# Patient Record
Sex: MALE | Race: BLACK OR AFRICAN AMERICAN | NOT HISPANIC OR LATINO | Employment: OTHER | URBAN - METROPOLITAN AREA
[De-identification: names, ages, dates, MRNs, and addresses within clinical notes are randomized per-mention and may not be internally consistent; named-entity substitution may affect disease eponyms.]

---

## 2017-06-09 ENCOUNTER — DOCUMENTATION ONLY (OUTPATIENT)
Dept: INTERNAL MEDICINE | Facility: CLINIC | Age: 72
End: 2017-06-09

## 2017-08-02 ENCOUNTER — HOSPITAL ENCOUNTER (EMERGENCY)
Facility: HOSPITAL | Age: 72
Discharge: HOME | End: 2017-08-02
Attending: FAMILY MEDICINE
Payer: MEDICARE

## 2017-08-02 VITALS
RESPIRATION RATE: 18 BRPM | OXYGEN SATURATION: 98 % | DIASTOLIC BLOOD PRESSURE: 79 MMHG | SYSTOLIC BLOOD PRESSURE: 137 MMHG | TEMPERATURE: 98 F | HEART RATE: 94 BPM

## 2017-08-02 DIAGNOSIS — M54.32 BILATERAL SCIATICA: Primary | ICD-10-CM

## 2017-08-02 DIAGNOSIS — M54.31 BILATERAL SCIATICA: Primary | ICD-10-CM

## 2017-08-02 PROCEDURE — 96372 THER/PROPH/DIAG INJ SC/IM: CPT

## 2017-08-02 PROCEDURE — 99283 EMERGENCY DEPT VISIT LOW MDM: CPT | Mod: 25

## 2017-08-02 PROCEDURE — 63600175 PHARM REV CODE 636 W HCPCS: Performed by: FAMILY MEDICINE

## 2017-08-02 PROCEDURE — 99283 EMERGENCY DEPT VISIT LOW MDM: CPT | Mod: ,,, | Performed by: FAMILY MEDICINE

## 2017-08-02 RX ORDER — TRAMADOL HYDROCHLORIDE 50 MG/1
50 TABLET ORAL EVERY 6 HOURS PRN
COMMUNITY
End: 2019-04-30

## 2017-08-02 RX ORDER — TRIAMCINOLONE ACETONIDE 40 MG/ML
80 INJECTION, SUSPENSION INTRA-ARTICULAR; INTRAMUSCULAR
Status: COMPLETED | OUTPATIENT
Start: 2017-08-02 | End: 2017-08-02

## 2017-08-02 RX ADMIN — TRIAMCINOLONE ACETONIDE 80 MG: 40 INJECTION, SUSPENSION INTRA-ARTICULAR; INTRAMUSCULAR at 06:08

## 2017-08-02 NOTE — ED TRIAGE NOTES
Pt c/o weakness to bilateral legs x 2 days. Pt also c/o pain to bilateral legs, lower back, and right shoulder - rates pain 9/10 at this time. Denies numbness or tingling. Denies CP or SOB. Denies HA or dizziness.

## 2017-08-02 NOTE — ED NOTES
"Pt instructed on 15 min shot time prior to leaving, will be assessed for reaction. Pt stated, "ok"  "

## 2017-08-02 NOTE — ED PROVIDER NOTES
Encounter Date: 8/2/2017    SCRIBE #1 NOTE: I, Radhagordo Chang, am scribing for, and in the presence of, Dr. Hook.       History     Chief Complaint   Patient presents with    Weakness     c/o weakness to bilateral legs x 2 days with lower back and leg pains. Ambulatory with hussein     Time seen by provider: 6:24 PM    This is a 71 y.o. male with a PMHx of MS, HNT, DJD, CAD, and DM who presents with complaint of 3 days of bilateral leg weakness and pain. The patient believes this is due to his MS but he has not seen the doctors who manage his MS in >6 months. The patient denies fever, nausea, vomiting, or diarrhea. The patient has no change in bowel or bladder habits.       The history is provided by the patient.     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 No pertinent family history.  Social History   Substance Use Topics    Smoking status: Former Smoker     Packs/day: 2.00     Years: 22.00     Types: Cigarettes     Quit date: 9/6/1989    Smokeless tobacco: Never Used      Comment: Quit smoking (2 pk/day) om 1987    Alcohol use No     Review of Systems   Constitutional: Negative for chills and fever.   HENT: Negative for facial swelling and nosebleeds.    Eyes: Negative for visual disturbance.   Respiratory: Negative for cough and shortness of breath.    Cardiovascular: Negative for chest pain and palpitations.   Gastrointestinal: Negative for abdominal distention, abdominal pain, diarrhea, nausea and vomiting.   Genitourinary:  Negative for difficulty urinating, dysuria, frequency and hematuria.   Musculoskeletal: Positive for myalgias. Negative for neck pain and neck stiffness.   Skin: Negative for rash.   Neurological: Positive for weakness (bilateral legs). Negative for seizures, syncope and speech difficulty.       Physical Exam     Initial Vitals [08/02/17 1721]   BP Pulse Resp Temp SpO2   137/79 94 18 98.4 °F (36.9 °C) 98 %      MAP       98.33         Physical Exam    Nursing note and vitals reviewed.  Constitutional: He appears well-developed and well-nourished. He is not diaphoretic. No distress.   HENT:   Head: Normocephalic and atraumatic.   Eyes: Conjunctivae and EOM are normal. Pupils are equal, round, and reactive to light. No scleral icterus.   Neck: Normal range of motion. Neck supple.   Cardiovascular: Normal rate, regular rhythm, normal heart sounds and intact distal pulses.   No murmur heard.  Pulmonary/Chest: Breath sounds normal. No respiratory distress. He has no wheezes. He has no rhonchi. He has no rales.   Abdominal: Soft. Bowel sounds are normal. He exhibits no distension. There is no tenderness.   Musculoskeletal: Normal range of motion. He exhibits no edema or tenderness.   No acute tenderness to lumbar spine. No deformities. Straight leg raise reproduces pain bilaterally.     Patient has multiple varicose veins without evidence of swelling or infection.    Neurological: He is alert and oriented to person, place, and time. He has normal strength. No cranial nerve deficit.   Bilateral lower extremity strength is equal   Skin: Skin is warm and dry. No rash noted. No erythema.   Psychiatric: He has a normal mood and affect.         ED Course   Procedures  Labs Reviewed - No data to display          Medical Decision Making:   History:   Old Medical Records: I decided to obtain old medical records.  ED Management:  Patient with bilateral leg weakness and pain.  Positive straight leg raising bilaterally.  I suspect  this is a sciatic nerve issue rather than an MS flare.  Triamcinolone injection.  Continue current pain medicine regimen.  Follow-up with his usual care providers for additional symptoms            Scribe Attestation:   Scribe #1: I performed the above scribed service and the documentation accurately describes the services I performed. I attest to the accuracy of the note.    Attending Attestation:           Physician Attestation for Scribe:  Physician Attestation Statement for Scribe #1: I, Dr. Hook, reviewed documentation, as scribed by Radha Chang in my presence, and it is both accurate and complete.                 ED Course     Clinical Impression:   The encounter diagnosis was Bilateral sciatica.                           Jose Hook MD  08/02/17 8724

## 2018-01-29 PROCEDURE — 93010 ELECTROCARDIOGRAM REPORT: CPT | Mod: ,,, | Performed by: INTERNAL MEDICINE

## 2018-01-29 PROCEDURE — 99285 EMERGENCY DEPT VISIT HI MDM: CPT | Mod: ,,, | Performed by: EMERGENCY MEDICINE

## 2018-01-29 PROCEDURE — 99285 EMERGENCY DEPT VISIT HI MDM: CPT | Mod: 25

## 2018-01-30 ENCOUNTER — HOSPITAL ENCOUNTER (OUTPATIENT)
Facility: HOSPITAL | Age: 73
Discharge: HOME | End: 2018-01-30
Attending: EMERGENCY MEDICINE | Admitting: EMERGENCY MEDICINE
Payer: MEDICARE

## 2018-01-30 VITALS
BODY MASS INDEX: 32.51 KG/M2 | HEART RATE: 82 BPM | TEMPERATURE: 98 F | OXYGEN SATURATION: 97 % | RESPIRATION RATE: 17 BRPM | HEIGHT: 72 IN | DIASTOLIC BLOOD PRESSURE: 104 MMHG | SYSTOLIC BLOOD PRESSURE: 146 MMHG | WEIGHT: 240 LBS

## 2018-01-30 DIAGNOSIS — G89.29 CHRONIC CHEST PAIN: ICD-10-CM

## 2018-01-30 DIAGNOSIS — R07.9 ACUTE CHEST PAIN: ICD-10-CM

## 2018-01-30 DIAGNOSIS — R07.9 CHRONIC CHEST PAIN: ICD-10-CM

## 2018-01-30 DIAGNOSIS — I25.10 CAD (CORONARY ARTERY DISEASE): ICD-10-CM

## 2018-01-30 DIAGNOSIS — R00.0 TACHYCARDIA: ICD-10-CM

## 2018-01-30 DIAGNOSIS — R00.2 PALPITATIONS: ICD-10-CM

## 2018-01-30 DIAGNOSIS — Z91.89 AT RISK FOR CORONARY ARTERY DISEASE: ICD-10-CM

## 2018-01-30 DIAGNOSIS — E11.9 TYPE 2 DIABETES MELLITUS WITHOUT COMPLICATION: Chronic | ICD-10-CM

## 2018-01-30 DIAGNOSIS — I25.118 CORONARY ARTERY DISEASE INVOLVING NATIVE CORONARY ARTERY OF NATIVE HEART WITH OTHER FORM OF ANGINA PECTORIS: Primary | ICD-10-CM

## 2018-01-30 DIAGNOSIS — I48.91 ATRIAL FIBRILLATION WITH RVR: ICD-10-CM

## 2018-01-30 DIAGNOSIS — I20.89 ANGINA AT REST: ICD-10-CM

## 2018-01-30 DIAGNOSIS — G35 MS (MULTIPLE SCLEROSIS): ICD-10-CM

## 2018-01-30 DIAGNOSIS — I20.9 ANGINA PECTORIS: ICD-10-CM

## 2018-01-30 LAB
ALBUMIN SERPL BCP-MCNC: 3.9 G/DL
ALP SERPL-CCNC: 110 U/L
ALT SERPL W/O P-5'-P-CCNC: 20 U/L
AMPHET+METHAMPHET UR QL: NEGATIVE
ANION GAP SERPL CALC-SCNC: 9 MMOL/L
AST SERPL-CCNC: 16 U/L
BARBITURATES UR QL SCN>200 NG/ML: NEGATIVE
BASOPHILS # BLD AUTO: 0.05 K/UL
BASOPHILS NFR BLD: 0.7 %
BENZODIAZ UR QL SCN>200 NG/ML: NEGATIVE
BILIRUB SERPL-MCNC: 0.4 MG/DL
BNP SERPL-MCNC: <10 PG/ML
BUN SERPL-MCNC: 21 MG/DL
BZE UR QL SCN: NEGATIVE
CALCIUM SERPL-MCNC: 9.8 MG/DL
CANNABINOIDS UR QL SCN: NEGATIVE
CHLORIDE SERPL-SCNC: 112 MMOL/L
CO2 SERPL-SCNC: 23 MMOL/L
CREAT SERPL-MCNC: 1.5 MG/DL
CREAT UR-MCNC: 268 MG/DL
D DIMER PPP IA.FEU-MCNC: 0.32 MG/L FEU
DIASTOLIC DYSFUNCTION: NO
DIFFERENTIAL METHOD: NORMAL
EOSINOPHIL # BLD AUTO: 0.1 K/UL
EOSINOPHIL NFR BLD: 1.7 %
ERYTHROCYTE [DISTWIDTH] IN BLOOD BY AUTOMATED COUNT: 12.7 %
EST. GFR  (AFRICAN AMERICAN): 53.8 ML/MIN/1.73 M^2
EST. GFR  (NON AFRICAN AMERICAN): 46.5 ML/MIN/1.73 M^2
ESTIMATED AVG GLUCOSE: 186 MG/DL
ESTIMATED PA SYSTOLIC PRESSURE: 34.42
ETHANOL UR-MCNC: <10 MG/DL
GLUCOSE SERPL-MCNC: 143 MG/DL
HBA1C MFR BLD HPLC: 8.1 %
HCT VFR BLD AUTO: 46 %
HGB BLD-MCNC: 16.1 G/DL
IMM GRANULOCYTES # BLD AUTO: 0.03 K/UL
IMM GRANULOCYTES NFR BLD AUTO: 0.4 %
INR PPP: 1.1
LYMPHOCYTES # BLD AUTO: 2.2 K/UL
LYMPHOCYTES NFR BLD: 30.4 %
MCH RBC QN AUTO: 30.1 PG
MCHC RBC AUTO-ENTMCNC: 35 G/DL
MCV RBC AUTO: 86 FL
METHADONE UR QL SCN>300 NG/ML: NEGATIVE
MITRAL VALVE MOBILITY: NORMAL
MONOCYTES # BLD AUTO: 0.6 K/UL
MONOCYTES NFR BLD: 7.7 %
NEUTROPHILS # BLD AUTO: 4.3 K/UL
NEUTROPHILS NFR BLD: 59.1 %
NRBC BLD-RTO: 0 /100 WBC
OPIATES UR QL SCN: NEGATIVE
PCP UR QL SCN>25 NG/ML: NEGATIVE
PLATELET # BLD AUTO: 166 K/UL
PMV BLD AUTO: 10 FL
POCT GLUCOSE: 126 MG/DL (ref 70–110)
POCT GLUCOSE: 162 MG/DL (ref 70–110)
POCT GLUCOSE: 164 MG/DL (ref 70–110)
POTASSIUM SERPL-SCNC: 4.2 MMOL/L
PROT SERPL-MCNC: 7.3 G/DL
PROTHROMBIN TIME: 11.4 SEC
RBC # BLD AUTO: 5.35 M/UL
RETIRED EF AND QEF - SEE NOTES: 65 (ref 55–65)
SODIUM SERPL-SCNC: 144 MMOL/L
T4 FREE SERPL-MCNC: 0.98 NG/DL
TOXICOLOGY INFORMATION: NORMAL
TRICUSPID VALVE REGURGITATION: NORMAL
TROPONIN I SERPL DL<=0.01 NG/ML-MCNC: 0.04 NG/ML
TROPONIN I SERPL DL<=0.01 NG/ML-MCNC: 0.05 NG/ML
TSH SERPL DL<=0.005 MIU/L-ACNC: 2.1 UIU/ML
WBC # BLD AUTO: 7.23 K/UL

## 2018-01-30 PROCEDURE — 82962 GLUCOSE BLOOD TEST: CPT | Mod: 91

## 2018-01-30 PROCEDURE — 80053 COMPREHEN METABOLIC PANEL: CPT

## 2018-01-30 PROCEDURE — 63600175 PHARM REV CODE 636 W HCPCS: Performed by: HOSPITALIST

## 2018-01-30 PROCEDURE — 85610 PROTHROMBIN TIME: CPT

## 2018-01-30 PROCEDURE — 84484 ASSAY OF TROPONIN QUANT: CPT | Mod: 91

## 2018-01-30 PROCEDURE — 94761 N-INVAS EAR/PLS OXIMETRY MLT: CPT

## 2018-01-30 PROCEDURE — 84484 ASSAY OF TROPONIN QUANT: CPT

## 2018-01-30 PROCEDURE — 25000003 PHARM REV CODE 250: Performed by: HOSPITALIST

## 2018-01-30 PROCEDURE — 96372 THER/PROPH/DIAG INJ SC/IM: CPT

## 2018-01-30 PROCEDURE — 99220 PR INITIAL OBSERVATION CARE,LEVL III: CPT | Mod: ,,, | Performed by: HOSPITALIST

## 2018-01-30 PROCEDURE — 99217 PR OBSERVATION CARE DISCHARGE: CPT | Mod: ,,, | Performed by: HOSPITALIST

## 2018-01-30 PROCEDURE — 25000003 PHARM REV CODE 250: Performed by: EMERGENCY MEDICINE

## 2018-01-30 PROCEDURE — 85025 COMPLETE CBC W/AUTO DIFF WBC: CPT

## 2018-01-30 PROCEDURE — 85379 FIBRIN DEGRADATION QUANT: CPT

## 2018-01-30 PROCEDURE — 25000242 PHARM REV CODE 250 ALT 637 W/ HCPCS: Performed by: HOSPITALIST

## 2018-01-30 PROCEDURE — 84439 ASSAY OF FREE THYROXINE: CPT

## 2018-01-30 PROCEDURE — 83036 HEMOGLOBIN GLYCOSYLATED A1C: CPT

## 2018-01-30 PROCEDURE — 93005 ELECTROCARDIOGRAM TRACING: CPT

## 2018-01-30 PROCEDURE — 80307 DRUG TEST PRSMV CHEM ANLYZR: CPT

## 2018-01-30 PROCEDURE — 93306 TTE W/DOPPLER COMPLETE: CPT

## 2018-01-30 PROCEDURE — 96374 THER/PROPH/DIAG INJ IV PUSH: CPT | Mod: 59

## 2018-01-30 PROCEDURE — 83880 ASSAY OF NATRIURETIC PEPTIDE: CPT

## 2018-01-30 PROCEDURE — 84443 ASSAY THYROID STIM HORMONE: CPT

## 2018-01-30 PROCEDURE — 93306 TTE W/DOPPLER COMPLETE: CPT | Mod: 26,,, | Performed by: INTERNAL MEDICINE

## 2018-01-30 PROCEDURE — 93010 ELECTROCARDIOGRAM REPORT: CPT | Mod: ,,, | Performed by: INTERNAL MEDICINE

## 2018-01-30 PROCEDURE — 96361 HYDRATE IV INFUSION ADD-ON: CPT

## 2018-01-30 PROCEDURE — G0378 HOSPITAL OBSERVATION PER HR: HCPCS

## 2018-01-30 RX ORDER — GLUCAGON 1 MG
1 KIT INJECTION
Status: DISCONTINUED | OUTPATIENT
Start: 2018-01-30 | End: 2018-01-30 | Stop reason: HOSPADM

## 2018-01-30 RX ORDER — METOPROLOL TARTRATE 25 MG/1
25 TABLET, FILM COATED ORAL 2 TIMES DAILY
Status: DISCONTINUED | OUTPATIENT
Start: 2018-01-30 | End: 2018-01-30

## 2018-01-30 RX ORDER — INSULIN ASPART 100 [IU]/ML
1-10 INJECTION, SOLUTION INTRAVENOUS; SUBCUTANEOUS
Status: DISCONTINUED | OUTPATIENT
Start: 2018-01-30 | End: 2018-01-30 | Stop reason: HOSPADM

## 2018-01-30 RX ORDER — SODIUM CHLORIDE 0.9 % (FLUSH) 0.9 %
5 SYRINGE (ML) INJECTION
Status: DISCONTINUED | OUTPATIENT
Start: 2018-01-30 | End: 2018-01-30 | Stop reason: HOSPADM

## 2018-01-30 RX ORDER — IBUPROFEN 200 MG
16 TABLET ORAL
Status: DISCONTINUED | OUTPATIENT
Start: 2018-01-30 | End: 2018-01-30 | Stop reason: HOSPADM

## 2018-01-30 RX ORDER — SIMVASTATIN 20 MG/1
80 TABLET, FILM COATED ORAL NIGHTLY
Status: DISCONTINUED | OUTPATIENT
Start: 2018-01-30 | End: 2018-01-30 | Stop reason: HOSPADM

## 2018-01-30 RX ORDER — VALSARTAN 160 MG/1
160 TABLET ORAL DAILY
Status: DISCONTINUED | OUTPATIENT
Start: 2018-01-30 | End: 2018-01-30 | Stop reason: HOSPADM

## 2018-01-30 RX ORDER — ACETAMINOPHEN 500 MG
5000 TABLET ORAL DAILY
Status: DISCONTINUED | OUTPATIENT
Start: 2018-01-30 | End: 2018-01-30 | Stop reason: HOSPADM

## 2018-01-30 RX ORDER — AMOXICILLIN 250 MG
2 CAPSULE ORAL 2 TIMES DAILY PRN
Status: DISCONTINUED | OUTPATIENT
Start: 2018-01-30 | End: 2018-01-30 | Stop reason: HOSPADM

## 2018-01-30 RX ORDER — SERTRALINE HYDROCHLORIDE 50 MG/1
100 TABLET, FILM COATED ORAL DAILY
Status: DISCONTINUED | OUTPATIENT
Start: 2018-01-30 | End: 2018-01-30 | Stop reason: HOSPADM

## 2018-01-30 RX ORDER — CLOPIDOGREL BISULFATE 75 MG/1
75 TABLET ORAL DAILY
Status: DISCONTINUED | OUTPATIENT
Start: 2018-01-30 | End: 2018-01-30 | Stop reason: HOSPADM

## 2018-01-30 RX ORDER — BUTALBITAL, ACETAMINOPHEN AND CAFFEINE 50; 325; 40 MG/1; MG/1; MG/1
1 TABLET ORAL EVERY 6 HOURS PRN
Status: DISCONTINUED | OUTPATIENT
Start: 2018-01-30 | End: 2018-01-30 | Stop reason: HOSPADM

## 2018-01-30 RX ORDER — IPRATROPIUM BROMIDE AND ALBUTEROL SULFATE 2.5; .5 MG/3ML; MG/3ML
3 SOLUTION RESPIRATORY (INHALATION) EVERY 4 HOURS PRN
Status: DISCONTINUED | OUTPATIENT
Start: 2018-01-30 | End: 2018-01-30 | Stop reason: HOSPADM

## 2018-01-30 RX ORDER — METOPROLOL SUCCINATE 50 MG/1
50 TABLET, EXTENDED RELEASE ORAL DAILY
Status: DISCONTINUED | OUTPATIENT
Start: 2018-01-30 | End: 2018-01-30 | Stop reason: HOSPADM

## 2018-01-30 RX ORDER — LABETALOL HYDROCHLORIDE 5 MG/ML
10 INJECTION, SOLUTION INTRAVENOUS ONCE
Status: COMPLETED | OUTPATIENT
Start: 2018-01-30 | End: 2018-01-30

## 2018-01-30 RX ORDER — TRAMADOL HYDROCHLORIDE 50 MG/1
50 TABLET ORAL EVERY 6 HOURS PRN
Status: DISCONTINUED | OUTPATIENT
Start: 2018-01-30 | End: 2018-01-30 | Stop reason: HOSPADM

## 2018-01-30 RX ORDER — ASPIRIN 325 MG
325 TABLET, DELAYED RELEASE (ENTERIC COATED) ORAL
Status: DISCONTINUED | OUTPATIENT
Start: 2018-01-30 | End: 2018-01-30

## 2018-01-30 RX ORDER — ASPIRIN 81 MG/1
81 TABLET ORAL DAILY
Status: DISCONTINUED | OUTPATIENT
Start: 2018-01-31 | End: 2018-01-30 | Stop reason: HOSPADM

## 2018-01-30 RX ORDER — HEPARIN SODIUM 5000 [USP'U]/ML
5000 INJECTION, SOLUTION INTRAVENOUS; SUBCUTANEOUS EVERY 8 HOURS
Status: DISCONTINUED | OUTPATIENT
Start: 2018-01-30 | End: 2018-01-30 | Stop reason: HOSPADM

## 2018-01-30 RX ORDER — ASPIRIN 325 MG
325 TABLET ORAL
Status: COMPLETED | OUTPATIENT
Start: 2018-01-30 | End: 2018-01-30

## 2018-01-30 RX ORDER — TRAZODONE HYDROCHLORIDE 50 MG/1
50 TABLET ORAL NIGHTLY PRN
Status: DISCONTINUED | OUTPATIENT
Start: 2018-01-30 | End: 2018-01-30 | Stop reason: HOSPADM

## 2018-01-30 RX ORDER — ACETAMINOPHEN 325 MG/1
650 TABLET ORAL EVERY 4 HOURS PRN
Status: DISCONTINUED | OUTPATIENT
Start: 2018-01-30 | End: 2018-01-30 | Stop reason: HOSPADM

## 2018-01-30 RX ORDER — METOPROLOL SUCCINATE 25 MG/1
50 TABLET, EXTENDED RELEASE ORAL DAILY
Qty: 30 TABLET | Refills: 5 | Status: SHIPPED | OUTPATIENT
Start: 2018-01-30 | End: 2019-10-03 | Stop reason: SDUPTHER

## 2018-01-30 RX ORDER — INSULIN ASPART 100 [IU]/ML
5 INJECTION, SOLUTION INTRAVENOUS; SUBCUTANEOUS
Status: DISCONTINUED | OUTPATIENT
Start: 2018-01-30 | End: 2018-01-30 | Stop reason: HOSPADM

## 2018-01-30 RX ORDER — FLUTICASONE PROPIONATE 50 MCG
1 SPRAY, SUSPENSION (ML) NASAL DAILY
Status: DISCONTINUED | OUTPATIENT
Start: 2018-01-30 | End: 2018-01-30 | Stop reason: HOSPADM

## 2018-01-30 RX ORDER — ONDANSETRON 2 MG/ML
4 INJECTION INTRAMUSCULAR; INTRAVENOUS EVERY 8 HOURS PRN
Status: DISCONTINUED | OUTPATIENT
Start: 2018-01-30 | End: 2018-01-30 | Stop reason: HOSPADM

## 2018-01-30 RX ORDER — ALPRAZOLAM 0.5 MG/1
1 TABLET ORAL 3 TIMES DAILY PRN
Status: DISCONTINUED | OUTPATIENT
Start: 2018-01-30 | End: 2018-01-30 | Stop reason: HOSPADM

## 2018-01-30 RX ORDER — FLUTICASONE FUROATE AND VILANTEROL 100; 25 UG/1; UG/1
1 POWDER RESPIRATORY (INHALATION) DAILY
Status: DISCONTINUED | OUTPATIENT
Start: 2018-01-30 | End: 2018-01-30 | Stop reason: HOSPADM

## 2018-01-30 RX ORDER — IBUPROFEN 200 MG
24 TABLET ORAL
Status: DISCONTINUED | OUTPATIENT
Start: 2018-01-30 | End: 2018-01-30 | Stop reason: HOSPADM

## 2018-01-30 RX ORDER — PANTOPRAZOLE SODIUM 40 MG/1
40 TABLET, DELAYED RELEASE ORAL DAILY
Status: DISCONTINUED | OUTPATIENT
Start: 2018-01-30 | End: 2018-01-30 | Stop reason: HOSPADM

## 2018-01-30 RX ADMIN — VALSARTAN 160 MG: 160 TABLET ORAL at 09:01

## 2018-01-30 RX ADMIN — CLOPIDOGREL 75 MG: 75 TABLET, FILM COATED ORAL at 09:01

## 2018-01-30 RX ADMIN — FLUTICASONE FUROATE AND VILANTEROL TRIFENATATE 1 PUFF: 100; 25 POWDER RESPIRATORY (INHALATION) at 12:01

## 2018-01-30 RX ADMIN — INSULIN ASPART 5 UNITS: 100 INJECTION, SOLUTION INTRAVENOUS; SUBCUTANEOUS at 09:01

## 2018-01-30 RX ADMIN — SODIUM CHLORIDE 1000 ML: 0.9 INJECTION, SOLUTION INTRAVENOUS at 03:01

## 2018-01-30 RX ADMIN — HEPARIN SODIUM 5000 UNITS: 5000 INJECTION INTRAVENOUS; SUBCUTANEOUS at 06:01

## 2018-01-30 RX ADMIN — SERTRALINE HYDROCHLORIDE 100 MG: 50 TABLET ORAL at 09:01

## 2018-01-30 RX ADMIN — LABETALOL HYDROCHLORIDE 10 MG: 5 INJECTION, SOLUTION INTRAVENOUS at 01:01

## 2018-01-30 RX ADMIN — INSULIN ASPART 5 UNITS: 100 INJECTION, SOLUTION INTRAVENOUS; SUBCUTANEOUS at 12:01

## 2018-01-30 RX ADMIN — PANTOPRAZOLE SODIUM 40 MG: 40 TABLET, DELAYED RELEASE ORAL at 09:01

## 2018-01-30 RX ADMIN — FLUTICASONE PROPIONATE 50 MCG: 50 SPRAY, METERED NASAL at 12:01

## 2018-01-30 RX ADMIN — Medication 325 MG: at 04:01

## 2018-01-30 NOTE — ED PROVIDER NOTES
Encounter Date: 1/29/2018    SCRIBE #1 NOTE: I, Stefan Rivas, am scribing for, and in the presence of,  Dr. Hightower. I have scribed the following portions of the note - the EKG reading.       History     Chief Complaint   Patient presents with    Palpitations     palpitations all day, denies CP     HPI   71 y/o male with PMHx significant for CAD, past stents in 2015 who presents to the ED with tachycardia and chest pain. States that this began around noon, was not doing anything when this began. States that he had mild, intermittent, non radiating chest pain with this, lasted a few minutes at a time. Not having active chest pain at this time. States that this happened to him several months ago, was admitted but unsure what was done for him when admitted. Has not taken any meds for this. No unilateral leg swelling, no recent travel, no hemoptysis. No dark stools, no BRBPR. No recent decrease in PO intake.    Review of patient's allergies indicates:   Allergen Reactions    Pcn [penicillins] Itching     Past Medical History:   Diagnosis Date    CAD (coronary artery disease) 2/4/2014    PTCA/stent 2010    CHRONIC BRONCHITIS     Diabetes mellitus, type 2 2001    DJD (degenerative joint disease) of lumbar spine 2/4/2014    HTN (hypertension), benign 1987    MS (multiple sclerosis) 1990     Past Surgical History:   Procedure Laterality Date    CARDIAC SURGERY      CORONARY STENT PLACEMENT      LUNG SURGERY       No family history on file.  Social History   Substance Use Topics    Smoking status: Former Smoker     Packs/day: 2.00     Years: 22.00     Types: Cigarettes     Quit date: 9/6/1989    Smokeless tobacco: Never Used      Comment: Quit smoking (2 pk/day) om 1987    Alcohol use No     Review of Systems   Constitutional: Negative for chills and fever.   HENT: Negative for drooling and tinnitus.    Eyes: Negative for photophobia and visual disturbance.   Respiratory: Negative for shortness of breath and  wheezing.    Cardiovascular: Positive for chest pain and palpitations.   Gastrointestinal: Negative for abdominal pain, diarrhea, nausea and vomiting.   Genitourinary: Negative for flank pain and hematuria.   Musculoskeletal: Negative for neck pain and neck stiffness.   Skin: Negative for color change and pallor.   Neurological: Negative for light-headedness and numbness.   Psychiatric/Behavioral: Negative for agitation and confusion.       Physical Exam     Initial Vitals [01/29/18 2352]   BP Pulse Resp Temp SpO2   (!) 153/114 (!) 145 20 97.5 °F (36.4 °C) 98 %      MAP       127         Vitals:    01/30/18 1215 01/30/18 1219 01/30/18 1314 01/30/18 1345   BP: (!) 188/92  (!) 180/102 123/78   Pulse: 92 94 92 92   Resp: (!) 21 18 20 19   Temp:       TempSrc:       SpO2: 98% 98% 98% 96%   Weight:       Height:        01/30/18 1402   BP: (!) 146/104   Pulse: 82   Resp: 17   Temp:    TempSrc:    SpO2: 97%   Weight:    Height:        Physical Exam    Constitutional: He appears well-developed and well-nourished.   Sitting comfortably in bed with daughter at bedside, no diaphoresis   HENT:   Head: Normocephalic and atraumatic.   Mouth/Throat: No oropharyngeal exudate.   Eyes: EOM are normal. Pupils are equal, round, and reactive to light. No scleral icterus.   Neck: Normal range of motion. Neck supple. No tracheal deviation present. No JVD present.   Cardiovascular: Regular rhythm. Exam reveals no gallop and no friction rub.    No murmur heard.  Tachycardic, no peripheral edema, pulses 2+ bilaterally   Pulmonary/Chest: Breath sounds normal. No respiratory distress. He has no wheezes. He has no rhonchi. He has no rales.   Abdominal: Soft. He exhibits no distension. There is no tenderness. There is no rebound and no guarding.   Musculoskeletal: Normal range of motion.   Neurological: He is alert and oriented to person, place, and time. No cranial nerve deficit.   Skin: Skin is warm and dry.   Psychiatric: He has a normal mood  and affect. Thought content normal.         ED Course   Procedures  Labs Reviewed   COMPREHENSIVE METABOLIC PANEL - Abnormal; Notable for the following:        Result Value    Chloride 112 (*)     Glucose 143 (*)     Creatinine 1.5 (*)     eGFR if  53.8 (*)     eGFR if non  46.5 (*)     All other components within normal limits   TROPONIN I - Abnormal; Notable for the following:     Troponin I 0.046 (*)     All other components within normal limits   HEMOGLOBIN A1C - Abnormal; Notable for the following:     Hemoglobin A1C 8.1 (*)     Estimated Avg Glucose 186 (*)     All other components within normal limits   TROPONIN I - Abnormal; Notable for the following:     Troponin I 0.040 (*)     All other components within normal limits   POCT GLUCOSE - Abnormal; Notable for the following:     POCT Glucose 164 (*)     All other components within normal limits   POCT GLUCOSE - Abnormal; Notable for the following:     POCT Glucose 126 (*)     All other components within normal limits   POCT GLUCOSE - Abnormal; Notable for the following:     POCT Glucose 162 (*)     All other components within normal limits   CBC W/ AUTO DIFFERENTIAL   B-TYPE NATRIURETIC PEPTIDE   D DIMER, QUANTITATIVE   PROTIME-INR   PROTIME-INR   D DIMER, QUANTITATIVE   TOXICOLOGY SCREEN, URINE, RANDOM (COMPLIANCE)   T4, FREE   TSH   TSH    Narrative:     add on test free t4 and tsh per dr trang marcus order #[id number]  [id number]   05:24  01/30/2018    T4, FREE    Narrative:     add on test free t4 and tsh per dr trang marcus order #[id number]  [id number]   05:24  01/30/2018    TROPONIN I     X-Ray Chest PA And Lateral   Final Result         No acute radiographic findings in the chest.         Electronically signed by: PEGGY MCCULLOUGH MD   Date:     01/30/18   Time:    03:31       NM Myocardial Perfusion Spect Multi Exer    (Results Pending)   NM Myocardial Perfusion Spect Multi Pharmacologic    (Results Pending)        Assessment: 73 y/o male with palpitations, tachycardia and chest pain    Ddx includes but is not limited to: arrhythmia, dehydration, anemia, ACS/MI, VTE, hyper/hypokalemia, infection/sepsis, dehydration.    Plan: Patient currently HD stable, afebrile. D-dimer wnl, BNP wnl. Patient noted to have a trop that was elevated, likely 2/2 demand ischemia from patient's tachycardia. Patient's CBC unremarkable, CMP does demonstrate a Cr of 1.5, which is slightly increased from patient's baseline. Getting IVF. Will continue to monitor.    Christopher Luciano, HO-1  1/30/2018 3:35 AM     1 Update:  Patient admitted for observation status to trend trops, likely to obtain echo. Patient stable at this time.    Christopher Luciano, RACHEL-1 1/30/2018 4:19 AM        EKG Readings: (Independently Interpreted)   EKG: sinus tachycardia at 112 bpm, no CRISTO's or STD's, non-specific twave pattern, no STEMI            Medical Decision Making:   History:   Old Medical Records: I decided to obtain old medical records.  Independently Interpreted Test(s):   I have ordered and independently interpreted EKG Reading(s) - see prior notes  Clinical Tests:   Lab Tests: Ordered and Reviewed  Radiological Study: Ordered and Reviewed  Medical Tests: Ordered and Reviewed            Scribe Attestation:   Scribe #1: I performed the above scribed service and the documentation accurately describes the services I performed. I attest to the accuracy of the note.    Attending Attestation:   Physician Attestation Statement for Resident:  As the supervising MD   Physician Attestation Statement: I have personally seen and examined this patient.   I agree with the above history. -:   As the supervising MD I agree with the above PE.    As the supervising MD I agree with the above treatment, course, plan, and disposition.  I have reviewed and agree with the residents interpretation of the following: lab data, x-rays and EKG.  I have reviewed the following: old records at this  facility.            I, Dr. Lai Hightower, personally performed the services described in this documentation. All medical record entries made by the scribe were at my direction and in my presence.  I have reviewed the chart and agree that the record reflects my personal performance and is accurate and complete. Lai Hightower MD.  4:57 AM 01/31/2018            ED Course      Clinical Impression:   The primary encounter diagnosis was Coronary artery disease involving native coronary artery of native heart with other form of angina pectoris. Diagnoses of Palpitations, Tachycardia, Angina at rest, Acute chest pain, Chronic chest pain, Atrial fibrillation with RVR, At risk for coronary artery disease, and Angina pectoris were also pertinent to this visit.                           Christopher Luciano MD  Resident  01/30/18 0422       Lai Hightower MD  01/31/18 0457

## 2018-01-30 NOTE — H&P
[facility] Hospital Medicine  History & Physical    Patient Name: Vicente Luciano  MRN: [id number]  Admission Date: 1/30/2018  Attending Physician: Lai Hightower MD   Primary Care Provider: MINGO Garvin MD    [facility] Medicine Team: [facility] HOSP MED D Kinga Valentine DO     Patient information was obtained from patient and ER records.     Subjective:     Principal Problem:<principal problem not specified>    Chief Complaint:   Chief Complaint   Patient presents with    Palpitations     palpitations all day, denies CP        HPI:     72 year old male with h/o CAD s/p stents in 2015 at [facility], uncontrolled NIDDM2 ( A1C ~ 12), HTN, former smoker ( quit 1995 ), COPD, MS in remission presents to ED with complaint of chest pain. States yesterday around noon he developed palpitation at home followed by left sided chest pain at rest. Describes pain at tightness/squeezing in nature, intermittent and localized to left chest. Pain not associated with exertion. Denies diaphoresis, sob, nausea, vomiting, lightheadedness, dizziness. Notes chest pain continued throughout the day and thus his daughter finally drove him to ED around 7 pm. States pain resolved after receiving an aspirin in ED and remained chest pain free.     Initial EKG in ED showed sinus tachycardia ~140 which improved to HR 80-90 after 1 liter NS bolus. Denies fever, chills, N/V/D, sick contact, recent travel. Denies tobacco, alcohol, cocaine or other illicit drug abuse.     Initial troponin mildly elevated to 0.046    Past Medical History:   Diagnosis Date    CAD (coronary artery disease) 2/4/2014    PTCA/stent 2010    CHRONIC BRONCHITIS     Diabetes mellitus, type 2 2001    DJD (degenerative joint disease) of lumbar spine 2/4/2014    HTN (hypertension), benign 1987    MS (multiple sclerosis) 1990       Past Surgical History:   Procedure Laterality Date    CARDIAC SURGERY      CORONARY STENT PLACEMENT      LUNG SURGERY         Review  of patient's allergies indicates:   Allergen Reactions    Pcn [penicillins] Itching       No current facility-administered medications on file prior to encounter.      Current Outpatient Prescriptions on File Prior to Encounter   Medication Sig    albuterol (ACCUNEB) 0.63 mg/3 mL Nebu Take 0.63 mg by nebulization every 6 (six) hours as needed.    alprazolam (XANAX) 1 MG tablet Take 1 mg by mouth 3 (three) times daily as needed.    budesonide-formoterol 160-4.5 mcg (SYMBICORT) 160-4.5 mcg/actuation HFAA Inhale 2 puffs into the lungs every 12 (twelve) hours.    butalbital-acetaminophen-caffeine -40 mg (FIORICET, ESGIC) -40 mg per tablet Take 1 tablet by mouth every 6 (six) hours as needed for Pain or Headaches.    cholecalciferol, vitamin D3, 5,000 unit Tab Take 5,000 Units by mouth once daily.    clopidogrel (PLAVIX) 75 mg tablet Take 75 mg by mouth once daily.    flunisolide 25 mcg, 0.025%, (NASALIDE) 25 mcg (0.025 %) Spry 2 sprays by Nasal route 2 (two) times daily.    glipiZIDE (GLUCOTROL) 5 MG tablet Take 5 mg by mouth 2 (two) times daily before meals.    interferon beta-1a (AVONEX) 30 mcg/0.5 mL injection Inject 0.5 mLs (30 mcg total) into the muscle every 7 days.    linagliptin (TRADJENTA) 5 mg Tab tablet Take 1 tablet (5 mg total) by mouth once daily.    loratadine (CLARITIN) 10 mg tablet Take 10 mg by mouth daily as needed.    metformin (GLUCOPHAGE) 1000 MG tablet Take 1,000 mg by mouth 2 (two) times daily with meals.    omeprazole (PRILOSEC) 20 MG capsule Take 20 mg by mouth daily as needed.    sertraline (ZOLOFT) 100 MG tablet Take 100 mg by mouth once daily.    simvastatin (ZOCOR) 80 MG tablet Take 80 mg by mouth every evening.    tramadol (ULTRAM) 50 mg tablet Take 50 mg by mouth every 6 (six) hours as needed for Pain.    trazodone (DESYREL) 50 MG tablet Take 1 tablet (50 mg total) by mouth nightly as needed for Insomnia.    valsartan (DIOVAN) 160 MG tablet Take 160 mg by  mouth once daily.     Family History     None        Social History Main Topics    Smoking status: Former Smoker     Packs/day: 2.00     Years: 22.00     Types: Cigarettes     Quit date: 9/6/1989    Smokeless tobacco: Never Used      Comment: Quit smoking (2 pk/day) om 1987    Alcohol use No    Drug use: No    Sexual activity: Not on file     Review of Systems   Constitutional: Negative for activity change, appetite change, chills, diaphoresis, fatigue, fever and unexpected weight change.   HENT: Negative for congestion, dental problem, drooling, ear discharge, ear pain, facial swelling, hearing loss, mouth sores, nosebleeds, postnasal drip, rhinorrhea, sinus pressure, sneezing, sore throat, tinnitus, trouble swallowing and voice change.    Eyes: Negative for photophobia, pain, discharge, redness, itching and visual disturbance.   Respiratory: Positive for chest tightness. Negative for apnea, cough, choking, shortness of breath, wheezing and stridor.    Cardiovascular: Positive for chest pain and palpitations. Negative for leg swelling.   Gastrointestinal: Negative for abdominal distention, abdominal pain, anal bleeding, blood in stool, constipation, diarrhea, nausea, rectal pain and vomiting.   Endocrine: Negative for cold intolerance, heat intolerance, polydipsia, polyphagia and polyuria.   Genitourinary: Negative for decreased urine volume, difficulty urinating, discharge, dysuria, enuresis, flank pain, frequency, genital sores, hematuria, penile pain, penile swelling, scrotal swelling, testicular pain and urgency.   Musculoskeletal: Negative for arthralgias, back pain, gait problem, joint swelling, myalgias, neck pain and neck stiffness.   Skin: Negative for color change, pallor, rash and wound.   Allergic/Immunologic: Negative for environmental allergies, food allergies and immunocompromised state.   Neurological: Negative for dizziness, tremors, seizures, syncope, facial asymmetry, speech difficulty,  weakness, light-headedness, numbness and headaches.   Hematological: Negative for adenopathy. Does not bruise/bleed easily.   Psychiatric/Behavioral: Negative for agitation, behavioral problems, confusion, decreased concentration, dysphoric mood, hallucinations, self-injury, sleep disturbance and suicidal ideas. The patient is not nervous/anxious and is not hyperactive.      Objective:     Vital Signs (Most Recent):  Temp: 97.5 °F (36.4 °C) (01/29/18 2352)  Pulse: (!) 112 (01/30/18 0220)  Resp: 20 (01/30/18 0220)  BP: 131/80 (01/30/18 0231)  SpO2: 99 % (01/30/18 0220) Vital Signs (24h Range):  Temp:  [97.5 °F (36.4 °C)] 97.5 °F (36.4 °C)  Pulse:  [112-145] 112  Resp:  [20] 20  SpO2:  [98 %-99 %] 99 %  BP: (131-153)/() 131/80     Weight: 108.9 kg (240 lb)  Body mass index is 32.55 kg/m².    Physical Exam   Constitutional: He is oriented to person, place, and time. He appears well-developed and well-nourished. No distress.   HENT:   Head: Normocephalic and atraumatic.   Nose: Nose normal.   Mouth/Throat: Oropharynx is clear and moist. No oropharyngeal exudate.   Eyes: Conjunctivae and EOM are normal. Pupils are equal, round, and reactive to light. No scleral icterus.   Neck: Normal range of motion. Neck supple. No JVD present. No tracheal deviation present. No thyromegaly present.   Cardiovascular: Normal rate, regular rhythm, normal heart sounds and intact distal pulses.    No murmur heard.  Pulmonary/Chest: Effort normal and breath sounds normal. No respiratory distress. He has no wheezes. He has no rales. He exhibits no tenderness.   Abdominal: Soft. Bowel sounds are normal. He exhibits no distension and no mass. There is no tenderness. There is no rebound and no guarding.   Musculoskeletal: Normal range of motion. He exhibits no edema or tenderness.   Lymphadenopathy:     He has no cervical adenopathy.   Neurological: He is alert and oriented to person, place, and time. He has normal reflexes. He displays  normal reflexes. No cranial nerve deficit. He exhibits normal muscle tone. Coordination normal.   Skin: Skin is warm and dry. No rash noted. He is not diaphoretic. No erythema.   Psychiatric: He has a normal mood and affect. Judgment and thought content normal.         CRANIAL NERVES     CN III, IV, VI   Pupils are equal, round, and reactive to light.  Extraocular motions are normal.       Significant Labs:   Recent Results (from the past 24 hour(s))   CBC auto differential    Collection Time: 01/30/18  2:13 AM   Result Value Ref Range    WBC 7.23 3.90 - 12.70 K/uL    RBC 5.35 4.60 - 6.20 M/uL    Hemoglobin 16.1 14.0 - 18.0 g/dL    Hematocrit 46.0 40.0 - 54.0 %    MCV 86 82 - 98 fL    MCH 30.1 27.0 - 31.0 pg    MCHC 35.0 32.0 - 36.0 g/dL    RDW 12.7 11.5 - 14.5 %    Platelets 166 150 - 350 K/uL    MPV 10.0 9.2 - 12.9 fL    Immature Granulocytes 0.4 0.0 - 0.5 %    Gran # (ANC) 4.3 1.8 - 7.7 K/uL    Immature Grans (Abs) 0.03 0.00 - 0.04 K/uL    Lymph # 2.2 1.0 - 4.8 K/uL    Mono # 0.6 0.3 - 1.0 K/uL    Eos # 0.1 0.0 - 0.5 K/uL    Baso # 0.05 0.00 - 0.20 K/uL    nRBC 0 0 /100 WBC    Gran% 59.1 38.0 - 73.0 %    Lymph% 30.4 18.0 - 48.0 %    Mono% 7.7 4.0 - 15.0 %    Eosinophil% 1.7 0.0 - 8.0 %    Basophil% 0.7 0.0 - 1.9 %    Differential Method Automated    Protime-INR    Collection Time: 01/30/18  2:13 AM   Result Value Ref Range    Prothrombin Time 11.4 9.0 - 12.5 sec    INR 1.1 0.8 - 1.2   D dimer, quantitative    Collection Time: 01/30/18  2:13 AM   Result Value Ref Range    D-Dimer 0.32 <0.50 mg/L FEU   Comprehensive metabolic panel    Collection Time: 01/30/18  2:14 AM   Result Value Ref Range    Sodium 144 136 - 145 mmol/L    Potassium 4.2 3.5 - 5.1 mmol/L    Chloride 112 (H) 95 - 110 mmol/L    CO2 23 23 - 29 mmol/L    Glucose 143 (H) 70 - 110 mg/dL    BUN, Bld 21 8 - 23 mg/dL    Creatinine 1.5 (H) 0.5 - 1.4 mg/dL    Calcium 9.8 8.7 - 10.5 mg/dL    Total Protein 7.3 6.0 - 8.4 g/dL    Albumin 3.9 3.5 - 5.2 g/dL     Total Bilirubin 0.4 0.1 - 1.0 mg/dL    Alkaline Phosphatase 110 55 - 135 U/L    AST 16 10 - 40 U/L    ALT 20 10 - 44 U/L    Anion Gap 9 8 - 16 mmol/L    eGFR if African American 53.8 (A) >60 mL/min/1.73 m^2    eGFR if non  46.5 (A) >60 mL/min/1.73 m^2   Troponin I #1    Collection Time: 01/30/18  2:14 AM   Result Value Ref Range    Troponin I 0.046 (H) 0.000 - 0.026 ng/mL   B-Type natriuretic peptide (BNP)    Collection Time: 01/30/18  2:14 AM   Result Value Ref Range    BNP <10 0 - 99 pg/mL         Significant Imaging: I have reviewed and interpreted all pertinent imaging results/findings within the past 24 hours.    EKG : sinus tachycardia @ 140 bpm w/o acute ischemic change     CXR : no acute process     Assessment/Plan:     Active Diagnoses:    Diagnosis Date Noted POA    Palpitations [R00.2] 01/30/2018 Yes    Angina pectoris [I20.9] 01/30/2018 Yes    CAD (coronary artery disease) [I25.10] 02/04/2014 Yes    HTN (hypertension), benign [I10] 09/06/2012 Yes    COPD (chronic obstructive pulmonary disease) with chronic bronchitis [J44.9]  Yes      Problems Resolved During this Admission:    Diagnosis Date Noted Date Resolved POA     # Angina / palpitation   - reports palpitation and on/off left sided chest pain since noon   - sinus tachycardia upon presentation which resolved with IVF   - less likely to be PE given negative D-dimer and no hypoxia   - remained chest pain free in ED and currently asymptomatic   - EKG w/o acute ischemic pattern   - mild elevated troponin 0.046 likely 2/2 tachycardia induced demand ischemia   - palpitation, tachycardia could be from dehydration since it resolved with IVF   - check TSH, urine toxicology screen for tachycardia   - telemetry, trend troponin   - given h/o CAD and multiple risk factors will order TTE w doppler and cardiac PET stress   - unsure why patient not on beta blocker given h/o CAD s/p PCI   - will not start BB until UDS negative for cocaine  -  continue home meds aspirin, plavix, statin    - consider cardiology consult if troponin trends upward or stress test abnormal     # Uncontrolled NIDDM2  - last A1C 12 few years ago   - hold oral agent while in hospital   - will start on levemir 20, novolog 5 TIDWM and mod SSI   - check A1C    # HTN, HLD  - BP controlled on valsartan   - continue statin     # COPD   -former smoker quit 20 years ago   - no signs of exacerbation   - duonebs prn     # h/o Multiple sclerosis ( MS)  - in remission and asymptomatic     # depression/anxiety   - no acute issue   - denies SI/HI   - continue home dose zoloft and xanax prn       VTE Risk Mitigation         Ordered     heparin (porcine) injection 5,000 Units  Every 8 hours     Route:  Subcutaneous        01/30/18 0451     Medium Risk of VTE  Once      01/30/18 0451            Kinga Valentine DO  Department of Hospital Medicine   [facility]

## 2018-01-30 NOTE — ED NOTES
Adult Physical Assessment  LOC: Vicente Luciano, 72 y.o. male verified via two identifiers.  The patient is awake, alert, oriented and speaking appropriately at this time.  APPEARANCE: Patient resting comfortably and appears to be in no acute distress at this time. Patient is clean and well groomed, patient's clothing is properly fastened.  SKIN:The skin is warm and dry, color consistent with ethnicity, patient has normal skin turgor and moist mucus membranes, skin intact, no breakdown or brusing noted.  MUSCULOSKELETAL: Patient moving all extremities well, no obvious swelling or deformities noted.  RESPIRATORY: Airway is open and patent, respirations are spontaneous, patient has a normal effort and rate, no accessory muscle use noted.  CARDIAC: Patient has a normal rate and rhythm, no periphreal edema noted in any extremity, capillary refill < 3 seconds in all extremities. Pt denies cp, c/o chest tightness  ABDOMEN: Soft and non tender to palpation, no abdominal distention noted. Bowel sounds present in all four quadrants.  NEUROLOGIC: Eyes open spontaneously, behavior appropriate to situation, follows commands, facial expression symmetrical, bilateral hand grasp equal and even, purposeful motor response noted, normal sensation in all extremities when touched with a finger.

## 2018-02-01 NOTE — DISCHARGE SUMMARY
Discharge Summary  Hospital Medicine    Attending Provider on Discharge: Rd Gamez MD  Hospital Medicine Team: [facility] HOSP MED Z  Date of Admission:  1/30/2018     Date of Discharge:  1/30/2018    Active Hospital Problems    Diagnosis  POA    *Atrial fibrillation with RVR [I48.91]  Yes     Priority: 1 - High    CAD (coronary artery disease) [I25.10]  Yes     PTCA/stent 2010      HTN (hypertension), benign [I10]  Yes     1987      COPD (chronic obstructive pulmonary disease) with chronic bronchitis [J44.9]  Yes      Resolved Hospital Problems    Diagnosis Date Resolved POA    Palpitations [R00.2] 01/30/2018 Yes    Angina pectoris [I20.9] 01/30/2018 Yes       History of the Present Illness:      72 year old male with h/o CAD s/p stents in 2015 at [facility], uncontrolled NIDDM2 ( A1C ~ 12), HTN, former smoker ( quit 1995 ), COPD, MS in remission presents to ED with complaint of chest pain. States yesterday around noon he developed palpitation at home followed by left sided chest pain at rest. Describes pain at tightness/squeezing in nature, intermittent and localized to left chest. Pain not associated with exertion. Denies diaphoresis, sob, nausea, vomiting, lightheadedness, dizziness. Notes chest pain continued throughout the day and thus his daughter finally drove him to ED around 7 pm. States pain resolved after receiving an aspirin in ED and remained chest pain free.      Initial EKG in ED showed sinus tachycardia ~140 which improved to HR 80-90 after 1 liter NS bolus. Denies fever, chills, N/V/D, sick contact, recent travel. Denies tobacco, alcohol, cocaine or other illicit drug abuse.      Initial troponin mildly elevated to 0.046    Hospital Course of Principle Problem Addressed:       # Angina / palpitation   - resolved prior to admission    - EKG w/o acute ischemic pattern   - mild elevated troponin 0.046 likely 2/2 tachycardia induced demand ischemia   - less likely to be PE given negative D-dimer and no  hypoxia   - Troponin 0.046 > 0.040  - No further testing indicated while inpatient  - Follow up with cardiologist in 1-2 weeks    Other Medical Problems Addressed in the Hospital:     # Uncontrolled NIDDM2  - last A1C 12 few years ago   - hold oral agent while in hospital   - will start on levemir 20, novolog 5 TIDWM and mod SSI   - check A1C > 8.1     # HTN, HLD  - BP controlled on valsartan   - continue statin      # COPD   -former smoker quit 20 years ago   - no signs of exacerbation   - duonebs prn      # h/o Multiple sclerosis ( MS)  - in remission and asymptomatic      # depression/anxiety   - no acute issue   - denies SI/HI   - continue home dose zoloft and xanax prn        Significant diagnostic tests       Recent Labs  Lab 01/30/18  0213   WBC 7.23   HGB 16.1   HCT 46.0          Recent Labs  Lab 01/30/18 0214      K 4.2   *   CO2 23   BUN 21   CREATININE 1.5*   CALCIUM 9.8   PROT 7.3   BILITOT 0.4   ALKPHOS 110   ALT 20   AST 16        Discharge Medication List as of 1/30/2018  2:04 PM      START taking these medications    Details   metoprolol succinate (TOPROL-XL) 25 MG 24 hr tablet Take 2 tablets (50 mg total) by mouth once daily., Starting Tue 1/30/2018, Until Thu 3/1/2018, Normal         CONTINUE these medications which have NOT CHANGED    Details   albuterol (ACCUNEB) 0.63 mg/3 mL Nebu Take 0.63 mg by nebulization every 6 (six) hours as needed., Until Discontinued, Historical Med      alprazolam (XANAX) 1 MG tablet Take 1 mg by mouth 3 (three) times daily as needed., Until Discontinued, Historical Med      budesonide-formoterol 160-4.5 mcg (SYMBICORT) 160-4.5 mcg/actuation HFAA Inhale 2 puffs into the lungs every 12 (twelve) hours., Until Discontinued, Historical Med      butalbital-acetaminophen-caffeine -40 mg (FIORICET, ESGIC) -40 mg per tablet Take 1 tablet by mouth every 6 (six) hours as needed for Pain or Headaches., Starting 3/26/2016, Until Discontinued, Print       cholecalciferol, vitamin D3, 5,000 unit Tab Take 5,000 Units by mouth once daily., Until Discontinued, Historical Med      clopidogrel (PLAVIX) 75 mg tablet Take 75 mg by mouth once daily., Until Discontinued, Historical Med      flunisolide 25 mcg, 0.025%, (NASALIDE) 25 mcg (0.025 %) Spry 2 sprays by Nasal route 2 (two) times daily., Until Discontinued, Historical Med      glipiZIDE (GLUCOTROL) 5 MG tablet Take 5 mg by mouth 2 (two) times daily before meals., Until Discontinued, Historical Med      interferon beta-1a (AVONEX) 30 mcg/0.5 mL injection Inject 0.5 mLs (30 mcg total) into the muscle every 7 days., Starting 5/31/2016, Until Wed 5/31/17, No Print      linagliptin (TRADJENTA) 5 mg Tab tablet Take 1 tablet (5 mg total) by mouth once daily., Starting 2/21/2014, Until Discontinued, Normal      loratadine (CLARITIN) 10 mg tablet Take 10 mg by mouth daily as needed., Until Discontinued, Historical Med      metformin (GLUCOPHAGE) 1000 MG tablet Take 1,000 mg by mouth 2 (two) times daily with meals., Until Discontinued, Historical Med      omeprazole (PRILOSEC) 20 MG capsule Take 20 mg by mouth daily as needed., Until Discontinued, Historical Med      sertraline (ZOLOFT) 100 MG tablet Take 100 mg by mouth once daily., Until Discontinued, Historical Med      simvastatin (ZOCOR) 80 MG tablet Take 80 mg by mouth every evening., Until Discontinued, Historical Med      tramadol (ULTRAM) 50 mg tablet Take 50 mg by mouth every 6 (six) hours as needed for Pain., Historical Med      trazodone (DESYREL) 50 MG tablet Take 1 tablet (50 mg total) by mouth nightly as needed for Insomnia., Starting Tue 6/14/2016, Until Wed 8/2/2017, Print      valsartan (DIOVAN) 160 MG tablet Take 160 mg by mouth once daily., Until Discontinued, Historical Med           Discharge Diet:diabetic diet: 1800 calorie with Normal Fluid intake of 1500 - 2000 mL per day    Activity: activity as tolerated    Discharge Condition:  Good    Disposition: Home or Self Care    Tests pending at the time of discharge: none      Time spent  on the discharge of the patient including review of hospital course with the patient. reviewing discharge medications and arranging follow-up care 40 min    Discharge examination Patient was seen and examined on the date of discharge and determined to be suitable for discharge.    Future Appointments  Date Time Provider Department Center   2/5/2018 8:30 AM [facility] NM2 INFINIA 400LB LIMIT [facility]   2/5/2018 9:30 AM TREADMILL, NUCLEAR [facility] ECHOLAB [facility]   2/5/2018 11:30 AM [facility] NM2 INFINIA 400LB LIMIT [facility]   2/14/2018 9:00 AM Oscar Conrad MD [facility] CARDIO [facility]       Rd Gamez MD

## 2018-02-15 ENCOUNTER — DOCUMENTATION ONLY (OUTPATIENT)
Dept: INTERNAL MEDICINE | Facility: CLINIC | Age: 73
End: 2018-02-15

## 2018-02-15 NOTE — PROGRESS NOTES
Have tried numerous times to contact patient and set up a hospital follow up appointment. No answer and no voicemail in order to leave a message.

## 2018-04-18 ENCOUNTER — APPOINTMENT (RX ONLY)
Dept: URBAN - NONMETROPOLITAN AREA CLINIC 4 | Facility: CLINIC | Age: 73
Setting detail: DERMATOLOGY
End: 2018-04-18

## 2018-04-18 VITALS — WEIGHT: 160 LBS | HEIGHT: 66 IN

## 2018-04-18 DIAGNOSIS — L57.0 ACTINIC KERATOSIS: ICD-10-CM

## 2018-04-18 DIAGNOSIS — L57.8 OTHER SKIN CHANGES DUE TO CHRONIC EXPOSURE TO NONIONIZING RADIATION: ICD-10-CM

## 2018-04-18 DIAGNOSIS — D22 MELANOCYTIC NEVI: ICD-10-CM

## 2018-04-18 DIAGNOSIS — L82.1 OTHER SEBORRHEIC KERATOSIS: ICD-10-CM

## 2018-04-18 DIAGNOSIS — D18.0 HEMANGIOMA: ICD-10-CM

## 2018-04-18 PROBLEM — J30.1 ALLERGIC RHINITIS DUE TO POLLEN: Status: ACTIVE | Noted: 2018-04-18

## 2018-04-18 PROBLEM — L29.8 OTHER PRURITUS: Status: ACTIVE | Noted: 2018-04-18

## 2018-04-18 PROBLEM — D22.61 MELANOCYTIC NEVI OF RIGHT UPPER LIMB, INCLUDING SHOULDER: Status: ACTIVE | Noted: 2018-04-18

## 2018-04-18 PROBLEM — D18.01 HEMANGIOMA OF SKIN AND SUBCUTANEOUS TISSUE: Status: ACTIVE | Noted: 2018-04-18

## 2018-04-18 PROBLEM — D22.62 MELANOCYTIC NEVI OF LEFT UPPER LIMB, INCLUDING SHOULDER: Status: ACTIVE | Noted: 2018-04-18

## 2018-04-18 PROBLEM — L85.3 XEROSIS CUTIS: Status: ACTIVE | Noted: 2018-04-18

## 2018-04-18 PROBLEM — D22.5 MELANOCYTIC NEVI OF TRUNK: Status: ACTIVE | Noted: 2018-04-18

## 2018-04-18 PROCEDURE — ? COUNSELING

## 2018-04-18 PROCEDURE — 99213 OFFICE O/P EST LOW 20 MIN: CPT | Mod: 25

## 2018-04-18 PROCEDURE — ? LIQUID NITROGEN

## 2018-04-18 PROCEDURE — 17004 DESTROY PREMAL LESIONS 15/>: CPT

## 2018-04-18 ASSESSMENT — LOCATION SIMPLE DESCRIPTION DERM
LOCATION SIMPLE: LEFT HAND
LOCATION SIMPLE: UPPER BACK
LOCATION SIMPLE: RIGHT UPPER BACK
LOCATION SIMPLE: LEFT CHEEK
LOCATION SIMPLE: RIGHT ZYGOMA
LOCATION SIMPLE: RIGHT EAR
LOCATION SIMPLE: LEFT UPPER ARM
LOCATION SIMPLE: LEFT FOREARM
LOCATION SIMPLE: ABDOMEN
LOCATION SIMPLE: CHIN
LOCATION SIMPLE: LEFT FOREHEAD
LOCATION SIMPLE: RIGHT FOREARM
LOCATION SIMPLE: RIGHT ELBOW
LOCATION SIMPLE: RIGHT HAND
LOCATION SIMPLE: RIGHT UPPER ARM
LOCATION SIMPLE: LEFT TEMPLE

## 2018-04-18 ASSESSMENT — LOCATION ZONE DERM
LOCATION ZONE: ARM
LOCATION ZONE: EAR
LOCATION ZONE: FACE
LOCATION ZONE: TRUNK
LOCATION ZONE: HAND

## 2018-04-18 ASSESSMENT — LOCATION DETAILED DESCRIPTION DERM
LOCATION DETAILED: LEFT INFERIOR MEDIAL FOREHEAD
LOCATION DETAILED: RIGHT SUPERIOR UPPER BACK
LOCATION DETAILED: LEFT RADIAL DORSAL HAND
LOCATION DETAILED: RIGHT DISTAL POSTERIOR UPPER ARM
LOCATION DETAILED: RIGHT PROXIMAL RADIAL DORSAL FOREARM
LOCATION DETAILED: LEFT ANTERIOR PROXIMAL UPPER ARM
LOCATION DETAILED: RIGHT LATERAL ELBOW
LOCATION DETAILED: RIGHT SUPERIOR HELIX
LOCATION DETAILED: INFERIOR THORACIC SPINE
LOCATION DETAILED: RIGHT PROXIMAL POSTERIOR UPPER ARM
LOCATION DETAILED: LEFT DISTAL POSTERIOR UPPER ARM
LOCATION DETAILED: 3RD WEB SPACE RIGHT HAND
LOCATION DETAILED: LEFT MEDIAL TEMPLE
LOCATION DETAILED: RIGHT CHIN
LOCATION DETAILED: RIGHT VENTRAL PROXIMAL FOREARM
LOCATION DETAILED: RIGHT ANTERIOR DISTAL UPPER ARM
LOCATION DETAILED: LEFT LATERAL MALAR CHEEK
LOCATION DETAILED: EPIGASTRIC SKIN
LOCATION DETAILED: LEFT VENTRAL PROXIMAL FOREARM
LOCATION DETAILED: LEFT PROXIMAL POSTERIOR UPPER ARM
LOCATION DETAILED: SUPERIOR THORACIC SPINE
LOCATION DETAILED: RIGHT DISTAL DORSAL FOREARM
LOCATION DETAILED: RIGHT RADIAL DORSAL HAND
LOCATION DETAILED: RIGHT CENTRAL ZYGOMA
LOCATION DETAILED: LEFT ULNAR DORSAL HAND
LOCATION DETAILED: LEFT PROXIMAL DORSAL FOREARM

## 2018-04-18 NOTE — PROCEDURE: LIQUID NITROGEN
Post-Care Instructions: I reviewed with the patient in detail post-care instructions. Patient is to wear sunprotection, and avoid picking at any of the treated lesions. Pt may apply Vaseline to crusted or scabbing areas.  They were told if any of the lesions fail to resolve to f/u and have them evaluated.
Duration Of Freeze Thaw-Cycle (Seconds): 1
Number Of Freeze-Thaw Cycles: 1 freeze-thaw cycle
Render Post-Care Instructions In Note?: no
Aperture Size (Optional): C
Detail Level: Detailed
Consent: The patient's consent was obtained including but not limited to risks of crusting, scabbing, blistering, scarring, darker or lighter pigmentary change, recurrence, incomplete removal and infection.

## 2018-04-23 ENCOUNTER — DOCUMENTATION ONLY (OUTPATIENT)
Dept: INTERNAL MEDICINE | Facility: CLINIC | Age: 73
End: 2018-04-23

## 2018-04-23 NOTE — PROGRESS NOTES
Attempted to contact patient several times to set up follow-up appointment. No answer and no voicemail set up. The [facility] person we used to have in the office also tried with no success.

## 2018-10-17 ENCOUNTER — APPOINTMENT (RX ONLY)
Dept: URBAN - NONMETROPOLITAN AREA CLINIC 4 | Facility: CLINIC | Age: 73
Setting detail: DERMATOLOGY
End: 2018-10-17

## 2018-10-17 DIAGNOSIS — L73.8 OTHER SPECIFIED FOLLICULAR DISORDERS: ICD-10-CM

## 2018-10-17 DIAGNOSIS — L82.1 OTHER SEBORRHEIC KERATOSIS: ICD-10-CM

## 2018-10-17 DIAGNOSIS — L57.0 ACTINIC KERATOSIS: ICD-10-CM

## 2018-10-17 PROCEDURE — 17004 DESTROY PREMAL LESIONS 15/>: CPT

## 2018-10-17 PROCEDURE — 99213 OFFICE O/P EST LOW 20 MIN: CPT | Mod: 25

## 2018-10-17 PROCEDURE — ? COUNSELING

## 2018-10-17 PROCEDURE — ? LIQUID NITROGEN

## 2018-10-17 ASSESSMENT — LOCATION SIMPLE DESCRIPTION DERM
LOCATION SIMPLE: LEFT FOREHEAD
LOCATION SIMPLE: RIGHT FOREARM
LOCATION SIMPLE: RIGHT TEMPLE
LOCATION SIMPLE: RIGHT EAR
LOCATION SIMPLE: RIGHT ZYGOMA
LOCATION SIMPLE: RIGHT CHEEK
LOCATION SIMPLE: POSTERIOR NECK
LOCATION SIMPLE: RIGHT HAND
LOCATION SIMPLE: LEFT ZYGOMA
LOCATION SIMPLE: LEFT LIP
LOCATION SIMPLE: LEFT CHEEK
LOCATION SIMPLE: LEFT FOREARM
LOCATION SIMPLE: RIGHT NOSE
LOCATION SIMPLE: LEFT HAND

## 2018-10-17 ASSESSMENT — LOCATION DETAILED DESCRIPTION DERM
LOCATION DETAILED: LEFT MEDIAL TRAPEZIAL NECK
LOCATION DETAILED: 2ND WEB SPACE LEFT HAND
LOCATION DETAILED: RIGHT SUPERIOR CENTRAL BUCCAL CHEEK
LOCATION DETAILED: RIGHT SUPERIOR CENTRAL MALAR CHEEK
LOCATION DETAILED: RIGHT CENTRAL TEMPLE
LOCATION DETAILED: RIGHT NASAL SIDEWALL
LOCATION DETAILED: RIGHT TRAGUS
LOCATION DETAILED: 4TH WEB SPACE LEFT HAND
LOCATION DETAILED: RIGHT PROXIMAL RADIAL DORSAL FOREARM
LOCATION DETAILED: RIGHT INFERIOR PREAURICULAR CHEEK
LOCATION DETAILED: LEFT INFERIOR FOREHEAD
LOCATION DETAILED: LEFT DISTAL RADIAL DORSAL FOREARM
LOCATION DETAILED: LEFT INFERIOR MEDIAL FOREHEAD
LOCATION DETAILED: LEFT RADIAL DORSAL HAND
LOCATION DETAILED: LEFT DORSAL THUMB METACARPOPHALANGEAL JOINT
LOCATION DETAILED: LEFT LOWER CUTANEOUS LIP
LOCATION DETAILED: 3RD WEB SPACE LEFT HAND
LOCATION DETAILED: LEFT INFERIOR MEDIAL MALAR CHEEK
LOCATION DETAILED: LEFT DISTAL DORSAL FOREARM
LOCATION DETAILED: RIGHT PROXIMAL ULNAR DORSAL FOREARM
LOCATION DETAILED: LEFT INFERIOR CENTRAL MALAR CHEEK
LOCATION DETAILED: RIGHT RADIAL DORSAL HAND
LOCATION DETAILED: RIGHT DISTAL DORSAL FOREARM
LOCATION DETAILED: LEFT LATERAL MALAR CHEEK
LOCATION DETAILED: LEFT MEDIAL ZYGOMA
LOCATION DETAILED: RIGHT MEDIAL MALAR CHEEK
LOCATION DETAILED: RIGHT LATERAL ZYGOMA
LOCATION DETAILED: RIGHT ULNAR DORSAL HAND
LOCATION DETAILED: LEFT ULNAR DORSAL HAND

## 2018-10-17 ASSESSMENT — LOCATION ZONE DERM
LOCATION ZONE: ARM
LOCATION ZONE: LIP
LOCATION ZONE: NECK
LOCATION ZONE: EAR
LOCATION ZONE: NOSE
LOCATION ZONE: FACE
LOCATION ZONE: HAND

## 2018-10-17 NOTE — PROCEDURE: LIQUID NITROGEN
Duration Of Freeze Thaw-Cycle (Seconds): 1
Detail Level: Detailed
Render Post-Care Instructions In Note?: no
Consent: The patient's consent was obtained including but not limited to risks of crusting, scabbing, blistering, scarring, darker or lighter pigmentary change, recurrence, incomplete removal and infection.
Post-Care Instructions: I reviewed with the patient in detail post-care instructions. Patient is to wear sunprotection, and avoid picking at any of the treated lesions. Pt may apply Vaseline to crusted or scabbing areas.  They were told if any of the lesions fail to resolve to f/u and have them evaluated.
Number Of Freeze-Thaw Cycles: 1 freeze-thaw cycle
Aperture Size (Optional): C

## 2019-01-08 ENCOUNTER — HOSPITAL ENCOUNTER (OUTPATIENT)
Facility: HOSPITAL | Age: 74
Discharge: HOME HEALTH | End: 2019-01-14
Attending: EMERGENCY MEDICINE | Admitting: EMERGENCY MEDICINE
Payer: MEDICARE

## 2019-01-08 DIAGNOSIS — G35 MS (MULTIPLE SCLEROSIS): ICD-10-CM

## 2019-01-08 DIAGNOSIS — N18.30 CHRONIC KIDNEY DISEASE, STAGE III (MODERATE): ICD-10-CM

## 2019-01-08 DIAGNOSIS — Z79.4 TYPE 2 DIABETES MELLITUS WITH HYPERGLYCEMIA, WITH LONG-TERM CURRENT USE OF INSULIN: ICD-10-CM

## 2019-01-08 DIAGNOSIS — R41.82 ALTERED MENTAL STATE: ICD-10-CM

## 2019-01-08 DIAGNOSIS — G93.41 ACUTE METABOLIC ENCEPHALOPATHY: Primary | ICD-10-CM

## 2019-01-08 DIAGNOSIS — I10 ESSENTIAL HYPERTENSION: ICD-10-CM

## 2019-01-08 DIAGNOSIS — I25.118 CORONARY ARTERY DISEASE INVOLVING NATIVE CORONARY ARTERY OF NATIVE HEART WITH OTHER FORM OF ANGINA PECTORIS: ICD-10-CM

## 2019-01-08 DIAGNOSIS — E11.65 TYPE 2 DIABETES MELLITUS WITH HYPERGLYCEMIA, WITH LONG-TERM CURRENT USE OF INSULIN: ICD-10-CM

## 2019-01-08 LAB
ALBUMIN SERPL BCP-MCNC: 4.1 G/DL
ALP SERPL-CCNC: 122 U/L
ALT SERPL W/O P-5'-P-CCNC: 30 U/L
AMPHET+METHAMPHET UR QL: NEGATIVE
ANION GAP SERPL CALC-SCNC: 9 MMOL/L
ANISOCYTOSIS BLD QL SMEAR: SLIGHT
AST SERPL-CCNC: 17 U/L
BACTERIA #/AREA URNS AUTO: NORMAL /HPF
BARBITURATES UR QL SCN>200 NG/ML: NEGATIVE
BASOPHILS # BLD AUTO: 0.04 K/UL
BASOPHILS NFR BLD: 0.6 %
BENZODIAZ UR QL SCN>200 NG/ML: NEGATIVE
BILIRUB SERPL-MCNC: 0.5 MG/DL
BILIRUB UR QL STRIP: NEGATIVE
BNP SERPL-MCNC: <10 PG/ML
BUN SERPL-MCNC: 16 MG/DL
BZE UR QL SCN: NEGATIVE
CALCIUM SERPL-MCNC: 9.2 MG/DL
CANNABINOIDS UR QL SCN: NEGATIVE
CHLORIDE SERPL-SCNC: 97 MMOL/L
CLARITY UR REFRACT.AUTO: CLEAR
CO2 SERPL-SCNC: 24 MMOL/L
COLOR UR AUTO: ABNORMAL
CREAT SERPL-MCNC: 1.5 MG/DL
CREAT UR-MCNC: 49 MG/DL
DIFFERENTIAL METHOD: ABNORMAL
EOSINOPHIL # BLD AUTO: 0.1 K/UL
EOSINOPHIL NFR BLD: 1.7 %
ERYTHROCYTE [DISTWIDTH] IN BLOOD BY AUTOMATED COUNT: 12.6 %
EST. GFR  (AFRICAN AMERICAN): 53.4 ML/MIN/1.73 M^2
EST. GFR  (NON AFRICAN AMERICAN): 46.2 ML/MIN/1.73 M^2
ETHANOL UR-MCNC: <10 MG/DL
GLUCOSE SERPL-MCNC: 465 MG/DL
GLUCOSE UR QL STRIP: ABNORMAL
HCT VFR BLD AUTO: 46.9 %
HGB BLD-MCNC: 16.3 G/DL
HGB UR QL STRIP: NEGATIVE
IMM GRANULOCYTES # BLD AUTO: 0.03 K/UL
IMM GRANULOCYTES NFR BLD AUTO: 0.4 %
INR PPP: 1
KETONES UR QL STRIP: NEGATIVE
LACTATE SERPL-SCNC: 1.8 MMOL/L
LEUKOCYTE ESTERASE UR QL STRIP: NEGATIVE
LIPASE SERPL-CCNC: 25 U/L
LYMPHOCYTES # BLD AUTO: 1.5 K/UL
LYMPHOCYTES NFR BLD: 21.6 %
MAGNESIUM SERPL-MCNC: 2.2 MG/DL
MCH RBC QN AUTO: 30.5 PG
MCHC RBC AUTO-ENTMCNC: 34.8 G/DL
MCV RBC AUTO: 88 FL
METHADONE UR QL SCN>300 NG/ML: NEGATIVE
MICROSCOPIC COMMENT: NORMAL
MONOCYTES # BLD AUTO: 0.4 K/UL
MONOCYTES NFR BLD: 5.2 %
NEUTROPHILS # BLD AUTO: 4.9 K/UL
NEUTROPHILS NFR BLD: 70.5 %
NITRITE UR QL STRIP: NEGATIVE
NRBC BLD-RTO: 0 /100 WBC
OPIATES UR QL SCN: NEGATIVE
PCP UR QL SCN>25 NG/ML: NEGATIVE
PH UR STRIP: 6 [PH] (ref 5–8)
PLATELET # BLD AUTO: 158 K/UL
PLATELET BLD QL SMEAR: ABNORMAL
PMV BLD AUTO: 10.9 FL
POCT GLUCOSE: 409 MG/DL (ref 70–110)
POIKILOCYTOSIS BLD QL SMEAR: SLIGHT
POLYCHROMASIA BLD QL SMEAR: ABNORMAL
POTASSIUM SERPL-SCNC: 4.7 MMOL/L
PROT SERPL-MCNC: 7.3 G/DL
PROT UR QL STRIP: NEGATIVE
PROTHROMBIN TIME: 10.3 SEC
RBC # BLD AUTO: 5.35 M/UL
RBC #/AREA URNS AUTO: 1 /HPF (ref 0–4)
SCHISTOCYTES BLD QL SMEAR: ABNORMAL
SODIUM SERPL-SCNC: 130 MMOL/L
SP GR UR STRIP: 1.02 (ref 1–1.03)
SPHEROCYTES BLD QL SMEAR: ABNORMAL
TOXICOLOGY INFORMATION: NORMAL
TROPONIN I SERPL DL<=0.01 NG/ML-MCNC: 0.01 NG/ML
URN SPEC COLLECT METH UR: ABNORMAL
WBC # BLD AUTO: 6.98 K/UL
WBC #/AREA URNS AUTO: 1 /HPF (ref 0–5)
YEAST UR QL AUTO: NORMAL

## 2019-01-08 PROCEDURE — 83036 HEMOGLOBIN GLYCOSYLATED A1C: CPT

## 2019-01-08 PROCEDURE — 25000003 PHARM REV CODE 250: Performed by: STUDENT IN AN ORGANIZED HEALTH CARE EDUCATION/TRAINING PROGRAM

## 2019-01-08 PROCEDURE — 83605 ASSAY OF LACTIC ACID: CPT

## 2019-01-08 PROCEDURE — 99285 PR EMERGENCY DEPT VISIT,LEVEL V: ICD-10-PCS | Mod: ,,, | Performed by: EMERGENCY MEDICINE

## 2019-01-08 PROCEDURE — 99285 EMERGENCY DEPT VISIT HI MDM: CPT | Mod: 25

## 2019-01-08 PROCEDURE — 93005 ELECTROCARDIOGRAM TRACING: CPT

## 2019-01-08 PROCEDURE — 93010 EKG 12-LEAD: ICD-10-PCS | Mod: ,,, | Performed by: INTERNAL MEDICINE

## 2019-01-08 PROCEDURE — 84484 ASSAY OF TROPONIN QUANT: CPT

## 2019-01-08 PROCEDURE — 96361 HYDRATE IV INFUSION ADD-ON: CPT

## 2019-01-08 PROCEDURE — 96360 HYDRATION IV INFUSION INIT: CPT

## 2019-01-08 PROCEDURE — 85025 COMPLETE CBC W/AUTO DIFF WBC: CPT

## 2019-01-08 PROCEDURE — 82962 GLUCOSE BLOOD TEST: CPT

## 2019-01-08 PROCEDURE — 81001 URINALYSIS AUTO W/SCOPE: CPT | Mod: 59

## 2019-01-08 PROCEDURE — 83735 ASSAY OF MAGNESIUM: CPT

## 2019-01-08 PROCEDURE — 99285 EMERGENCY DEPT VISIT HI MDM: CPT | Mod: ,,, | Performed by: EMERGENCY MEDICINE

## 2019-01-08 PROCEDURE — 93010 ELECTROCARDIOGRAM REPORT: CPT | Mod: ,,, | Performed by: INTERNAL MEDICINE

## 2019-01-08 PROCEDURE — 80307 DRUG TEST PRSMV CHEM ANLYZR: CPT

## 2019-01-08 PROCEDURE — 83690 ASSAY OF LIPASE: CPT

## 2019-01-08 PROCEDURE — 80053 COMPREHEN METABOLIC PANEL: CPT

## 2019-01-08 PROCEDURE — 83880 ASSAY OF NATRIURETIC PEPTIDE: CPT

## 2019-01-08 PROCEDURE — 85610 PROTHROMBIN TIME: CPT

## 2019-01-08 RX ADMIN — SODIUM CHLORIDE, SODIUM LACTATE, POTASSIUM CHLORIDE, AND CALCIUM CHLORIDE 2000 ML: .6; .31; .03; .02 INJECTION, SOLUTION INTRAVENOUS at 09:01

## 2019-01-09 PROBLEM — N18.30 CHRONIC KIDNEY DISEASE, STAGE III (MODERATE): Status: ACTIVE | Noted: 2019-01-09

## 2019-01-09 PROBLEM — G93.41 ACUTE METABOLIC ENCEPHALOPATHY: Status: ACTIVE | Noted: 2019-01-09

## 2019-01-09 PROBLEM — R41.82 ALTERED MENTAL STATE: Status: ACTIVE | Noted: 2019-01-09

## 2019-01-09 PROBLEM — E87.1 HYPONATREMIA: Status: ACTIVE | Noted: 2019-01-09

## 2019-01-09 LAB
ANION GAP SERPL CALC-SCNC: 10 MMOL/L
BASOPHILS # BLD AUTO: 0.04 K/UL
BASOPHILS NFR BLD: 0.5 %
BUN SERPL-MCNC: 15 MG/DL
CALCIUM SERPL-MCNC: 9.1 MG/DL
CHLORIDE SERPL-SCNC: 99 MMOL/L
CO2 SERPL-SCNC: 25 MMOL/L
CREAT SERPL-MCNC: 1.4 MG/DL
DIFFERENTIAL METHOD: NORMAL
EOSINOPHIL # BLD AUTO: 0.1 K/UL
EOSINOPHIL NFR BLD: 0.9 %
ERYTHROCYTE [DISTWIDTH] IN BLOOD BY AUTOMATED COUNT: 12.6 %
EST. GFR  (AFRICAN AMERICAN): 58 ML/MIN/1.73 M^2
EST. GFR  (NON AFRICAN AMERICAN): 50.2 ML/MIN/1.73 M^2
ESTIMATED AVG GLUCOSE: 249 MG/DL
ESTIMATED AVG GLUCOSE: 255 MG/DL
GLUCOSE SERPL-MCNC: 547 MG/DL
HBA1C MFR BLD HPLC: 10.3 %
HBA1C MFR BLD HPLC: 10.5 %
HCT VFR BLD AUTO: 42.7 %
HGB BLD-MCNC: 14.5 G/DL
IMM GRANULOCYTES # BLD AUTO: 0.03 K/UL
IMM GRANULOCYTES NFR BLD AUTO: 0.4 %
LYMPHOCYTES # BLD AUTO: 2.1 K/UL
LYMPHOCYTES NFR BLD: 27 %
MAGNESIUM SERPL-MCNC: 2 MG/DL
MCH RBC QN AUTO: 29.4 PG
MCHC RBC AUTO-ENTMCNC: 34 G/DL
MCV RBC AUTO: 86 FL
MONOCYTES # BLD AUTO: 0.6 K/UL
MONOCYTES NFR BLD: 7 %
NEUTROPHILS # BLD AUTO: 5 K/UL
NEUTROPHILS NFR BLD: 64.2 %
NRBC BLD-RTO: 0 /100 WBC
PHOSPHATE SERPL-MCNC: 3 MG/DL
PLATELET # BLD AUTO: 156 K/UL
PMV BLD AUTO: 10.8 FL
POCT GLUCOSE: 116 MG/DL (ref 70–110)
POCT GLUCOSE: 128 MG/DL (ref 70–110)
POCT GLUCOSE: 203 MG/DL (ref 70–110)
POCT GLUCOSE: 229 MG/DL (ref 70–110)
POCT GLUCOSE: 229 MG/DL (ref 70–110)
POCT GLUCOSE: 233 MG/DL (ref 70–110)
POCT GLUCOSE: 263 MG/DL (ref 70–110)
POCT GLUCOSE: 334 MG/DL (ref 70–110)
POCT GLUCOSE: 363 MG/DL (ref 70–110)
POCT GLUCOSE: 373 MG/DL (ref 70–110)
POCT GLUCOSE: 93 MG/DL (ref 70–110)
POTASSIUM SERPL-SCNC: 4.3 MMOL/L
RBC # BLD AUTO: 4.94 M/UL
SODIUM SERPL-SCNC: 134 MMOL/L
WBC # BLD AUTO: 7.82 K/UL

## 2019-01-09 PROCEDURE — 25000003 PHARM REV CODE 250: Performed by: PHYSICIAN ASSISTANT

## 2019-01-09 PROCEDURE — 99205 OFFICE O/P NEW HI 60 MIN: CPT | Mod: GC,,, | Performed by: PSYCHIATRY & NEUROLOGY

## 2019-01-09 PROCEDURE — 99225 PR SUBSEQUENT OBSERVATION CARE,LEVEL II: ICD-10-PCS | Mod: ,,, | Performed by: INTERNAL MEDICINE

## 2019-01-09 PROCEDURE — 85025 COMPLETE CBC W/AUTO DIFF WBC: CPT

## 2019-01-09 PROCEDURE — 63600175 PHARM REV CODE 636 W HCPCS: Performed by: PHYSICIAN ASSISTANT

## 2019-01-09 PROCEDURE — 36415 COLL VENOUS BLD VENIPUNCTURE: CPT

## 2019-01-09 PROCEDURE — S0166 INJ OLANZAPINE 2.5MG: HCPCS | Performed by: PHYSICIAN ASSISTANT

## 2019-01-09 PROCEDURE — 84100 ASSAY OF PHOSPHORUS: CPT

## 2019-01-09 PROCEDURE — 82962 GLUCOSE BLOOD TEST: CPT

## 2019-01-09 PROCEDURE — 83735 ASSAY OF MAGNESIUM: CPT

## 2019-01-09 PROCEDURE — 99225 PR SUBSEQUENT OBSERVATION CARE,LEVEL II: CPT | Mod: ,,, | Performed by: INTERNAL MEDICINE

## 2019-01-09 PROCEDURE — 99220 PR INITIAL OBSERVATION CARE,LEVL III: ICD-10-PCS | Mod: ,,, | Performed by: PHYSICIAN ASSISTANT

## 2019-01-09 PROCEDURE — S5571 INSULIN DISPOS PEN 3 ML: HCPCS | Performed by: PHYSICIAN ASSISTANT

## 2019-01-09 PROCEDURE — 99205 PR OFFICE/OUTPT VISIT, NEW, LEVL V, 60-74 MIN: ICD-10-PCS | Mod: GC,,, | Performed by: PSYCHIATRY & NEUROLOGY

## 2019-01-09 PROCEDURE — A9585 GADOBUTROL INJECTION: HCPCS | Performed by: INTERNAL MEDICINE

## 2019-01-09 PROCEDURE — G0378 HOSPITAL OBSERVATION PER HR: HCPCS

## 2019-01-09 PROCEDURE — 99220 PR INITIAL OBSERVATION CARE,LEVL III: CPT | Mod: ,,, | Performed by: PHYSICIAN ASSISTANT

## 2019-01-09 PROCEDURE — 80048 BASIC METABOLIC PNL TOTAL CA: CPT

## 2019-01-09 PROCEDURE — 96372 THER/PROPH/DIAG INJ SC/IM: CPT | Mod: 59

## 2019-01-09 PROCEDURE — 83036 HEMOGLOBIN GLYCOSYLATED A1C: CPT

## 2019-01-09 PROCEDURE — 25500020 PHARM REV CODE 255: Performed by: INTERNAL MEDICINE

## 2019-01-09 RX ORDER — ACETAMINOPHEN 325 MG/1
650 TABLET ORAL EVERY 4 HOURS PRN
Status: DISCONTINUED | OUTPATIENT
Start: 2019-01-09 | End: 2019-01-14 | Stop reason: HOSPADM

## 2019-01-09 RX ORDER — OLANZAPINE 2.5 MG/1
2.5 TABLET ORAL ONCE AS NEEDED
Status: DISCONTINUED | OUTPATIENT
Start: 2019-01-09 | End: 2019-01-09

## 2019-01-09 RX ORDER — GADOBUTROL 604.72 MG/ML
10 INJECTION INTRAVENOUS
Status: COMPLETED | OUTPATIENT
Start: 2019-01-09 | End: 2019-01-09

## 2019-01-09 RX ORDER — POLYETHYLENE GLYCOL 3350 17 G/17G
17 POWDER, FOR SOLUTION ORAL DAILY
Status: DISCONTINUED | OUTPATIENT
Start: 2019-01-09 | End: 2019-01-14 | Stop reason: HOSPADM

## 2019-01-09 RX ORDER — ONDANSETRON 8 MG/1
8 TABLET, ORALLY DISINTEGRATING ORAL EVERY 8 HOURS PRN
Status: DISCONTINUED | OUTPATIENT
Start: 2019-01-09 | End: 2019-01-14 | Stop reason: HOSPADM

## 2019-01-09 RX ORDER — BUTALBITAL, ACETAMINOPHEN AND CAFFEINE 50; 325; 40 MG/1; MG/1; MG/1
1 TABLET ORAL EVERY 6 HOURS PRN
Status: DISCONTINUED | OUTPATIENT
Start: 2019-01-09 | End: 2019-01-14 | Stop reason: HOSPADM

## 2019-01-09 RX ORDER — BISACODYL 10 MG
10 SUPPOSITORY, RECTAL RECTAL DAILY PRN
Status: DISCONTINUED | OUTPATIENT
Start: 2019-01-09 | End: 2019-01-14 | Stop reason: HOSPADM

## 2019-01-09 RX ORDER — IPRATROPIUM BROMIDE AND ALBUTEROL SULFATE 2.5; .5 MG/3ML; MG/3ML
3 SOLUTION RESPIRATORY (INHALATION) EVERY 4 HOURS PRN
Status: DISCONTINUED | OUTPATIENT
Start: 2019-01-09 | End: 2019-01-14 | Stop reason: HOSPADM

## 2019-01-09 RX ORDER — BUDESONIDE AND FORMOTEROL FUMARATE DIHYDRATE 160; 4.5 UG/1; UG/1
2 AEROSOL RESPIRATORY (INHALATION) EVERY 12 HOURS
Status: DISCONTINUED | OUTPATIENT
Start: 2019-01-09 | End: 2019-01-14 | Stop reason: HOSPADM

## 2019-01-09 RX ORDER — SIMVASTATIN 20 MG/1
80 TABLET, FILM COATED ORAL NIGHTLY
Status: DISCONTINUED | OUTPATIENT
Start: 2019-01-09 | End: 2019-01-14 | Stop reason: HOSPADM

## 2019-01-09 RX ORDER — GLUCAGON 1 MG
1 KIT INJECTION
Status: DISCONTINUED | OUTPATIENT
Start: 2019-01-09 | End: 2019-01-14 | Stop reason: HOSPADM

## 2019-01-09 RX ORDER — HEPARIN SODIUM 5000 [USP'U]/ML
5000 INJECTION, SOLUTION INTRAVENOUS; SUBCUTANEOUS EVERY 8 HOURS
Status: DISCONTINUED | OUTPATIENT
Start: 2019-01-09 | End: 2019-01-14 | Stop reason: HOSPADM

## 2019-01-09 RX ORDER — TRAMADOL HYDROCHLORIDE 50 MG/1
50 TABLET ORAL EVERY 6 HOURS PRN
Status: DISCONTINUED | OUTPATIENT
Start: 2019-01-09 | End: 2019-01-09

## 2019-01-09 RX ORDER — INSULIN ASPART 100 [IU]/ML
8 INJECTION, SOLUTION INTRAVENOUS; SUBCUTANEOUS
Status: DISCONTINUED | OUTPATIENT
Start: 2019-01-09 | End: 2019-01-09

## 2019-01-09 RX ORDER — INSULIN ASPART 100 [IU]/ML
0-5 INJECTION, SOLUTION INTRAVENOUS; SUBCUTANEOUS
Status: DISCONTINUED | OUTPATIENT
Start: 2019-01-09 | End: 2019-01-14 | Stop reason: HOSPADM

## 2019-01-09 RX ORDER — ACETAMINOPHEN 500 MG
1000 TABLET ORAL EVERY 8 HOURS PRN
Status: DISCONTINUED | OUTPATIENT
Start: 2019-01-09 | End: 2019-01-14 | Stop reason: HOSPADM

## 2019-01-09 RX ORDER — SERTRALINE HYDROCHLORIDE 100 MG/1
100 TABLET, FILM COATED ORAL DAILY
Status: DISCONTINUED | OUTPATIENT
Start: 2019-01-09 | End: 2019-01-14 | Stop reason: HOSPADM

## 2019-01-09 RX ORDER — CLOPIDOGREL BISULFATE 75 MG/1
75 TABLET ORAL DAILY
Status: DISCONTINUED | OUTPATIENT
Start: 2019-01-09 | End: 2019-01-14 | Stop reason: HOSPADM

## 2019-01-09 RX ORDER — IBUPROFEN 200 MG
16 TABLET ORAL
Status: DISCONTINUED | OUTPATIENT
Start: 2019-01-09 | End: 2019-01-14 | Stop reason: HOSPADM

## 2019-01-09 RX ORDER — HYDRALAZINE HYDROCHLORIDE 25 MG/1
25 TABLET, FILM COATED ORAL EVERY 8 HOURS PRN
Status: DISCONTINUED | OUTPATIENT
Start: 2019-01-09 | End: 2019-01-14 | Stop reason: HOSPADM

## 2019-01-09 RX ORDER — SODIUM CHLORIDE 0.9 % (FLUSH) 0.9 %
5 SYRINGE (ML) INJECTION
Status: DISCONTINUED | OUTPATIENT
Start: 2019-01-09 | End: 2019-01-14 | Stop reason: HOSPADM

## 2019-01-09 RX ORDER — LOSARTAN POTASSIUM 50 MG/1
50 TABLET ORAL DAILY
Status: DISCONTINUED | OUTPATIENT
Start: 2019-01-09 | End: 2019-01-14 | Stop reason: HOSPADM

## 2019-01-09 RX ORDER — OLANZAPINE 2.5 MG/1
5 TABLET ORAL 3 TIMES DAILY PRN
Status: DISCONTINUED | OUTPATIENT
Start: 2019-01-09 | End: 2019-01-14 | Stop reason: HOSPADM

## 2019-01-09 RX ORDER — IBUPROFEN 200 MG
24 TABLET ORAL
Status: DISCONTINUED | OUTPATIENT
Start: 2019-01-09 | End: 2019-01-14 | Stop reason: HOSPADM

## 2019-01-09 RX ORDER — RAMELTEON 8 MG/1
8 TABLET ORAL NIGHTLY PRN
Status: DISCONTINUED | OUTPATIENT
Start: 2019-01-09 | End: 2019-01-14 | Stop reason: HOSPADM

## 2019-01-09 RX ORDER — INSULIN ASPART 100 [IU]/ML
10 INJECTION, SOLUTION INTRAVENOUS; SUBCUTANEOUS
Status: DISCONTINUED | OUTPATIENT
Start: 2019-01-09 | End: 2019-01-14 | Stop reason: HOSPADM

## 2019-01-09 RX ORDER — METOPROLOL SUCCINATE 50 MG/1
50 TABLET, EXTENDED RELEASE ORAL DAILY
Status: DISCONTINUED | OUTPATIENT
Start: 2019-01-09 | End: 2019-01-14 | Stop reason: HOSPADM

## 2019-01-09 RX ORDER — OLANZAPINE 10 MG/2ML
5 INJECTION, POWDER, FOR SOLUTION INTRAMUSCULAR ONCE AS NEEDED
Status: COMPLETED | OUTPATIENT
Start: 2019-01-09 | End: 2019-01-09

## 2019-01-09 RX ADMIN — INSULIN DETEMIR 10 UNITS: 100 INJECTION, SOLUTION SUBCUTANEOUS at 07:01

## 2019-01-09 RX ADMIN — METOPROLOL SUCCINATE 50 MG: 50 TABLET, EXTENDED RELEASE ORAL at 02:01

## 2019-01-09 RX ADMIN — INSULIN ASPART 2 UNITS: 100 INJECTION, SOLUTION INTRAVENOUS; SUBCUTANEOUS at 10:01

## 2019-01-09 RX ADMIN — SIMVASTATIN 80 MG: 20 TABLET, FILM COATED ORAL at 02:01

## 2019-01-09 RX ADMIN — INSULIN ASPART 8 UNITS: 100 INJECTION, SOLUTION INTRAVENOUS; SUBCUTANEOUS at 06:01

## 2019-01-09 RX ADMIN — INSULIN ASPART 2 UNITS: 100 INJECTION, SOLUTION INTRAVENOUS; SUBCUTANEOUS at 02:01

## 2019-01-09 RX ADMIN — HEPARIN SODIUM 5000 UNITS: 5000 INJECTION, SOLUTION INTRAVENOUS; SUBCUTANEOUS at 02:01

## 2019-01-09 RX ADMIN — INSULIN ASPART 8 UNITS: 100 INJECTION, SOLUTION INTRAVENOUS; SUBCUTANEOUS at 02:01

## 2019-01-09 RX ADMIN — RAMELTEON 8 MG: 8 TABLET, FILM COATED ORAL at 02:01

## 2019-01-09 RX ADMIN — CLOPIDOGREL 75 MG: 75 TABLET, FILM COATED ORAL at 02:01

## 2019-01-09 RX ADMIN — HEPARIN SODIUM 5000 UNITS: 5000 INJECTION, SOLUTION INTRAVENOUS; SUBCUTANEOUS at 06:01

## 2019-01-09 RX ADMIN — LOSARTAN POTASSIUM 50 MG: 50 TABLET, FILM COATED ORAL at 02:01

## 2019-01-09 RX ADMIN — SERTRALINE 100 MG: 100 TABLET, FILM COATED ORAL at 02:01

## 2019-01-09 RX ADMIN — OLANZAPINE 5 MG: 10 INJECTION, POWDER, FOR SOLUTION INTRAMUSCULAR at 06:01

## 2019-01-09 RX ADMIN — SODIUM CHLORIDE, SODIUM LACTATE, POTASSIUM CHLORIDE, AND CALCIUM CHLORIDE 1000 ML: .6; .31; .03; .02 INJECTION, SOLUTION INTRAVENOUS at 06:01

## 2019-01-09 RX ADMIN — INSULIN DETEMIR 26 UNITS: 100 INJECTION, SOLUTION SUBCUTANEOUS at 02:01

## 2019-01-09 RX ADMIN — GADOBUTROL 10 ML: 604.72 INJECTION INTRAVENOUS at 01:01

## 2019-01-09 RX ADMIN — HEPARIN SODIUM 5000 UNITS: 5000 INJECTION, SOLUTION INTRAVENOUS; SUBCUTANEOUS at 10:01

## 2019-01-09 RX ADMIN — INSULIN DETEMIR 30 UNITS: 100 INJECTION, SOLUTION SUBCUTANEOUS at 10:01

## 2019-01-09 RX ADMIN — INSULIN ASPART 10 UNITS: 100 INJECTION, SOLUTION INTRAVENOUS; SUBCUTANEOUS at 06:01

## 2019-01-09 RX ADMIN — HYDRALAZINE HYDROCHLORIDE 25 MG: 25 TABLET ORAL at 02:01

## 2019-01-09 NOTE — H&P
[facility] Hospital Medicine  History & Physical    Patient Name: Vicente Luciano  MRN: [id number]  Admission Date: 1/8/2019  Attending Physician: Sheryl Manuel MD   Primary Care Provider: MINGO Garvin MD    [facility] Medicine Team: [facility] D Jose Anguiano PA-C     Patient information was obtained from patient, relative(s), past medical records and ER records.     Subjective:     Principal Problem:Acute metabolic encephalopathy    Chief Complaint:   Chief Complaint   Patient presents with    Altered Mental Status     Pt brought to ED via NO EMS. Wife states pt started to have AMS @ 1815. Per EMS pt with garbled speech.         HPI: Vicente Luciano is a 73M with HTN, MS, CAD, T2DM, COPD who presents for evaluation of AMS. Per wife, around 1815 on 01/08 the patient had acute onset nonsensical speech. She reports his speech was clear, but he was acting aggressively and erratically - at one point talking to and hugging a fan. The patient is in charge of his own medications, and usually does just fine with them, however, today the patient didn't take his medications including his SQ insulin. Otherwise he was in his usual state of health, no recent falls. He was altered after having some ice cream and fig newtons. They deny any new medications, no EtOH or drug use, he has a Rx for xanax but doesn't take it, no changes on PO intake, changes in sleep, dysuria, facial droop, unilateral weakness, fever, abdominal pain, neck stiffness, vision changes, falls, CP, SOB. He endorses a HA. Since his presentation his though process has cleared up but the patient remains slightly off his baseline, but is about 50-75% there.    ED: labs unremarkable w/ exception of glucose of 465, SBP uncontrolled 180s, UA neg, UDS neg, CTH w/o acute changes, CXR clear, MRI pending.    Past Medical History:   Diagnosis Date    CAD (coronary artery disease) 2/4/2014    PTCA/stent 2010    CHRONIC BRONCHITIS      Diabetes mellitus, type 2 2001    DJD (degenerative joint disease) of lumbar spine 2/4/2014    HTN (hypertension), benign 1987    MS (multiple sclerosis) 1990       Past Surgical History:   Procedure Laterality Date    CARDIAC SURGERY      CORONARY STENT PLACEMENT      LUNG SURGERY         Review of patient's allergies indicates:   Allergen Reactions    Pcn [penicillins] Itching       No current facility-administered medications on file prior to encounter.      Current Outpatient Medications on File Prior to Encounter   Medication Sig    albuterol (ACCUNEB) 0.63 mg/3 mL Nebu Take 0.63 mg by nebulization every 6 (six) hours as needed.    budesonide-formoterol 160-4.5 mcg (SYMBICORT) 160-4.5 mcg/actuation HFAA Inhale 2 puffs into the lungs every 12 (twelve) hours.    butalbital-acetaminophen-caffeine -40 mg (FIORICET, ESGIC) -40 mg per tablet Take 1 tablet by mouth every 6 (six) hours as needed for Pain or Headaches.    cholecalciferol, vitamin D3, 5,000 unit Tab Take 5,000 Units by mouth once daily.    clopidogrel (PLAVIX) 75 mg tablet Take 75 mg by mouth once daily.    flunisolide 25 mcg, 0.025%, (NASALIDE) 25 mcg (0.025 %) Spry 2 sprays by Nasal route 2 (two) times daily.    glipiZIDE (GLUCOTROL) 5 MG tablet Take 5 mg by mouth 2 (two) times daily before meals.    metformin (GLUCOPHAGE) 1000 MG tablet Take 1,000 mg by mouth 2 (two) times daily with meals.    sertraline (ZOLOFT) 100 MG tablet Take 100 mg by mouth once daily.    simvastatin (ZOCOR) 80 MG tablet Take 80 mg by mouth every evening.    valsartan (DIOVAN) 160 MG tablet Take 160 mg by mouth once daily.    alprazolam (XANAX) 1 MG tablet Take 1 mg by mouth 3 (three) times daily as needed.    interferon beta-1a (AVONEX) 30 mcg/0.5 mL injection Inject 0.5 mLs (30 mcg total) into the muscle every 7 days.    linagliptin (TRADJENTA) 5 mg Tab tablet Take 1 tablet (5 mg total) by mouth once daily.    loratadine (CLARITIN) 10 mg  tablet Take 10 mg by mouth daily as needed.    metoprolol succinate (TOPROL-XL) 25 MG 24 hr tablet Take 2 tablets (50 mg total) by mouth once daily.    omeprazole (PRILOSEC) 20 MG capsule Take 20 mg by mouth daily as needed.    tramadol (ULTRAM) 50 mg tablet Take 50 mg by mouth every 6 (six) hours as needed for Pain.    trazodone (DESYREL) 50 MG tablet Take 1 tablet (50 mg total) by mouth nightly as needed for Insomnia.     Family History     None        Tobacco Use    Smoking status: Former Smoker     Packs/day: 2.00     Years: 22.00     Pack years: 44.00     Types: Cigarettes     Last attempt to quit: 1989     Years since quittin.3    Smokeless tobacco: Never Used    Tobacco comment: Quit smoking (2 pk/day) 1987   Substance and Sexual Activity    Alcohol use: Yes     Comment: rare    Drug use: No    Sexual activity: Not on file     Review of Systems   Constitutional: Negative for chills, fatigue and fever.   HENT: Negative for postnasal drip, sinus pain and sore throat.    Eyes: Negative for photophobia and visual disturbance.   Respiratory: Negative for cough, shortness of breath and wheezing.    Cardiovascular: Negative for chest pain, palpitations and leg swelling.   Gastrointestinal: Negative for abdominal distention, abdominal pain, diarrhea, nausea and vomiting.   Genitourinary: Negative for difficulty urinating, dysuria and enuresis.   Musculoskeletal: Negative for arthralgias, back pain and gait problem.   Skin: Negative for rash and wound.   Neurological: Positive for headaches. Negative for dizziness, tremors, seizures, weakness, light-headedness and numbness.   Psychiatric/Behavioral: Positive for agitation, behavioral problems and confusion.     Objective:     Vital Signs (Most Recent):  Temp: 97.1 °F (36.2 °C) (19)  Pulse: 92 (19)  Resp: 20 (19)  BP: (!) 188/86 (19)  SpO2: 99 % (19) Vital Signs (24h Range):  Temp:  [97.1 °F  (36.2 °C)] 97.1 °F (36.2 °C)  Pulse:  [] 92  Resp:  [19-20] 20  SpO2:  [97 %-99 %] 99 %  BP: (162-188)/() 188/86     Weight: 104.3 kg (230 lb)  Body mass index is 30.34 kg/m².    Physical Exam   Constitutional: He is oriented to person, place, and time. He appears well-developed and well-nourished. No distress.   HENT:   Head: Normocephalic and atraumatic.   Eyes: EOM are normal.   Neck: Normal range of motion. Neck supple.   Cardiovascular: Normal rate and regular rhythm.   No murmur heard.  Pulmonary/Chest: Effort normal and breath sounds normal. No stridor. No respiratory distress.   Abdominal: Soft. Bowel sounds are normal. He exhibits no distension. There is no tenderness.   Musculoskeletal: Normal range of motion. He exhibits no edema or deformity.   No focal neuro findings, 5/5 strength throughout, able to position himself in bed independently, speech clear, AOx4, joking with family   Neurological: He is alert and oriented to person, place, and time. No cranial nerve deficit.   No focal neuro findings   Skin: Skin is warm and dry. No rash noted. He is not diaphoretic. No erythema.   Psychiatric: He has a normal mood and affect. His behavior is normal. Judgment and thought content normal.   Nursing note and vitals reviewed.        CRANIAL NERVES     CN III, IV, VI   Extraocular motions are normal.        Significant Labs:   CBC:   Recent Labs   Lab 01/08/19 2007   WBC 6.98   HGB 16.3   HCT 46.9        CMP:   Recent Labs   Lab 01/08/19 2022   *   K 4.7   CL 97   CO2 24   *   BUN 16   CREATININE 1.5*   CALCIUM 9.2   PROT 7.3   ALBUMIN 4.1   BILITOT 0.5   ALKPHOS 122   AST 17   ALT 30   ANIONGAP 9   EGFRNONAA 46.2*     Cardiac Markers:   Recent Labs   Lab 01/08/19 2022   BNP <10     Lactic Acid:   Recent Labs   Lab 01/08/19 2007   LACTATE 1.8     Troponin:   Recent Labs   Lab 01/08/19 2022   TROPONINI 0.007     TSH: No results for input(s): TSH in the last 4320 hours.  Urine  Culture: No results for input(s): LABURIN in the last 48 hours.  Urine Studies:   Recent Labs   Lab 01/08/19 2054   COLORU Straw   APPEARANCEUA Clear   PHUR 6.0   SPECGRAV 1.025   PROTEINUA Negative   GLUCUA 3+*   KETONESU Negative   BILIRUBINUA Negative   OCCULTUA Negative   NITRITE Negative   LEUKOCYTESUR Negative   RBCUA 1   WBCUA 1   BACTERIA None       Significant Imaging: I have reviewed all pertinent imaging results/findings within the past 24 hours.    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 and extensive microvascular changes  - MRI pending  - already improving with correction of hyperglycemia  - neuro checks, delirium precautions     Hyponatremia    - likely psuedo in setting of hyperglycemia  - corrects to 139     Chronic kidney disease, stage III (moderate)    - chronic and stable     Type 2 diabetes mellitus with hyperglycemia, with long-term current use of insulin    - hold oral hypoglycemics  - start weight based hospital insulin regimen: detemir 26U daily, aspart 8 U TIDWM, low dose SSI  - check A1c     CAD (coronary artery disease)    - continue statin, DAPT  - trop and EKG negative, no CP or SOB     MS (multiple sclerosis)    - hold home avonex injections as not on formulary     Essential hypertension    - continue home medications: metoprolol succinate 50 mg daily, change valsartan 160 mg to losartan 50 mg daily  - uncontrolled on admit  - hydralazine PRN     COPD (chronic obstructive pulmonary disease) with chronic bronchitis    - continue controller and nebs       VTE Risk Mitigation (From admission, onward)        Ordered     heparin (porcine) injection 5,000 Units  Every 8 hours      01/09/19 0051     IP VTE HIGH RISK PATIENT  Once      01/09/19 0051             Jose Anguiano PA-C  Department of Hospital Medicine   [facility]  [place]

## 2019-01-09 NOTE — ASSESSMENT & PLAN NOTE
Dx 1990s, said to follow at [facility] and per primary team, on Avonex  Pt unable to name Neurologist and does not know when he was last seen    MRI with moderate disease burden and one new focus of active demyelination in left caudate head    --would hold off on IV Solumedrol at this time in setting of uncontrolled hyperglycemia and potential for steroid-induced psychosis

## 2019-01-09 NOTE — ED NOTES
Patient Identifiers for Vicente Luciano checked and correct  LOC: The patient is awake, alert and aware of environment with an appropriate affect, the patient is oriented to person and place states date as 2000, and speaking appropriate.  APPEARANCE: Patient resting comfortably and in no acute distress, patient is clean and well groomed, patient's clothing is properly fastened.  SKIN: The skin is warm and dry, patient has normal skin turgor and moist mucus membranes,no rashes or lesions.Skin Intact , No Breakdown Noted  Musculoskeletal :  Normal range of motion noted. Moves all extremeties well, No swelling or tenderness noted  RESPIRATORY: Airway is open and patent, respirations are spontaneous, patient has a normal effort and rate.  CARDIAC: Patient has a normal rate and rhythm, no periphreal edema noted, capillary refill < 3 seconds.   ABDOMEN: Soft and non tender to palpation, no distention noted.   PULSES: 2+  And symmetrical in all extremeties  NEUROLOGIC: PERRL. facial expression is symmetrical, patient moving all extremities, normal sensation in all extremities when touched with a finger.The patient is awake, alert and cooperative with a calm affect, patient is aware of environment.    Will continue to monitor

## 2019-01-09 NOTE — MEDICAL/APP STUDENT
Hospital Medicine  Progress Note    Patient Name: Vicente Luciano  MRN: [id number]  [place]: [facility] Sheryl Manuel MD   Admit Date: 1/8/2019  NEDRA 1/11/2019  Code status: Full Code    Principal Problem:  Acute metabolic encephalopathy    Interval history:  Remains delirious. BGs improving.    Review of Systems   Constitutional: Negative for fever.   Respiratory: Negative for shortness of breath.        Physical Exam:  Pulse:  [84-92]   Resp:  [18-20]   BP: (153-188)/()   SpO2:  [96 %-99 %]      Temp: 97.1 °F (36.2 °C) (01/08/19 1912)  Pulse: 89 (01/09/19 0243)  Resp: 18 (01/09/19 0242)  BP: (!) 153/83 (01/09/19 0243)  SpO2: 96 % (01/09/19 0243)    Intake/Output Summary (Last 24 hours) at 1/9/2019 2023  Last data filed at 1/9/2019 0143  Gross per 24 hour   Intake 2000 ml   Output --   Net 2000 ml     Weight: 104.3 kg (230 lb)  Body mass index is 30.34 kg/m².    Physical Exam   Constitutional: No distress.   Eyes: Conjunctivae and lids are normal.   Cardiovascular: S1 normal and S2 normal.   Pulmonary/Chest: Effort normal and breath sounds normal.   Abdominal: Soft. Bowel sounds are normal. There is no tenderness.   Musculoskeletal: He exhibits no edema.   Neurological: He is alert. He is disoriented.   Psychiatric: His affect is inappropriate.       Significant Labs:  Recent Labs   Lab 01/08/19 2007 01/09/19  0530   WBC 6.98 7.82   HGB 16.3 14.5   HCT 46.9 42.7    156     Recent Labs   Lab 01/08/19 2022 01/09/19  0529   * 134*   K 4.7 4.3   CL 97 99   CO2 24 25   BUN 16 15   CREATININE 1.5* 1.4   * 547*   CALCIUM 9.2 9.1   MG 2.2 2.0   PHOS  --  3.0   ALKPHOS 122  --    ALT 30  --    AST 17  --    ALBUMIN 4.1  --    PROT 7.3  --    BILITOT 0.5  --    INR 1.0  --    LIPASE 25  --      Recent Labs   Lab 01/09/19  0159 01/09/19  0720 01/09/19  0748 01/09/19  0841 01/09/19  1057 01/09/19  1343   POCTGLUCOSE 263* 334* 363* 229* 203* 229*     A1C:   Recent Labs   Lab 01/08/19  2007  01/09/19  0529   HGBA1C 10.3* 10.5*     Recent Labs   Lab 01/08/19 2022   TROPONINI 0.007     Lactic Acid:   Recent Labs   Lab 01/08/19 2007   LACTATE 1.8     Urine Studies:   Recent Labs   Lab 01/08/19 2054   COLORU Straw   APPEARANCEUA Clear   PHUR 6.0   SPECGRAV 1.025   PROTEINUA Negative   GLUCUA 3+*   KETONESU Negative   BILIRUBINUA Negative   OCCULTUA Negative   NITRITE Negative   LEUKOCYTESUR Negative   RBCUA 1   WBCUA 1   BACTERIA None       Inpatient Medications prescribed for management of current Problems:   Scheduled Meds:    budesonide-formoterol 160-4.5 mcg  2 puff Inhalation Q12H    clopidogrel  75 mg Oral Daily    heparin (porcine)  5,000 Units Subcutaneous Q8H    insulin aspart U-100  10 Units Subcutaneous TIDWM    insulin detemir U-100  30 Units Subcutaneous QHS    losartan  50 mg Oral Daily    metoprolol succinate  50 mg Oral Daily    polyethylene glycol  17 g Oral Daily    sertraline  100 mg Oral Daily    simvastatin  80 mg Oral QHS     Continuous Infusions:   As Needed: acetaminophen, acetaminophen, albuterol-ipratropium, bisacodyl, butalbital-acetaminophen-caffeine -40 mg, dextrose 50%, dextrose 50%, glucagon (human recombinant), glucose, glucose, hydrALAZINE, insulin aspart U-100, OLANZapine, ondansetron, ramelteon, sodium chloride 0.9%    Active Hospital Problems    Diagnosis  POA    *Acute metabolic encephalopathy [G93.41]  Yes    Chronic kidney disease, stage III (moderate) [N18.3]  Yes    Hyponatremia [E87.1]  Yes    Type 2 diabetes mellitus with hyperglycemia, with long-term current use of insulin [E11.65, Z79.4]  Not Applicable    CAD (coronary artery disease) [I25.10]  Yes     PTCA/stent 2010      Essential hypertension [I10]  Yes     1987      MS (multiple sclerosis) [G35]  Yes     1990      COPD (chronic obstructive pulmonary disease) with chronic bronchitis [J44.9]  Yes      Resolved Hospital Problems   No resolved problems to display.       Overview:    73  yo male on a background significant for MS, CAD, T2DM, HTN & COPD who is admitted for acute metabolic encephalopathy.     Assessment and Plan for Problems addressed today:    Acute metabolic encephalopathy  MS (multiple sclerosis)  · AMS likely multifactorial in setting of hyperglycemia, uncontrolled HTN and active MS.  · Received olanzapine x1 in ED. Continued home sertraline.   · Drug screen negative. UA & CXR unremarkable. 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 MRI: suggestive of active demyelination involving left caudate head.   · Consulting Neurology. (1/9)    Type 2 diabetes mellitus with hyperglycemia, with long-term current use of insulin  DMII with hypertension  · BG >400 on presentation. Received 1L LR & 26U detemir in ED.  · A1c (1/9): 10.5%. Home regimen: glipizide 5mg, linagliptin 5mg, metformin 1000mg; holding while admitted.    · Continue home medications: metoprolol succinate 50 mg daily, changed valsartan 160 mg to losartan 50 mg daily; hydralazine PRN.  · Initiated basal, prandial and low-dose correction insulin.  · Adjusting insulin doses. (1/9)     Chronic kidney disease, stage III (moderate)  · Stable. Baseline sCr: ~1.3.    CAD (coronary artery disease)  Hyperlipidemia   · EKG & troponin negative for ischemia   · Continued home clopidogrel and simvastatin.     COPD (chronic obstructive pulmonary disease) with chronic bronchitis  · Continued budesonide-formoterol inhaler and PRN Duonebs.     Hyponatremia, resolving  · In setting of hyperglycemia; improved with BG correction and IVF.      Diet: Diet diabetic [facility]; 2000 Calorie; Cardiac (Low Na/Chol)    DVT Prophylaxis:   Anticoagulants   Medication Route Frequency    heparin (porcine) injection 5,000 Units Subcutaneous Q8H       L/D/A:  PIV    Discharge plan and follow up  Home or Self Care      Provider  Sheryl Manuel MD  [facility] MED D   Department of Hospital Medicine    Scribe Attestation: I personally scribed for  Sheryl Manuel MD on 01/09/2019 at 9:29 AM. Electronically signed by sheng Valdez on 01/09/2019 at 9:29 AM.

## 2019-01-09 NOTE — PLAN OF CARE
01/09/19 1157   Discharge Assessment   Assessment Type Discharge Planning Assessment   Confirmed/corrected address and phone number on facesheet? No   Assessment information obtained from? Medical Record   Expected Length of Stay (days) 3   Communicated expected length of stay with patient/caregiver yes   Prior to hospitilization cognitive status: Alert/Oriented   Prior to hospitalization functional status: Independent   Current cognitive status: Not Oriented to Person;Not Oriented to Place;Not Oriented to Time   Current Functional Status: Needs Assistance   Lives With spouse   Is patient able to care for self after discharge? Unable to determine at this time (comments)   Patient's perception of discharge disposition home or selfcare   Readmission Within the Last 30 Days no previous admission in last 30 days   Equipment Currently Used at Home none   Discharge Plan A Home with family   Discharge Plan B Home Health   Sitter at bedside and patient in restraints. Patient is awake but does not participate in answering assessment questions. Per chart review patient lives with spouse and is independent with ADL's at baseline. CM/SW will follow for discharge needs.

## 2019-01-09 NOTE — CONSULTS
[facility]  Neurology  Consult Note    Patient Name: Vicente Luciano  MRN: [id number]  Admission Date: 1/8/2019  Hospital Length of Stay: 0 days  Code Status: Full Code   Attending Provider: Sheryl Manuel MD   Consulting Provider: Sho Sánchez PA-C  Primary Care Physician: MINGO Garvin MD  Principal Problem:Acute metabolic encephalopathy    Inpatient consult to Neurology  Consult performed by: Sho Sánchez PA-C  Consult ordered by: Sheryl Manuel MD         Subjective:     Chief Complaint:  Delirium, MS flare?     HPI:   73 y.o. Male with HTN, DMII, CAD with stents and MS presented to ED 1/8/19 for bizarre behavior and nonsensical speech that began around 6 pm. Per daughter, patient was talking to inanimate objects and acting aggressive, unlike his typical character. He was hugging a portal fan and had an episode of urinary incontinence. Per daughter, he was dx with MS in 1990s and has not driven for a long time. He uses a cane to ambulate, but otherwise is high functioning with ADLs, cooking/feeding, bathing/dressing himself. Normally manages his own medications with some assistance from wife. Yesterday he did not take some of his SQ insulin. Daughter says he has had uncontrolled glucose in the past, but did not act this way. Unsure of what his glucose typically runs. Patient unable to [facility]     [place] [facility] labs have been remarkable for uncontrolled HTN with SBP in 180s, hyperglycemia 465 and pseudohyponatremia (130>>134). He was given fluids and improved slightly, but still not at cognitive baseline. Glucose 547 on recheck and A1c 10.5. Code white was called since patient refused to stay in the bed and he was placed in 4 pt restraints and given prn zyprexa. Infectious workup unremarkable thus far with no leukocytosis, afebrile, CXR clear, UA negative, lactate normal. UDS negative and CTH without acute intracranial pathology. MRI  "brain W WO showed stable, diffuse plaques c/w moderate disease burden from MS and one new focus in the left caudate head consistent with active demyelination. Neurology consulted 1/9/19 for "delirium and active MS lesion."      Past Medical History:   Diagnosis Date    CAD (coronary artery disease) 2/4/2014    PTCA/stent 2010    CHRONIC BRONCHITIS     Diabetes mellitus, type 2 2001    DJD (degenerative joint disease) of lumbar spine 2/4/2014    HTN (hypertension), benign 1987    MS (multiple sclerosis) 1990     Past Surgical History:   Procedure Laterality Date    CARDIAC SURGERY      CORONARY STENT PLACEMENT      LUNG SURGERY       Review of patient's allergies indicates:   Allergen Reactions    Pcn [penicillins] Itching     No current facility-administered medications on file prior to encounter.      Current Outpatient Medications on File Prior to Encounter   Medication Sig    albuterol (ACCUNEB) 0.63 mg/3 mL Nebu Take 0.63 mg by nebulization every 6 (six) hours as needed.    budesonide-formoterol 160-4.5 mcg (SYMBICORT) 160-4.5 mcg/actuation HFAA Inhale 2 puffs into the lungs every 12 (twelve) hours.    butalbital-acetaminophen-caffeine -40 mg (FIORICET, ESGIC) -40 mg per tablet Take 1 tablet by mouth every 6 (six) hours as needed for Pain or Headaches.    cholecalciferol, vitamin D3, 5,000 unit Tab Take 5,000 Units by mouth once daily.    clopidogrel (PLAVIX) 75 mg tablet Take 75 mg by mouth once daily.    flunisolide 25 mcg, 0.025%, (NASALIDE) 25 mcg (0.025 %) Spry 2 sprays by Nasal route 2 (two) times daily.    glipiZIDE (GLUCOTROL) 5 MG tablet Take 5 mg by mouth 2 (two) times daily before meals.    metformin (GLUCOPHAGE) 1000 MG tablet Take 1,000 mg by mouth 2 (two) times daily with meals.    sertraline (ZOLOFT) 100 MG tablet Take 100 mg by mouth once daily.    simvastatin (ZOCOR) 80 MG tablet Take 80 mg by mouth every evening.    valsartan (DIOVAN) 160 MG tablet Take 160 mg " by mouth once daily.    alprazolam (XANAX) 1 MG tablet Take 1 mg by mouth 3 (three) times daily as needed.    interferon beta-1a (AVONEX) 30 mcg/0.5 mL injection Inject 0.5 mLs (30 mcg total) into the muscle every 7 days.    linagliptin (TRADJENTA) 5 mg Tab tablet Take 1 tablet (5 mg total) by mouth once daily.    loratadine (CLARITIN) 10 mg tablet Take 10 mg by mouth daily as needed.    metoprolol succinate (TOPROL-XL) 25 MG 24 hr tablet Take 2 tablets (50 mg total) by mouth once daily.    omeprazole (PRILOSEC) 20 MG capsule Take 20 mg by mouth daily as needed.    tramadol (ULTRAM) 50 mg tablet Take 50 mg by mouth every 6 (six) hours as needed for Pain.    trazodone (DESYREL) 50 MG tablet Take 1 tablet (50 mg total) by mouth nightly as needed for Insomnia.     Family History     None        Tobacco Use    Smoking status: Former Smoker     Packs/day: 2.00     Years: 22.00     Pack years: 44.00     Types: Cigarettes     Last attempt to quit: 1989     Years since quittin.3    Smokeless tobacco: Never Used    Tobacco comment: Quit smoking (2 pk/day)     Substance and Sexual Activity    Alcohol use: Yes     Comment: rare    Drug use: No    Sexual activity: Not on file     Review of Systems   Constitutional: Positive for activity change. Negative for fever.   HENT: Negative for trouble swallowing and voice change.    Eyes: Negative for visual disturbance.   Respiratory: Negative for shortness of breath.    Cardiovascular: Negative for chest pain.   Allergic/Immunologic: Positive for immunocompromised state.   Neurological: Positive for weakness. Negative for tremors, seizures and speech difficulty.   Psychiatric/Behavioral: Positive for agitation, behavioral problems and confusion.     Objective:     Vital Signs (Most Recent):  Temp: 97.1 °F (36.2 °C) (19)  Pulse: 89 (19)  Resp: 18 (19)  BP: (!) 153/83 (19)  SpO2: 96 % (19) Vital Signs  (24h Range):  Temp:  [97.1 °F (36.2 °C)] 97.1 °F (36.2 °C)  Pulse:  [] 89  Resp:  [18-20] 18  SpO2:  [96 %-99 %] 96 %  BP: (153-188)/() 153/83     Weight: 104.3 kg (230 lb)  Body mass index is 30.34 kg/m².    Physical Exam   Eyes: EOM are normal.   Psychiatric: His speech is normal.     NEUROLOGICAL EXAMINATION:     MENTAL STATUS   Oriented to person.   Disoriented to year. Oriented to month.   Follows 1 step (difficulty with multi-step commands) commands.   Attention: decreased. Concentration: decreased.   Speech: speech is normal   Level of consciousness: alert       Thinks he is at his house and able to state correct address. When told he is at a hospital, he is able to name [facility]      CRANIAL NERVES     CN III, IV, VI   Extraocular motions are normal.   Nystagmus: none     CN VII   Facial expression full, symmetric.     CN VIII   Hearing: intact    CN IX, X   Palate: symmetric    CN XII   CN XII normal.     MOTOR EXAM   Muscle bulk: normal  Overall muscle tone: normal    Strength   Right interossei: 5/5  Left interossei: 5/5  Right anterior tibial: 5/5  Left anterior tibial: 5/5  Right posterior tibial: 5/5  Left posterior tibial: 5/5       In 4 pt restraints      SENSORY EXAM   Light touch normal.     GAIT AND COORDINATION     Tremor   Resting tremor: absent    Significant Labs:   Hemoglobin A1c:   Recent Labs   Lab 01/08/19 2007 01/09/19  0529   HGBA1C 10.3* 10.5*     Blood Culture: No results for input(s): LABBLOO in the last 48 hours.  CBC:   Recent Labs   Lab 01/08/19 2007 01/09/19  0530   WBC 6.98 7.82   HGB 16.3 14.5   HCT 46.9 42.7    156     CMP:   Recent Labs   Lab 01/08/19 2022 01/09/19  0529   * 547*   * 134*   K 4.7 4.3   CL 97 99   CO2 24 25   BUN 16 15   CREATININE 1.5* 1.4   CALCIUM 9.2 9.1   MG 2.2 2.0   PROT 7.3  --    ALBUMIN 4.1  --    BILITOT 0.5  --    ALKPHOS 122  --    AST 17  --    ALT 30  --    ANIONGAP 9 10   EGFRNONAA 46.2* 50.2*     Inflammatory  Markers: No results for input(s): SEDRATE, CRP, PROCAL in the last 48 hours.  Urine Culture: No results for input(s): LABURIN in the last 48 hours.  Urine Studies:   Recent Labs   Lab 01/08/19 2054   COLORU Straw   APPEARANCEUA Clear   PHUR 6.0   SPECGRAV 1.025   PROTEINUA Negative   GLUCUA 3+*   KETONESU Negative   BILIRUBINUA Negative   OCCULTUA Negative   NITRITE Negative   LEUKOCYTESUR Negative   RBCUA 1   WBCUA 1   BACTERIA None     All pertinent lab results from the past 24 hours have been reviewed.    Significant Imaging: I have reviewed and interpreted all pertinent imaging results/findings within the past 24 hours.     MRI Brain W WO contrast (demyelinating protocol):  In this patient with a given history of multiple sclerosis there is a grossly stable appearance of diffuse demyelinating plaques, with a new focus of enhancement involving the left caudate head, suggesting a focus of active demyelination. No evidence of acute infarct or hemorrhage. Significantly limited examination on postcontrast images secondary to extensive motion.    Assessment and Plan:     * Acute metabolic encephalopathy    Bizarre behavior and nonsensical speech began evening of 1/8. Family reports only change was missed SQ insulin yesterday, but has not acted this way with previous episodes of hyperglycemia.   Since arrival to [facility], mental status has improved slightly, but still not at cognitive baseline and code shelli was called when he did not want to stay in the bed. He was placed in restraints and given zyprexa prn.     MRI with one new focus of active demyelination in left caudate head, which does not explain AMS. On Neuro exam, he is not fully oriented and has difficulty with multi-step commands.     Recommendations:  --B1, B2, B12, folate, TSH, RPR  --routine EEG   --delirium precautions with regulation of sleep/wake, limit sedating medications as able  --continue correction of metabolic derangements per primary team     MS  (multiple sclerosis)    Dx 1990s, [facility] and per primary team, on Avonex  Pt unable to name Neurologist and does not know when he was last seen    MRI with moderate disease burden and one new focus of active demyelination in left caudate head    --would hold off on IV Solumedrol at this time in setting of uncontrolled hyperglycemia and potential for steroid-induced psychosis      Hyponatremia    Pseudohyponatremia in setting of hyperglycemia  --management per primary team     Type 2 diabetes mellitus with hyperglycemia, with long-term current use of insulin    Management per primary team     Essential hypertension    Management per primary team       VTE Risk Mitigation (From admission, onward)        Ordered     heparin (porcine) injection 5,000 Units  Every 8 hours      01/09/19 0051     IP VTE HIGH RISK PATIENT  Once      01/09/19 0051        Thank you for your consult. I will follow-up with patient. Please contact us if you have any additional questions.    Sho Sánchez PA-C  General Neurology Consult  Neuro Consult [place] # [id number]

## 2019-01-09 NOTE — HPI
"73 y.o. Male with HTN, DMII, CAD with stents and MS presented to ED 1/8/19 for bizarre behavior and nonsensical speech that began around 6 pm. Per daughter, patient was talking to inanimate objects and acting aggressive, unlike his typical character. He was hugging a portal fan and had an episode of urinary incontinence. Per daughter, he was dx with MS in 1990s and has not driven for a long time. He uses a cane to ambulate, but otherwise is high functioning with ADLs, cooking/feeding, bathing/dressing himself. Normally manages his own medications with some assistance from wife. Yesterday he did not take some of his SQ insulin. Daughter says he has had uncontrolled glucose in the past, but did not act this way. Unsure of what his glucose typically runs. Patient unable to name Neurologist [facility].     Since arrival to [facility], labs have been remarkable for uncontrolled HTN with SBP in 180s, hyperglycemia 465 and pseudohyponatremia (130>>134). He was given fluids and improved slightly, but still not at cognitive baseline. Glucose 547 on recheck and A1c 10.5. Code white was called since patient refused to stay in the bed and he was placed in 4 pt restraints and given prn zyprexa. Infectious workup unremarkable thus far with no leukocytosis, afebrile, CXR clear, UA negative, lactate normal. UDS negative and CTH without acute intracranial pathology. MRI brain W WO showed stable, diffuse plaques c/w moderate disease burden from MS and one new focus in the left caudate head consistent with active demyelination. Neurology consulted 1/9/19 for "delirium and active MS lesion."         "

## 2019-01-09 NOTE — ASSESSMENT & PLAN NOTE
- hold oral hypoglycemics  - start weight based hospital insulin regimen: detemir 26U daily, aspart 8 U TIDWM, low dose SSI  - check A1c

## 2019-01-09 NOTE — SUBJECTIVE & OBJECTIVE
Past Medical History:   Diagnosis Date    CAD (coronary artery disease) 2/4/2014    PTCA/stent 2010    CHRONIC BRONCHITIS     Diabetes mellitus, type 2 2001    DJD (degenerative joint disease) of lumbar spine 2/4/2014    HTN (hypertension), benign 1987    MS (multiple sclerosis) 1990       Past Surgical History:   Procedure Laterality Date    CARDIAC SURGERY      CORONARY STENT PLACEMENT      LUNG SURGERY         Review of patient's allergies indicates:   Allergen Reactions    Pcn [penicillins] Itching       No current facility-administered medications on file prior to encounter.      Current Outpatient Medications on File Prior to Encounter   Medication Sig    albuterol (ACCUNEB) 0.63 mg/3 mL Nebu Take 0.63 mg by nebulization every 6 (six) hours as needed.    budesonide-formoterol 160-4.5 mcg (SYMBICORT) 160-4.5 mcg/actuation HFAA Inhale 2 puffs into the lungs every 12 (twelve) hours.    butalbital-acetaminophen-caffeine -40 mg (FIORICET, ESGIC) -40 mg per tablet Take 1 tablet by mouth every 6 (six) hours as needed for Pain or Headaches.    cholecalciferol, vitamin D3, 5,000 unit Tab Take 5,000 Units by mouth once daily.    clopidogrel (PLAVIX) 75 mg tablet Take 75 mg by mouth once daily.    flunisolide 25 mcg, 0.025%, (NASALIDE) 25 mcg (0.025 %) Spry 2 sprays by Nasal route 2 (two) times daily.    glipiZIDE (GLUCOTROL) 5 MG tablet Take 5 mg by mouth 2 (two) times daily before meals.    metformin (GLUCOPHAGE) 1000 MG tablet Take 1,000 mg by mouth 2 (two) times daily with meals.    sertraline (ZOLOFT) 100 MG tablet Take 100 mg by mouth once daily.    simvastatin (ZOCOR) 80 MG tablet Take 80 mg by mouth every evening.    valsartan (DIOVAN) 160 MG tablet Take 160 mg by mouth once daily.    alprazolam (XANAX) 1 MG tablet Take 1 mg by mouth 3 (three) times daily as needed.    interferon beta-1a (AVONEX) 30 mcg/0.5 mL injection Inject 0.5 mLs (30 mcg total) into the muscle every 7 days.     linagliptin (TRADJENTA) 5 mg Tab tablet Take 1 tablet (5 mg total) by mouth once daily.    loratadine (CLARITIN) 10 mg tablet Take 10 mg by mouth daily as needed.    metoprolol succinate (TOPROL-XL) 25 MG 24 hr tablet Take 2 tablets (50 mg total) by mouth once daily.    omeprazole (PRILOSEC) 20 MG capsule Take 20 mg by mouth daily as needed.    tramadol (ULTRAM) 50 mg tablet Take 50 mg by mouth every 6 (six) hours as needed for Pain.    trazodone (DESYREL) 50 MG tablet Take 1 tablet (50 mg total) by mouth nightly as needed for Insomnia.     Family History     None        Tobacco Use    Smoking status: Former Smoker     Packs/day: 2.00     Years: 22.00     Pack years: 44.00     Types: Cigarettes     Last attempt to quit: 1989     Years since quittin.3    Smokeless tobacco: Never Used    Tobacco comment: Quit smoking (2 pk/day) 1987   Substance and Sexual Activity    Alcohol use: Yes     Comment: rare    Drug use: No    Sexual activity: Not on file     Review of Systems   Constitutional: Negative for chills, fatigue and fever.   HENT: Negative for postnasal drip, sinus pain and sore throat.    Eyes: Negative for photophobia and visual disturbance.   Respiratory: Negative for cough, shortness of breath and wheezing.    Cardiovascular: Negative for chest pain, palpitations and leg swelling.   Gastrointestinal: Negative for abdominal distention, abdominal pain, diarrhea, nausea and vomiting.   Genitourinary: Negative for difficulty urinating, dysuria and enuresis.   Musculoskeletal: Negative for arthralgias, back pain and gait problem.   Skin: Negative for rash and wound.   Neurological: Positive for headaches. Negative for dizziness, tremors, seizures, weakness, light-headedness and numbness.   Psychiatric/Behavioral: Positive for agitation, behavioral problems and confusion.     Objective:     Vital Signs (Most Recent):  Temp: 97.1 °F (36.2 °C) (19)  Pulse: 92 (19  2257)  Resp: 20 (01/08/19 2257)  BP: (!) 188/86 (01/08/19 2257)  SpO2: 99 % (01/08/19 2257) Vital Signs (24h Range):  Temp:  [97.1 °F (36.2 °C)] 97.1 °F (36.2 °C)  Pulse:  [] 92  Resp:  [19-20] 20  SpO2:  [97 %-99 %] 99 %  BP: (162-188)/() 188/86     Weight: 104.3 kg (230 lb)  Body mass index is 30.34 kg/m².    Physical Exam   Constitutional: He is oriented to person, place, and time. He appears well-developed and well-nourished. No distress.   HENT:   Head: Normocephalic and atraumatic.   Eyes: EOM are normal.   Neck: Normal range of motion. Neck supple.   Cardiovascular: Normal rate and regular rhythm.   No murmur heard.  Pulmonary/Chest: Effort normal and breath sounds normal. No stridor. No respiratory distress.   Abdominal: Soft. Bowel sounds are normal. He exhibits no distension. There is no tenderness.   Musculoskeletal: Normal range of motion. He exhibits no edema or deformity.   No focal neuro findings, 5/5 strength throughout, able to position himself in bed independently, speech clear, AOx4, joking with family   Neurological: He is alert and oriented to person, place, and time. No cranial nerve deficit.   No focal neuro findings   Skin: Skin is warm and dry. No rash noted. He is not diaphoretic. No erythema.   Psychiatric: He has a normal mood and affect. His behavior is normal. Judgment and thought content normal.   Nursing note and vitals reviewed.        CRANIAL NERVES     CN III, IV, VI   Extraocular motions are normal.        Significant Labs:   CBC:   Recent Labs   Lab 01/08/19 2007   WBC 6.98   HGB 16.3   HCT 46.9        CMP:   Recent Labs   Lab 01/08/19 2022   *   K 4.7   CL 97   CO2 24   *   BUN 16   CREATININE 1.5*   CALCIUM 9.2   PROT 7.3   ALBUMIN 4.1   BILITOT 0.5   ALKPHOS 122   AST 17   ALT 30   ANIONGAP 9   EGFRNONAA 46.2*     Cardiac Markers:   Recent Labs   Lab 01/08/19 2022   BNP <10     Lactic Acid:   Recent Labs   Lab 01/08/19 2007   LACTATE 1.8      Troponin:   Recent Labs   Lab 01/08/19 2022   TROPONINI 0.007     TSH: No results for input(s): TSH in the last 4320 hours.  Urine Culture: No results for input(s): LABURIN in the last 48 hours.  Urine Studies:   Recent Labs   Lab 01/08/19 2054   COLORU Straw   APPEARANCEUA Clear   PHUR 6.0   SPECGRAV 1.025   PROTEINUA Negative   GLUCUA 3+*   KETONESU Negative   BILIRUBINUA Negative   OCCULTUA Negative   NITRITE Negative   LEUKOCYTESUR Negative   RBCUA 1   WBCUA 1   BACTERIA None       Significant Imaging: I have reviewed all pertinent imaging results/findings within the past 24 hours.

## 2019-01-09 NOTE — HPI
Vicente Luciano is a 73M with HTN, MS, CAD, T2DM, COPD who presents for evaluation of AMS. Per wife, around 1815 on 01/08 the patient had acute onset nonsensical speech. She reports his speech was clear, but he was acting aggressively and erratically - at one point talking to and hugging a fan. The patient is in charge of his own medications, and usually does just fine with them, however, today the patient didn't take his medications including his SQ insulin. Otherwise he was in his usual state of health, no recent falls. He was altered after having some ice cream and fig newtons. They deny any new medications, no EtOH or drug use, he has a Rx for xanax but doesn't take it, no changes on PO intake, changes in sleep, dysuria, facial droop, unilateral weakness, fever, abdominal pain, neck stiffness, vision changes, falls, CP, SOB. He endorses a HA. Since his presentation his though process has cleared up but the patient remains slightly off his baseline, but is about 50-75% there.    ED: labs unremarkable w/ exception of glucose of 465, SBP uncontrolled 180s, UA neg, UDS neg, CTH w/o acute changes, CXR clear, MRI pending.

## 2019-01-09 NOTE — SUBJECTIVE & OBJECTIVE
Past Medical History:   Diagnosis Date    CAD (coronary artery disease) 2/4/2014    PTCA/stent 2010    CHRONIC BRONCHITIS     Diabetes mellitus, type 2 2001    DJD (degenerative joint disease) of lumbar spine 2/4/2014    HTN (hypertension), benign 1987    MS (multiple sclerosis) 1990     Past Surgical History:   Procedure Laterality Date    CARDIAC SURGERY      CORONARY STENT PLACEMENT      LUNG SURGERY       Review of patient's allergies indicates:   Allergen Reactions    Pcn [penicillins] Itching     No current facility-administered medications on file prior to encounter.      Current Outpatient Medications on File Prior to Encounter   Medication Sig    albuterol (ACCUNEB) 0.63 mg/3 mL Nebu Take 0.63 mg by nebulization every 6 (six) hours as needed.    budesonide-formoterol 160-4.5 mcg (SYMBICORT) 160-4.5 mcg/actuation HFAA Inhale 2 puffs into the lungs every 12 (twelve) hours.    butalbital-acetaminophen-caffeine -40 mg (FIORICET, ESGIC) -40 mg per tablet Take 1 tablet by mouth every 6 (six) hours as needed for Pain or Headaches.    cholecalciferol, vitamin D3, 5,000 unit Tab Take 5,000 Units by mouth once daily.    clopidogrel (PLAVIX) 75 mg tablet Take 75 mg by mouth once daily.    flunisolide 25 mcg, 0.025%, (NASALIDE) 25 mcg (0.025 %) Spry 2 sprays by Nasal route 2 (two) times daily.    glipiZIDE (GLUCOTROL) 5 MG tablet Take 5 mg by mouth 2 (two) times daily before meals.    metformin (GLUCOPHAGE) 1000 MG tablet Take 1,000 mg by mouth 2 (two) times daily with meals.    sertraline (ZOLOFT) 100 MG tablet Take 100 mg by mouth once daily.    simvastatin (ZOCOR) 80 MG tablet Take 80 mg by mouth every evening.    valsartan (DIOVAN) 160 MG tablet Take 160 mg by mouth once daily.    alprazolam (XANAX) 1 MG tablet Take 1 mg by mouth 3 (three) times daily as needed.    interferon beta-1a (AVONEX) 30 mcg/0.5 mL injection Inject 0.5 mLs (30 mcg total) into the muscle every 7 days.     linagliptin (TRADJENTA) 5 mg Tab tablet Take 1 tablet (5 mg total) by mouth once daily.    loratadine (CLARITIN) 10 mg tablet Take 10 mg by mouth daily as needed.    metoprolol succinate (TOPROL-XL) 25 MG 24 hr tablet Take 2 tablets (50 mg total) by mouth once daily.    omeprazole (PRILOSEC) 20 MG capsule Take 20 mg by mouth daily as needed.    tramadol (ULTRAM) 50 mg tablet Take 50 mg by mouth every 6 (six) hours as needed for Pain.    trazodone (DESYREL) 50 MG tablet Take 1 tablet (50 mg total) by mouth nightly as needed for Insomnia.     Family History     None        Tobacco Use    Smoking status: Former Smoker     Packs/day: 2.00     Years: 22.00     Pack years: 44.00     Types: Cigarettes     Last attempt to quit: 1989     Years since quittin.3    Smokeless tobacco: Never Used    Tobacco comment: Quit smoking (2 pk/day) 1987   Substance and Sexual Activity    Alcohol use: Yes     Comment: rare    Drug use: No    Sexual activity: Not on file     Review of Systems   Constitutional: Positive for activity change. Negative for fever.   HENT: Negative for trouble swallowing and voice change.    Eyes: Negative for visual disturbance.   Respiratory: Negative for shortness of breath.    Cardiovascular: Negative for chest pain.   Allergic/Immunologic: Positive for immunocompromised state.   Neurological: Positive for weakness. Negative for tremors, seizures and speech difficulty.   Psychiatric/Behavioral: Positive for agitation, behavioral problems and confusion.     Objective:     Vital Signs (Most Recent):  Temp: 97.1 °F (36.2 °C) (19)  Pulse: 89 (19)  Resp: 18 (19)  BP: (!) 153/83 (19)  SpO2: 96 % (19) Vital Signs (24h Range):  Temp:  [97.1 °F (36.2 °C)] 97.1 °F (36.2 °C)  Pulse:  [] 89  Resp:  [18-20] 18  SpO2:  [96 %-99 %] 96 %  BP: (153-188)/() 153/83     Weight: 104.3 kg (230 lb)  Body mass index is 30.34 kg/m².    Physical  Exam   Eyes: EOM are normal.   Psychiatric: His speech is normal.     NEUROLOGICAL EXAMINATION:     MENTAL STATUS   Oriented to person.   Disoriented to year. Oriented to month.   Follows 1 step (difficulty with multi-step commands) commands.   Attention: decreased. Concentration: decreased.   Speech: speech is normal   Level of consciousness: alert       Thinks he is at his house and able to state correct address. When told he is at a hospital, he is able to name [facility]      CRANIAL NERVES     CN III, IV, VI   Extraocular motions are normal.   Nystagmus: none     CN VII   Facial expression full, symmetric.     CN VIII   Hearing: intact    CN IX, X   Palate: symmetric    CN XII   CN XII normal.     MOTOR EXAM   Muscle bulk: normal  Overall muscle tone: normal    Strength   Right interossei: 5/5  Left interossei: 5/5  Right anterior tibial: 5/5  Left anterior tibial: 5/5  Right posterior tibial: 5/5  Left posterior tibial: 5/5       In 4 pt restraints      SENSORY EXAM   Light touch normal.     GAIT AND COORDINATION     Tremor   Resting tremor: absent    Significant Labs:   Hemoglobin A1c:   Recent Labs   Lab 01/08/19 2007 01/09/19  0529   HGBA1C 10.3* 10.5*     Blood Culture: No results for input(s): LABBLOO in the last 48 hours.  CBC:   Recent Labs   Lab 01/08/19 2007 01/09/19  0530   WBC 6.98 7.82   HGB 16.3 14.5   HCT 46.9 42.7    156     CMP:   Recent Labs   Lab 01/08/19 2022 01/09/19  0529   * 547*   * 134*   K 4.7 4.3   CL 97 99   CO2 24 25   BUN 16 15   CREATININE 1.5* 1.4   CALCIUM 9.2 9.1   MG 2.2 2.0   PROT 7.3  --    ALBUMIN 4.1  --    BILITOT 0.5  --    ALKPHOS 122  --    AST 17  --    ALT 30  --    ANIONGAP 9 10   EGFRNONAA 46.2* 50.2*     Inflammatory Markers: No results for input(s): SEDRATE, CRP, PROCAL in the last 48 hours.  Urine Culture: No results for input(s): LABURIN in the last 48 hours.  Urine Studies:   Recent Labs   Lab 01/08/19 2054   COLORU Straw   APPEARANCEUA  Clear   PHUR 6.0   SPECGRAV 1.025   PROTEINUA Negative   GLUCUA 3+*   KETONESU Negative   BILIRUBINUA Negative   OCCULTUA Negative   NITRITE Negative   LEUKOCYTESUR Negative   RBCUA 1   WBCUA 1   BACTERIA None     All pertinent lab results from the past 24 hours have been reviewed.    Significant Imaging: I have reviewed and interpreted all pertinent imaging results/findings within the past 24 hours.     MRI Brain W WO contrast (demyelinating protocol):  In this patient with a given history of multiple sclerosis there is a grossly stable appearance of diffuse demyelinating plaques, with a new focus of enhancement involving the left caudate head, suggesting a focus of active demyelination. No evidence of acute infarct or hemorrhage. Significantly limited examination on postcontrast images secondary to extensive motion.

## 2019-01-09 NOTE — ED PROVIDER NOTES
"Encounter Date: 2019    SCRIBE #1 NOTE: I, Mariano Charles, am scribing for, and in the presence of,  Dr. Kimbrough. I have scribed the following portions of the note - the Resident attestation.       History     Chief Complaint   Patient presents with    Altered Mental Status     Pt brought to ED via [facility]. Wife states pt started to have AMS @ 1815. Per EMS pt with garbled speech.      74 y/o M PMH CAD s/p stents in , DM2, HTN and MS who presents with AMS. Per the patients wife he became confused around 6pm with "weird speech." She denies any inciting incident such as fall or intoxication. He only takes his medication as prescribed however, she has difficulty getting him to take his insulin. He has been following commands but not speaking clearly which concerned his wife. He has not been ill recently except for a nonproductive cough. Wife denies fever, chills, change in bowel or bladder movement. He is moving all extremities and is following commands on presentation.           Review of patient's allergies indicates:   Allergen Reactions    Pcn [penicillins] Itching     Past Medical History:   Diagnosis Date    CAD (coronary artery disease) 2014    PTCA/stent     CHRONIC BRONCHITIS     Diabetes mellitus, type 2     DJD (degenerative joint disease) of lumbar spine 2014    HTN (hypertension), benign     MS (multiple sclerosis)      Past Surgical History:   Procedure Laterality Date    CARDIAC SURGERY      CORONARY STENT PLACEMENT      LUNG SURGERY       History reviewed. No pertinent family history.  Social History     Tobacco Use    Smoking status: Former Smoker     Packs/day: 2.00     Years: 22.00     Pack years: 44.00     Types: Cigarettes     Last attempt to quit: 1989     Years since quittin.3    Smokeless tobacco: Never Used    Tobacco comment: Quit smoking (2 pk/day)     Substance Use Topics    Alcohol use: Yes     Comment: rare    Drug use: No "     Review of Systems   Constitutional: Positive for activity change.   HENT: Negative.    Eyes: Negative.    Respiratory: Positive for cough.    Cardiovascular: Negative for chest pain, palpitations and leg swelling.   Gastrointestinal: Negative.    Endocrine: Negative.    Skin: Negative.    Allergic/Immunologic: Negative.    Neurological: Positive for speech difficulty. Negative for facial asymmetry and headaches.   Psychiatric/Behavioral: Positive for confusion.       Physical Exam     Initial Vitals [01/08/19 1912]   BP Pulse Resp Temp SpO2   (!) 162/104 108 20 97.1 °F (36.2 °C) 97 %      MAP       --         Physical Exam    HENT:   Head: Normocephalic and atraumatic.   Eyes: EOM are normal. Pupils are equal, round, and reactive to light.   Neck: Normal range of motion. Neck supple.   Cardiovascular: Normal rate and regular rhythm.   Pulmonary/Chest: Breath sounds normal. He has no wheezes.   Abdominal: Soft. Bowel sounds are normal. He exhibits no distension. There is no tenderness.   Musculoskeletal: Normal range of motion.   Neurological: He is alert. He has normal strength and normal reflexes. He displays normal reflexes. No cranial nerve deficit or sensory deficit.   Oriented to self, city   Skin: Skin is dry.         ED Course   Procedures  Labs Reviewed   CBC W/ AUTO DIFFERENTIAL - Abnormal; Notable for the following components:       Result Value    Platelet Estimate Clumped (*)     All other components within normal limits   URINALYSIS - Abnormal; Notable for the following components:    Glucose, UA 3+ (*)     All other components within normal limits    Narrative:     yellow & gray tubes   COMPREHENSIVE METABOLIC PANEL - Abnormal; Notable for the following components:    Sodium 130 (*)     Glucose 465 (*)     Creatinine 1.5 (*)     eGFR if  53.4 (*)     eGFR if non  46.2 (*)     All other components within normal limits   HEMOGLOBIN A1C - Abnormal; Notable for the  following components:    Hemoglobin A1C 10.3 (*)     Estimated Avg Glucose 249 (*)     All other components within normal limits    Narrative:     [id number]  Add on GHGB per Sheryl Manuel MD @ 01:50  01/09/2019    POCT GLUCOSE - Abnormal; Notable for the following components:    POCT Glucose 409 (*)     All other components within normal limits   POCT GLUCOSE - Abnormal; Notable for the following components:    POCT Glucose 373 (*)     All other components within normal limits   POCT GLUCOSE - Abnormal; Notable for the following components:    POCT Glucose 263 (*)     All other components within normal limits   LACTIC ACID, PLASMA   TOXICOLOGY SCREEN, URINE, RANDOM (COMPLIANCE)    Narrative:     yellow & gray tubes   B-TYPE NATRIURETIC PEPTIDE   DRUG SCREEN PANEL, URINE EMERGENCY   LIPASE   MAGNESIUM   PROTIME-INR   TROPONIN I   URINALYSIS MICROSCOPIC    Narrative:     yellow & gray tubes   PROTIME-INR    Narrative:     ADD-ON PT-INR #[id number] PER BENITO CRAMER, DO 21:21  01/08/2019   ADD-ON BNP #[id number] PER BENITO CRAMER, DO 21:26 01/08/2019   ADD-ON MG #[id number]; LIPAS #[id number]; TROP #[id number] PER BENITO CRAMER, DO 21:31  01/08/2019    B-TYPE NATRIURETIC PEPTIDE    Narrative:     ADD-ON PT-INR #[id number] PER BENITO CRAMER, DO 21:21  01/08/2019   ADD-ON BNP #[id number] PER BENITO CRAMER, DO 21:26  01/08/2019   ADD-ON MG #[id number]; LIPAS #[id number]; TROP #[id number] PER BENITO CRAMER, DO 21:31  01/08/2019    MAGNESIUM    Narrative:     ADD-ON PT-INR #[id number] PER BENITO REESAN, DO 21:21  01/08/2019   ADD-ON BNP #[id number] PER BENITO REESAN, DO 21:26 01/08/2019   ADD-ON MG #[id number]; LIPAS #[id number]; TROP #[id number] PER BENITO CRAMER, DO 21:31  01/08/2019    LIPASE    Narrative:     ADD-ON PT-INR #[id number] PER BENITO CRAMER, DO 21:21  01/08/2019   ADD-ON BNP #[id number] PER BENITO CRAMER DO 21:26  01/08/2019   ADD-ON MG #[id number]; LIPAS #[id number]; TROP #[id number] PER BENITO CRAMER DO  21:31  01/08/2019    TROPONIN I    Narrative:     ADD-ON PT-INR #[id number] PER BENITO CRAMER, DO 21:21  01/08/2019   ADD-ON BNP #[id number] PER BENITO CRAMER, DO 21:26  01/08/2019   ADD-ON MG #[id number]; LIPAS #[id number]; TROP #[id number] PER BENITO CRAMER, DO 21:31  01/08/2019    HEMOGLOBIN A1C     EKG Readings: (Independently Interpreted)   Initial Reading: No STEMI. Rhythm: Normal Sinus Rhythm.     ECG Results          EKG 12-lead (Final result)  Result time 01/09/19 17:35:46    Final result by Interface, Lab [facility] (01/09/19 17:35:46)                 Narrative:    Test Reason : (Not Selected)  Blood Pressure : 128/058 mmHG  Vent. Rate : 098 BPM     Atrial Rate : 098 BPM     P-R Int : 178 ms          QRS Dur : 088 ms      QT Int : 344 ms       P-R-T Axes : 060 -44 031 degrees     QTc Int : 439 ms    Normal sinus rhythm  Left axis deviation  LVH  Abnormal ECG  When compared with ECG of 30-JAN-2018 02:10,  No significant change was found  Confirmed by MD Neftali, Efrain ([id number]) on 1/9/2019 5:35:36 PM    Referred By: [email or web address]   SELF           Confirmed By:Efrain Lindsay MD                             EKG 12-LEAD (Final result)  Result time 01/18/19 07:56:14              Imaging Results          MRI Brain Demyelinating W W/O Contrast (Final result)  Result time 01/09/19 04:03:32   Procedure changed from MRI Brain Without Contrast     Final result by Ronal Soler MD (01/09/19 04:03:32)                 Impression:      In this patient with a given history of multiple sclerosis there is a grossly stable appearance of diffuse demyelinating plaques, with a new focus of enhancement involving the left caudate head, suggesting a focus of active demyelination.    No evidence of acute infarct or hemorrhage.    Significantly limited examination on postcontrast images secondary to extensive motion.    Electronically signed by resident: Zka Wang  Date:    01/09/2019  Time:    01:37    Electronically signed by: Ronal  MD Karlene  Date:    01/09/2019  Time:    04:03             Narrative:    EXAMINATION:  MRI BRAIN DEMYELINATING W/ WO CONTRAST    CLINICAL HISTORY:  Multiple sclerosis, new neurological event    TECHNIQUE:  Multiplanar multisequence MR imaging of the brain was performed before and after the administration of 10 mL Gadavist intravenous contrast.    COMPARISON:  MRI brain 06/13/2016    FINDINGS:  Examination is limited secondary to motion artifact during contrast enhanced images.    Intracranial Compartment:    Within the cerebral white matter, there are innumerable focal and conglomerate areas of T2/FLAIR signal hyperintensity, largely situated in the periventricular zones along the margins of the corpus callosum and lateral ventricles radiating outward. Lesions involve the deep, peripheral, and subcortical supratentorial white matter in addition to the juxtacortical white matter.  There is a single focus of increased T2/FLAIR signal within the left cerebellum.  Overall plaque burden is unchanged from the prior study.    There is a single new ring-enhancing enhancing lesion involving the left caudate head measuring approximately 0.5 cm.  This finding is concerning for active disease.    There is overall stable white matter volume loss.    There is no restricted diffusion to suggest acute large territory infarction.  No evidence of acute hemorrhage.    Skull/Extracranial Contents (limited evaluation): Bone marrow signal intensity is normal.                               CT Head Without Contrast (Final result)  Result time 01/08/19 21:44:35    Final result by Tomer Whitten MD (01/08/19 21:44:35)                 Impression:      Stable prominence of the ventricular system, which appears more distended than expected for the amount of cerebral volume loss and sulcation.  Underlying NPH cannot be excluded.    Extensive white matter changes, suggestive of significant chronic microvascular ischemic change.  MRI may be  "obtained for further evaluation.    Electronically signed by resident: Zak Wang  Date:    01/08/2019  Time:    20:58    Electronically signed by: Tomer Whitten MD  Date:    01/08/2019  Time:    21:44             Narrative:    EXAMINATION:  CT HEAD WITHOUT CONTRAST    CLINICAL HISTORY:  Confusion/delirium, altered LOC, unexplained    TECHNIQUE:  Low dose axial CT images obtained throughout the head without intravenous contrast. Sagittal and coronal reconstructions were performed.    COMPARISON:  MRI brain 06/13/2016.    FINDINGS:  Intracranial compartment:    The ventricular system is stable in configuration, with prominence greater than expected for amount of cerebral volume loss and sulcation.  No extra-axial blood or fluid collections.    There is a significant amount of decreased attenuation within the supratentorial white matter, likely representing severe chronic microvascular ischemic change.  No parenchymal mass, hemorrhage, edema or major vascular distribution infarct.    Skull/extracranial contents (limited evaluation): No fracture. Mastoid air cells and paranasal sinuses are essentially clear.                               X-Ray Chest 1 View (Final result)  Result time 01/08/19 21:06:58    Final result by Ronal Soler MD (01/08/19 21:06:58)                 Impression:      No acute cardiopulmonary finding.      Electronically signed by: Ronal Soler MD  Date:    01/08/2019  Time:    21:06             Narrative:    EXAMINATION:  XR CHEST 1 VIEW    CLINICAL HISTORY:  Provided history is "altered;  ".    TECHNIQUE:  One view of the chest.    COMPARISON:  01/30/2018.    FINDINGS:  Cardiac wires overlie the chest.  Cardiac silhouette is magnified by technique but not felt to be enlarged.  No focal consolidation.  No sizable effusion.  No pneumothorax.  No detrimental change.                                 Medical Decision Making:   History:   Old Medical Records: I decided to obtain old medical " records.  Initial Assessment:   72 y/o M PMH CAD s/p stents in 2010, DM2, HTN and MS who presents with AMS with no concerning signs for CVA.   Differential Diagnosis:   Hyperglycemia/DKA  Electrolyte abnormality  CVA  Independently Interpreted Test(s):   I have ordered and independently interpreted X-rays - see prior notes.  I have ordered and independently interpreted EKG Reading(s) - see prior notes  Clinical Tests:   Lab Tests: Ordered and Reviewed  Radiological Study: Ordered and Reviewed  Medical Tests: Ordered and Reviewed  ED Management:  CMP, CBC wnl but glucose 465, started fluids and will recheck BGL in approx. 1 hour from now (2116)  -Repeat  after 1 L LR, will admin 4u insulin and give another liter of fluids  Head CT NPH but largely unremarkable; MRI Head ordered   BNP, Trops wnl  Utox negative  CXR unremarkable  EKG unchanged from previous    Given improving but ongoing confusion, will admit to obs overnight.            Scribe Attestation:   Scribe #1: I performed the above scribed service and the documentation accurately describes the services I performed. I attest to the accuracy of the note.    Attending Attestation:   Physician Attestation Statement for Resident:  As the supervising MD   Physician Attestation Statement: I have personally seen and examined this patient.   I agree with the above history. -:      As the supervising MD I agree with the above PE.    As the supervising MD I agree with the above treatment, course, plan, and disposition.            I have reviewed and concur with the resident's history, physical, assessment, and plan.  I have personally interviewed and examined the patient at bedside.  See below addendum for my evaluation and additional findings.    Briefly,  this is a 73 y.o.male with a history of MS, hypertension, CAD, diabetes and chronic kidney disease presenting with altered mental status.  CT and MRI imaging negative for acute stroke or intracranial process.   Patient has a history of MS, with no new lesions seen.  Profoundly hyperglycemic, with likely acute metabolic encephalopathy in the setting of hyperglycemia and hypertension.  On repeat evaluation, patient continues to be altered with some improvement.  Will admit to observation for ongoing monitoring and management and to ensure resolution.    Impression:  Metabolic encephalopathy, diabetes with hyperglycemia      Obi Kimbrough DO    Dept of Emergency Medicine   [facility]  Spectralink: [id number]             Clinical Impression:   The primary encounter diagnosis was Acute metabolic encephalopathy. Diagnoses of Altered mental state, MS (multiple sclerosis), Essential hypertension, Coronary artery disease involving native coronary artery of native heart with other form of angina pectoris, Type 2 diabetes mellitus with hyperglycemia, with long-term current use of insulin, and Chronic kidney disease, stage III (moderate) were also pertinent to this visit.      Disposition:   Disposition: Placed in Observation  Condition: Stable                        Kiera Mac MD  Resident  01/09/19 0027       Obi Kimbrough DO  01/20/19 [id number]

## 2019-01-09 NOTE — ASSESSMENT & PLAN NOTE
Bizarre behavior and nonsensical speech began evening of 1/8. Family reports only change was missed SQ insulin yesterday, but has not acted this way with previous episodes of hyperglycemia.   Since arrival to [facility], mental status has improved slightly, but still not at cognitive baseline and code white was called when he did not want to stay in the bed. He was placed in restraints and given zyprexa prn.     MRI with one new focus of active demyelination in left caudate head, which does not explain AMS. On Neuro exam, he is not fully oriented and has difficulty with multi-step commands.     Recommendations:  --B1, B2, B12, folate, TSH, RPR  --routine EEG   --delirium precautions with regulation of sleep/wake, limit sedating medications as able  --continue correction of metabolic derangements per primary team

## 2019-01-09 NOTE — ASSESSMENT & PLAN NOTE
- continue home medications: metoprolol succinate 50 mg daily, change valsartan 160 mg to losartan 50 mg daily  - uncontrolled on admit  - hydralazine PRN

## 2019-01-09 NOTE — ED NOTES
Patient identifiers for Vicente Luciano 73 y.o. male checked and correct.  Chief Complaint   Patient presents with    Altered Mental Status     Pt brought to ED via [facility]. Wife states pt started to have AMS @ 1815. Per EMS pt with garbled speech.      Past Medical History:   Diagnosis Date    CAD (coronary artery disease) 2/4/2014    PTCA/stent 2010    CHRONIC BRONCHITIS     Diabetes mellitus, type 2 2001    DJD (degenerative joint disease) of lumbar spine 2/4/2014    HTN (hypertension), benign 1987    MS (multiple sclerosis) 1990     Allergies reported:   Review of patient's allergies indicates:   Allergen Reactions    Pcn [penicillins] Itching         LOC: Patient is awake, oriented to name, confused to time and place, visual hallucinations noted, follows simple commands, speech normal.   APPEARANCE: Patient resting comfortably and in no acute distress. Patient is clean and well groomed, patient's clothing is properly fastened.  HEENT: WNL  SKIN: The skin is warm and dry. Patient has normal skin turgor and moist mucus membranes. Skin is intact; no bruising or breakdown noted.  MUSKULOSKELETAL: Patient is moving all extremities well, no obvious deformities noted. Pulses intact.   RESPIRATORY: Airway is open and patent. Respirations are spontaneous and non-labored with normal effort and rate, BBS=clear  CARDIAC: Patient has a normal rate and rhythm.  SR on cardiac monitor,No peripheral edema noted.   ABDOMEN: No distention noted. Bowel sounds active in all 4 quadrants. Soft and non-tender upon palpation.  NEUROLOGICAL: pupils 4 mm, PERRL. Facial expression is symmetrical. Hand grasps are equal bilaterally. Normal sensation in all extremities when touched with finger.

## 2019-01-09 NOTE — NURSING
Code shelli was called d/t patient refusing to lie in bed and listen to team. Ordered obtained for restraints. Zyprexa, IV bolus, levemir, and novalog given.

## 2019-01-09 NOTE — ASSESSMENT & PLAN NOTE
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

## 2019-01-10 LAB
ALBUMIN SERPL BCP-MCNC: 3.7 G/DL
ANION GAP SERPL CALC-SCNC: 12 MMOL/L
BASOPHILS # BLD AUTO: 0.04 K/UL
BASOPHILS NFR BLD: 0.4 %
BUN SERPL-MCNC: 24 MG/DL
CALCIUM SERPL-MCNC: 9.6 MG/DL
CHLORIDE SERPL-SCNC: 105 MMOL/L
CO2 SERPL-SCNC: 20 MMOL/L
CREAT SERPL-MCNC: 1.5 MG/DL
DIFFERENTIAL METHOD: NORMAL
EOSINOPHIL # BLD AUTO: 0 K/UL
EOSINOPHIL NFR BLD: 0.2 %
ERYTHROCYTE [DISTWIDTH] IN BLOOD BY AUTOMATED COUNT: 12.9 %
EST. GFR  (AFRICAN AMERICAN): 53.4 ML/MIN/1.73 M^2
EST. GFR  (NON AFRICAN AMERICAN): 46.2 ML/MIN/1.73 M^2
FOLATE SERPL-MCNC: 11.2 NG/ML
GLUCOSE SERPL-MCNC: 169 MG/DL
HCT VFR BLD AUTO: 45.6 %
HGB BLD-MCNC: 15.7 G/DL
IMM GRANULOCYTES # BLD AUTO: 0.03 K/UL
IMM GRANULOCYTES NFR BLD AUTO: 0.3 %
LYMPHOCYTES # BLD AUTO: 2.8 K/UL
LYMPHOCYTES NFR BLD: 28.6 %
MAGNESIUM SERPL-MCNC: 2 MG/DL
MCH RBC QN AUTO: 29.7 PG
MCHC RBC AUTO-ENTMCNC: 34.4 G/DL
MCV RBC AUTO: 86 FL
MONOCYTES # BLD AUTO: 0.7 K/UL
MONOCYTES NFR BLD: 7.5 %
NEUTROPHILS # BLD AUTO: 6.2 K/UL
NEUTROPHILS NFR BLD: 63 %
NRBC BLD-RTO: 0 /100 WBC
PHOSPHATE SERPL-MCNC: 4.2 MG/DL
PLATELET # BLD AUTO: 185 K/UL
PMV BLD AUTO: 10.9 FL
POCT GLUCOSE: 107 MG/DL (ref 70–110)
POCT GLUCOSE: 107 MG/DL (ref 70–110)
POCT GLUCOSE: 147 MG/DL (ref 70–110)
POCT GLUCOSE: 161 MG/DL (ref 70–110)
POCT GLUCOSE: 164 MG/DL (ref 70–110)
POCT GLUCOSE: 222 MG/DL (ref 70–110)
POCT GLUCOSE: >500 MG/DL (ref 70–110)
POCT GLUCOSE: >500 MG/DL (ref 70–110)
POTASSIUM SERPL-SCNC: 4 MMOL/L
RBC # BLD AUTO: 5.29 M/UL
SODIUM SERPL-SCNC: 137 MMOL/L
TSH SERPL DL<=0.005 MIU/L-ACNC: 0.77 UIU/ML
VIT B12 SERPL-MCNC: 453 PG/ML
WBC # BLD AUTO: 9.89 K/UL

## 2019-01-10 PROCEDURE — 84252 ASSAY OF VITAMIN B-2: CPT

## 2019-01-10 PROCEDURE — 25000003 PHARM REV CODE 250: Performed by: PHYSICIAN ASSISTANT

## 2019-01-10 PROCEDURE — 83735 ASSAY OF MAGNESIUM: CPT

## 2019-01-10 PROCEDURE — 84425 ASSAY OF VITAMIN B-1: CPT

## 2019-01-10 PROCEDURE — 82746 ASSAY OF FOLIC ACID SERUM: CPT

## 2019-01-10 PROCEDURE — 36415 COLL VENOUS BLD VENIPUNCTURE: CPT

## 2019-01-10 PROCEDURE — 63600175 PHARM REV CODE 636 W HCPCS: Performed by: PHYSICIAN ASSISTANT

## 2019-01-10 PROCEDURE — 82607 VITAMIN B-12: CPT

## 2019-01-10 PROCEDURE — 99225 PR SUBSEQUENT OBSERVATION CARE,LEVEL II: CPT | Mod: ,,, | Performed by: INTERNAL MEDICINE

## 2019-01-10 PROCEDURE — 85025 COMPLETE CBC W/AUTO DIFF WBC: CPT

## 2019-01-10 PROCEDURE — 95816 EEG AWAKE AND DROWSY: CPT | Mod: 26,,, | Performed by: PSYCHIATRY & NEUROLOGY

## 2019-01-10 PROCEDURE — 95816 EEG AWAKE AND DROWSY: CPT

## 2019-01-10 PROCEDURE — 86592 SYPHILIS TEST NON-TREP QUAL: CPT

## 2019-01-10 PROCEDURE — 99214 PR OFFICE/OUTPT VISIT, EST, LEVL IV, 30-39 MIN: ICD-10-PCS | Mod: GC,,, | Performed by: PSYCHIATRY & NEUROLOGY

## 2019-01-10 PROCEDURE — 84443 ASSAY THYROID STIM HORMONE: CPT

## 2019-01-10 PROCEDURE — 99214 OFFICE O/P EST MOD 30 MIN: CPT | Mod: GC,,, | Performed by: PSYCHIATRY & NEUROLOGY

## 2019-01-10 PROCEDURE — 99225 PR SUBSEQUENT OBSERVATION CARE,LEVEL II: ICD-10-PCS | Mod: ,,, | Performed by: INTERNAL MEDICINE

## 2019-01-10 PROCEDURE — 80069 RENAL FUNCTION PANEL: CPT

## 2019-01-10 PROCEDURE — G0378 HOSPITAL OBSERVATION PER HR: HCPCS

## 2019-01-10 PROCEDURE — 95816 PR EEG,W/AWAKE & DROWSY RECORD: ICD-10-PCS | Mod: 26,,, | Performed by: PSYCHIATRY & NEUROLOGY

## 2019-01-10 RX ADMIN — INSULIN DETEMIR 30 UNITS: 100 INJECTION, SOLUTION SUBCUTANEOUS at 09:01

## 2019-01-10 RX ADMIN — LOSARTAN POTASSIUM 50 MG: 50 TABLET, FILM COATED ORAL at 09:01

## 2019-01-10 RX ADMIN — INSULIN ASPART 10 UNITS: 100 INJECTION, SOLUTION INTRAVENOUS; SUBCUTANEOUS at 09:01

## 2019-01-10 RX ADMIN — INSULIN ASPART 10 UNITS: 100 INJECTION, SOLUTION INTRAVENOUS; SUBCUTANEOUS at 06:01

## 2019-01-10 RX ADMIN — ACETAMINOPHEN 1000 MG: 500 TABLET ORAL at 01:01

## 2019-01-10 RX ADMIN — INSULIN ASPART 10 UNITS: 100 INJECTION, SOLUTION INTRAVENOUS; SUBCUTANEOUS at 12:01

## 2019-01-10 RX ADMIN — HEPARIN SODIUM 5000 UNITS: 5000 INJECTION, SOLUTION INTRAVENOUS; SUBCUTANEOUS at 06:01

## 2019-01-10 RX ADMIN — ACETAMINOPHEN 650 MG: 325 TABLET ORAL at 01:01

## 2019-01-10 RX ADMIN — HEPARIN SODIUM 5000 UNITS: 5000 INJECTION, SOLUTION INTRAVENOUS; SUBCUTANEOUS at 01:01

## 2019-01-10 RX ADMIN — CLOPIDOGREL 75 MG: 75 TABLET, FILM COATED ORAL at 09:01

## 2019-01-10 RX ADMIN — METOPROLOL SUCCINATE 50 MG: 50 TABLET, EXTENDED RELEASE ORAL at 09:01

## 2019-01-10 RX ADMIN — HEPARIN SODIUM 5000 UNITS: 5000 INJECTION, SOLUTION INTRAVENOUS; SUBCUTANEOUS at 09:01

## 2019-01-10 RX ADMIN — SERTRALINE 100 MG: 100 TABLET, FILM COATED ORAL at 09:01

## 2019-01-10 RX ADMIN — INSULIN ASPART 2 UNITS: 100 INJECTION, SOLUTION INTRAVENOUS; SUBCUTANEOUS at 06:01

## 2019-01-10 RX ADMIN — POLYETHYLENE GLYCOL 3350 17 G: 17 POWDER, FOR SOLUTION ORAL at 09:01

## 2019-01-10 NOTE — PROCEDURES
DATE OF SERVICE:  01/10/2019    DURATION:  26 minutes and 26 seconds.    ELECTROENCEPHALOGRAM REPORT    METHODOLOGY:  Electroencephalographic (EEG) recording is recorded with   electrodes placed according to the International 10-20 placement system.  Thirty   two (32) channels of digital signal (sampling rate of 512/sec), including T1   and T2, were simultaneously recorded from the scalp and may include EKG, EMG,   and/or eye monitors.  Recording band pass was 0.1 to 512 Hz.  Digital video   recording of the patient is simultaneously recorded with the EEG.  The patient   is instructed to report clinical symptoms which may occur during the recording   session.  EEG and video recording are stored and archived in digital format.    Activation procedures, which include photic stimulation, hyperventilation and   instructing patients to perform simple tasks, are done in selected patients.    The EEG is displayed on a monitor screen and can be reviewed using different   montages.  Computer assisted-analysis is employed to detect spike and   electrographic seizure activity.  The entire record is submitted for computer   analysis.  The entire recording is visually reviewed, and the times identified   by computer analysis as being spikes or seizures are reviewed again.    Compressed spectral analysis (CSA) is also performed on the activity recorded   from each individual channel.  This is displayed as a power display of   frequencies from 0 to 30 Hz over time.  The CSA is reviewed looking for   asymmetries in power between homologous areas of the scalp, then compared with   the original EEG recording.    Trulio[employer] software was also utilized in the review of this study.  This software   suite analyzes the EEG recording in multiple domains.  Coherence and rhythmicity   are computed to identify EEG sections which may contain organized seizures.    Each channel undergoes analysis to detect the presence of spike and sharp waves    which have special and morphological characteristics of epileptic activity.  The   routine EEG recording is converted from special into frequency domain.  This is   then displayed comparing homologous areas to identify areas of significant   asymmetry.  Algorithm to identify non-cortically generated artifact is used to   separate artifact from the EEG.    EEG FINDINGS:  This record is relatively low amplitude and consists   predominantly of diffusely slow generalized frequencies in the low amplitude   delta range at 2 to 4 Hz with superimposed theta frequencies and occasional beta   frequencies seen as well.  Eye blink artifact and EMG artifact are present at   times.  There are high amplitude delta waves seen semi-rhythmically at times for   brief periods lasting up to 2 to 3 seconds.  For the most part, the record   changes very little from beginning to end and no normal waking or sleeping   rhythms were seen.  The record is symmetrical without epileptiform discharges or   seizures.  There is no focal slowing.    INTERPRETATION:  Abnormal EEG due to moderate diffuse slowing and suppression of   the background rhythm suggesting a moderate encephalopathy.      NBB/IN  dd: 01/10/2019 13:27:07 (CST)  td: 01/10/2019 13:36:05 (CST)  Doc ID   #[id number]  Job ID #[id number]    CC:

## 2019-01-10 NOTE — PROGRESS NOTES
Physician Attestation for Scribe:  I, Sheryl Manuel MD, personally performed the services described in this documentation. All medical record entries made by the scribe were at my direction and in my presence.  I have reviewed this note and agree that the record reflects my personal performance and is accurate and complete.     Hospital Medicine  Progress Note     Patient Name: Vicente Luciano  MRN: [id number]  Team: [facility] Sheryl Manuel MD   Admit Date: 1/8/2019  NEDRA 1/11/2019  Code status: Full Code     Principal Problem:  Acute metabolic encephalopathy     Interval history:  Remains delirious. BGs improving.     Review of Systems   Constitutional: Negative for fever.   Respiratory: Negative for shortness of breath.          Physical Exam:  Pulse:  [84-92]   Resp:  [18-20]   BP: (153-188)/()   SpO2:  [96 %-99 %]       Temp: 97.1 °F (36.2 °C) (01/08/19 1912)  Pulse: 89 (01/09/19 0243)  Resp: 18 (01/09/19 0242)  BP: (!) 153/83 (01/09/19 0243)  SpO2: 96 % (01/09/19 0243)     Intake/Output Summary (Last 24 hours) at 1/9/2019 2023  Last data filed at 1/9/2019 0143      Gross per 24 hour   Intake 2000 ml   Output --   Net 2000 ml      Weight: 104.3 kg (230 lb)  Body mass index is 30.34 kg/m².     Physical Exam   Constitutional: No distress.   Eyes: Conjunctivae and lids are normal.   Cardiovascular: S1 normal and S2 normal.   Pulmonary/Chest: Effort normal and breath sounds normal.   Abdominal: Soft. Bowel sounds are normal. There is no tenderness.   Musculoskeletal: He exhibits no edema.   Neurological: He is alert. He is disoriented.   Psychiatric: His affect is inappropriate.         Significant Labs:       Recent Labs   Lab 01/08/19 2007 01/09/19  0530   WBC 6.98 7.82   HGB 16.3 14.5   HCT 46.9 42.7    156           Recent Labs   Lab 01/08/19 2022 01/09/19  0529   * 134*   K 4.7 4.3   CL 97 99   CO2 24 25   BUN 16 15   CREATININE 1.5* 1.4   * 547*   CALCIUM 9.2 9.1   MG 2.2  2.0   PHOS  --  3.0   ALKPHOS 122  --    ALT 30  --    AST 17  --    ALBUMIN 4.1  --    PROT 7.3  --    BILITOT 0.5  --    INR 1.0  --    LIPASE 25  --                Recent Labs   Lab 01/09/19  0159 01/09/19  0720 01/09/19  0748 01/09/19  0841 01/09/19  1057 01/09/19  1343   POCTGLUCOSE 263* 334* 363* 229* 203* 229*      A1C:        Recent Labs   Lab 01/08/19 2007 01/09/19  0529   HGBA1C 10.3* 10.5*          Recent Labs   Lab 01/08/19 2022   TROPONINI 0.007      Lactic Acid:       Recent Labs   Lab 01/08/19 2007   LACTATE 1.8      Urine Studies:       Recent Labs   Lab 01/08/19 2054   COLORU Straw   APPEARANCEUA Clear   PHUR 6.0   SPECGRAV 1.025   PROTEINUA Negative   GLUCUA 3+*   KETONESU Negative   BILIRUBINUA Negative   OCCULTUA Negative   NITRITE Negative   LEUKOCYTESUR Negative   RBCUA 1   WBCUA 1   BACTERIA None         Inpatient Medications prescribed for management of current Problems:   Scheduled Meds:    budesonide-formoterol 160-4.5 mcg  2 puff Inhalation Q12H    clopidogrel  75 mg Oral Daily    heparin (porcine)  5,000 Units Subcutaneous Q8H    insulin aspart U-100  10 Units Subcutaneous TIDWM    insulin detemir U-100  30 Units Subcutaneous QHS    losartan  50 mg Oral Daily    metoprolol succinate  50 mg Oral Daily    polyethylene glycol  17 g Oral Daily    sertraline  100 mg Oral Daily    simvastatin  80 mg Oral QHS      Continuous Infusions:   As Needed: acetaminophen, acetaminophen, albuterol-ipratropium, bisacodyl, butalbital-acetaminophen-caffeine -40 mg, dextrose 50%, dextrose 50%, glucagon (human recombinant), glucose, glucose, hydrALAZINE, insulin aspart U-100, OLANZapine, ondansetron, ramelteon, sodium chloride 0.9%            Active Hospital Problems     Diagnosis   POA    *Acute metabolic encephalopathy [G93.41]   Yes    Chronic kidney disease, stage III (moderate) [N18.3]   Yes    Hyponatremia [E87.1]   Yes    Type 2 diabetes mellitus with hyperglycemia, with  long-term current use of insulin [E11.65, Z79.4]   Not Applicable    CAD (coronary artery disease) [I25.10]   Yes       PTCA/stent 2010       Essential hypertension [I10]   Yes       1987       MS (multiple sclerosis) [G35]   Yes       1990       COPD (chronic obstructive pulmonary disease) with chronic bronchitis [J44.9]   Yes       Resolved Hospital Problems   No resolved problems to display.         Overview:    72 yo male on a background significant for MS, CAD, T2DM, HTN & COPD who is admitted for acute metabolic encephalopathy.      Assessment and Plan for Problems addressed today:     Acute metabolic encephalopathy  MS (multiple sclerosis)  · AMS likely multifactorial in setting of hyperglycemia, uncontrolled HTN and active MS.  · Received olanzapine x1 in ED. Continued home sertraline.   · Drug screen negative. UA & CXR unremarkable. 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 MRI: suggestive of active demyelination involving left caudate head.   · Consulting Neurology. (1/9)     Type 2 diabetes mellitus with hyperglycemia, with long-term current use of insulin  DMII with hypertension  · BG >400 on presentation. Received 1L LR & 26U detemir in ED.  · A1c (1/9): 10.5%. Home regimen: glipizide 5mg, linagliptin 5mg, metformin 1000mg; holding while admitted.    · Continue home medications: metoprolol succinate 50 mg daily, changed valsartan 160 mg to losartan 50 mg daily; hydralazine PRN.  · Initiated basal, prandial and low-dose correction insulin.  · Adjusting insulin doses. (1/9)      Chronic kidney disease, stage III (moderate)  · Stable. Baseline sCr: ~1.3.     CAD (coronary artery disease)  Hyperlipidemia   · EKG & troponin negative for ischemia   · Continued home clopidogrel and simvastatin.      COPD (chronic obstructive pulmonary disease) with chronic bronchitis  · Continued budesonide-formoterol inhaler and PRN Duonebs.      Hyponatremia, resolving  · In setting of hyperglycemia; improved  with BG correction and IVF.       Diet: Diet diabetic [facility]; 2000 Calorie; Cardiac (Low Na/Chol)     DVT Prophylaxis:         Anticoagulants   Medication Route Frequency    heparin (porcine) injection 5,000 Units Subcutaneous Q8H         L/D/A:  PIV     Discharge plan and follow up  Home or Self Care        Provider  Sheryl Manuel MD  [facility] MED D   Department of Hospital Medicine     Sohe[place]ate Attestation: I personally scribed for Sheryl Manuel MD on 01/09/2019 at 9:29 AM. Electronically signed by sheng Valdez on 01/09/2019 at 9:29 AM.

## 2019-01-10 NOTE — ASSESSMENT & PLAN NOTE
Bizarre behavior and nonsensical speech began evening of 1/8 after missing doses of SQ insulin earlier in the day. Luiza marques called at beginning of admission when he did not want to stay in the bed and he was placed in restraints with zyprexa prn.     1/9-not fully oriented and difficulty following multi-step commands  1/10-more awake and interactive, able to hold a conversation and says he remembers feeling confused yesterday. Wife and daughter not at bedside to say if he is at cognitive baseline.     Suspect some component of AMS was due to significant hyperglycemia (547) since mental status has improved when glucose normalized     Recommendations:  --routine EEG and B vitamins pending  --continue delirium precautions with regulation of sleep/wake, limit sedating medications as able  --continue correction of metabolic derangements per primary team

## 2019-01-10 NOTE — SUBJECTIVE & OBJECTIVE
Subjective:     Interval History:   Restraints removed, patient resting comfortably without report of abnormal behavior overnight  Glucose significantly improved to 169 today  More alert and interactive     Current Facility-Administered Medications   Medication Dose Route Frequency Provider Last Rate Last Dose    acetaminophen tablet 1,000 mg  1,000 mg Oral Q8H PRN GREG Alexandre-DAYANA        acetaminophen tablet 650 mg  650 mg Oral Q4H PRN GREG Alexandre-C   650 mg at 01/10/19 0105    albuterol-ipratropium 2.5 mg-0.5 mg/3 mL nebulizer solution 3 mL  3 mL Nebulization Q4H PRN GREG Alexandre-C        bisacodyl suppository 10 mg  10 mg Rectal Daily PRN GREG Alexandre-C        budesonide-formoterol 160-4.5 mcg inhaler 2 puff  2 puff Inhalation Q12H GREG Alexandre-C        butalbital-acetaminophen-caffeine -40 mg per tablet 1 tablet  1 tablet Oral Q6H PRN Jose Anguiano PA-C        clopidogrel tablet 75 mg  75 mg Oral Daily GERG Alexandre-C   75 mg at 01/10/19 0945    dextrose 50% injection 12.5 g  12.5 g Intravenous PRN GREG Alexandre-C        dextrose 50% injection 25 g  25 g Intravenous PRN GREG Alexandre-DAYANA        glucagon (human recombinant) injection 1 mg  1 mg Intramuscular PRN GREG Alexandre-DAYANA        glucose chewable tablet 16 g  16 g Oral PRN GREG Alexandre-C        glucose chewable tablet 24 g  24 g Oral PRN Jose Anguiano PA-C        heparin (porcine) injection 5,000 Units  5,000 Units Subcutaneous Q8H GREG Alexandre-C   5,000 Units at 01/10/19 0619    hydrALAZINE tablet 25 mg  25 mg Oral Q8H PRN GREG Alexandre-C   25 mg at 01/09/19 0202    insulin aspart U-100 pen 0-5 Units  0-5 Units Subcutaneous QID (AC + HS) PRN GREG Alexandre-C   2 Units at 01/09/19 1413    insulin aspart U-100 pen 10 Units  10 Units Subcutaneous TIDWM Sheryl Manuel MD   10 Units at 01/10/19 0948    insulin detemir U-100 pen 30 Units  30 Units Subcutaneous QHS Sheryl BURNETTE  MD Mar   30 Units at 01/09/19 2205    losartan tablet 50 mg  50 mg Oral Daily Jose Anguiano PA-C   50 mg at 01/10/19 0945    metoprolol succinate (TOPROL-XL) 24 hr tablet 50 mg  50 mg Oral Daily GREG Alexandre-C   50 mg at 01/10/19 0945    OLANZapine tablet 5 mg  5 mg Oral TID PRN Sheryl Manuel MD        ondansetron disintegrating tablet 8 mg  8 mg Oral Q8H PRN Jose Anguiano PA-C        polyethylene glycol packet 17 g  17 g Oral Daily GREG Alexandre-C   17 g at 01/10/19 0946    ramelteon tablet 8 mg  8 mg Oral Nightly PRN NEENA AlexandreC   8 mg at 01/09/19 1405    sertraline tablet 100 mg  100 mg Oral Daily NEENA AlexandreC   100 mg at 01/10/19 0945    simvastatin tablet 80 mg  80 mg Oral QHS GREG Alexandre-C   80 mg at 01/09/19 0213    sodium chloride 0.9% flush 5 mL  5 mL Intravenous PRN Jose Anguiano PA-C         Review of Systems   HENT: Negative for trouble swallowing and voice change.    Respiratory: Negative for shortness of breath.    Gastrointestinal: Negative for nausea and vomiting.   Neurological: Negative for dizziness, tremors, facial asymmetry, speech difficulty and headaches.   Psychiatric/Behavioral: Positive for confusion. Negative for agitation, behavioral problems and hallucinations. The patient is not hyperactive.      Objective:     Vital Signs (Most Recent):  Temp: 97.4 °F (36.3 °C) (01/10/19 0757)  Pulse: 78 (01/10/19 0757)  Resp: 18 (01/10/19 0757)  BP: 126/78 (01/10/19 0757)  SpO2: (!) 93 % (01/10/19 0757) Vital Signs (24h Range):  Temp:  [97.4 °F (36.3 °C)-100.3 °F (37.9 °C)] 97.4 °F (36.3 °C)  Pulse:  [74-86] 78  Resp:  [16-18] 18  SpO2:  [91 %-99 %] 93 %  BP: (126-166)/(72-82) 126/78     Weight: 104.3 kg (230 lb)  Body mass index is 30.34 kg/m².    Physical Exam   Eyes: EOM are normal.   Psychiatric: His speech is normal.     NEUROLOGICAL EXAMINATION:     MENTAL STATUS   Oriented to person.   Oriented to city. (Oriented to hospital)  Oriented to  month.   Follows 1 step commands.   Speech: speech is normal   Level of consciousness: alert       Shows insight that he was confused yesterday and says his wife and daughter were worried because his sugar was high     CRANIAL NERVES     CN III, IV, VI   Extraocular motions are normal.   Nystagmus: none   Ophthalmoparesis: none    CN V   Facial sensation intact.     CN VII   Facial expression full, symmetric.     CN VIII   Hearing: intact    CN IX, X   Palate: symmetric    MOTOR EXAM   Muscle bulk: normal  Overall muscle tone: normal  Right arm pronator drift: absent  Left arm pronator drift: absent    Strength   Right biceps: 5/5  Left biceps: 5/5  Right triceps: 5/5  Left triceps: 5/5  Right interossei: 5/5  Left interossei: 5/5  Right iliopsoas: 5/5  Left iliopsoas: 5/5  Right anterior tibial: 5/5  Left anterior tibial: 5/5  Right posterior tibial: 5/5  Left posterior tibial: 5/5    SENSORY EXAM   Light touch normal.     GAIT AND COORDINATION     Tremor   Resting tremor: absent    Significant Labs:   Blood Culture: No results for input(s): LABBLOO in the last 48 hours.  CBC:   Recent Labs   Lab 01/08/19 2007 01/09/19  0530 01/10/19  0640   WBC 6.98 7.82 9.89   HGB 16.3 14.5 15.7   HCT 46.9 42.7 45.6    156 185     CMP:   Recent Labs   Lab 01/08/19 2022 01/09/19  0529 01/10/19  0640   * 547* 169*   * 134* 137   K 4.7 4.3 4.0   CL 97 99 105   CO2 24 25 20*   BUN 16 15 24*   CREATININE 1.5* 1.4 1.5*   CALCIUM 9.2 9.1 9.6   MG 2.2 2.0 2.0   PROT 7.3  --   --    ALBUMIN 4.1  --  3.7   BILITOT 0.5  --   --    ALKPHOS 122  --   --    AST 17  --   --    ALT 30  --   --    ANIONGAP 9 10 12   EGFRNONAA 46.2* 50.2* 46.2*     Inflammatory Markers: No results for input(s): SEDRATE, CRP, PROCAL in the last 48 hours.  Urine Culture: No results for input(s): LABURIN in the last 48 hours.  Urine Studies:   Recent Labs   Lab 01/08/19 2054   COLORU Straw   APPEARANCEUA Clear   PHUR 6.0   SPECGRAV 1.025    PROTEINUA Negative   GLUCUA 3+*   KETONESU Negative   BILIRUBINUA Negative   OCCULTUA Negative   NITRITE Negative   LEUKOCYTESUR Negative   RBCUA 1   WBCUA 1   BACTERIA None     B12, folate and TSH wnl     Pending: B1, B2, RPR    All pertinent lab results from the past 24 hours have been reviewed.    Routine EEG (in process)    Significant Imaging: I have reviewed and interpreted all pertinent imaging results/findings within the past 24 hours.     MRI brain W WO contrast (demyelinating protocol) 1/9/19:   In this patient with a given history of multiple sclerosis there is a grossly stable appearance of diffuse demyelinating plaques, with a new focus of enhancement involving the left caudate head, suggesting a focus of active demyelination. No evidence of acute infarct or hemorrhage.

## 2019-01-10 NOTE — PROGRESS NOTES
[facility]  Neurology  Progress Note    Patient Name: Vicente Luciano  MRN: [id number]  Admission Date: 1/8/2019  Hospital Length of Stay: 0 days  Code Status: Full Code   Attending Provider: Sheryl Manuel MD  Primary Care Physician: MINGO Garvin MD   Principal Problem:Acute metabolic encephalopathy      Subjective:     Interval History:   Restraints removed, patient resting comfortably without report of abnormal behavior overnight  Glucose significantly improved to 169 today  More alert and interactive     Current Facility-Administered Medications   Medication Dose Route Frequency Provider Last Rate Last Dose    acetaminophen tablet 1,000 mg  1,000 mg Oral Q8H PRN Jose Anguiano, PA-C        acetaminophen tablet 650 mg  650 mg Oral Q4H PRN Jose Anguiano, PA-C   650 mg at 01/10/19 0105    albuterol-ipratropium 2.5 mg-0.5 mg/3 mL nebulizer solution 3 mL  3 mL Nebulization Q4H PRN Jose Anguiano, PA-C        bisacodyl suppository 10 mg  10 mg Rectal Daily PRN Jose Anguiano, PA-C        budesonide-formoterol 160-4.5 mcg inhaler 2 puff  2 puff Inhalation Q12H Jose Anguiano, PA-C        butalbital-acetaminophen-caffeine -40 mg per tablet 1 tablet  1 tablet Oral Q6H PRN Jose Anguiano, PA-C        clopidogrel tablet 75 mg  75 mg Oral Daily Jose Anguiano, PA-C   75 mg at 01/10/19 0945    dextrose 50% injection 12.5 g  12.5 g Intravenous PRN Jose Anguiano, PA-C        dextrose 50% injection 25 g  25 g Intravenous PRN Jose Anguiano, PA-C        glucagon (human recombinant) injection 1 mg  1 mg Intramuscular PRN Jose Anguiano, PA-C        glucose chewable tablet 16 g  16 g Oral PRN Jose Anguiano, PA-C        glucose chewable tablet 24 g  24 g Oral PRN Jose Anguiano, PA-C        heparin (porcine) injection 5,000 Units  5,000 Units Subcutaneous Q8H Jose Anguiano, PA-C   5,000 Units at 01/10/19 0619    hydrALAZINE tablet 25 mg  25 mg Oral Q8H PRN Jose Anguiano, PA-C   25 mg at 01/09/19  0202    insulin aspart U-100 pen 0-5 Units  0-5 Units Subcutaneous QID (AC + HS) PRN Jose Anguiano PA-C   2 Units at 01/09/19 1413    insulin aspart U-100 pen 10 Units  10 Units Subcutaneous TIDWM Sheryl Manuel MD   10 Units at 01/10/19 0948    insulin detemir U-100 pen 30 Units  30 Units Subcutaneous QHS Sheryl Manuel MD   30 Units at 01/09/19 2205    losartan tablet 50 mg  50 mg Oral Daily Jose Anguiano PA-C   50 mg at 01/10/19 0945    metoprolol succinate (TOPROL-XL) 24 hr tablet 50 mg  50 mg Oral Daily Jose Anguiano PA-C   50 mg at 01/10/19 0945    OLANZapine tablet 5 mg  5 mg Oral TID PRN Sheryl Manuel MD        ondansetron disintegrating tablet 8 mg  8 mg Oral Q8H PRN Jose Anguiano PA-C        polyethylene glycol packet 17 g  17 g Oral Daily Jose Anguiano PA-C   17 g at 01/10/19 0946    ramelteon tablet 8 mg  8 mg Oral Nightly PRN Jose Anguiano PA-C   8 mg at 01/09/19 1405    sertraline tablet 100 mg  100 mg Oral Daily Jose Anguiano PA-C   100 mg at 01/10/19 0945    simvastatin tablet 80 mg  80 mg Oral QHS Jose Anguiano PA-C   80 mg at 01/09/19 0213    sodium chloride 0.9% flush 5 mL  5 mL Intravenous PRN Jose Anguiano PA-C         Review of Systems   HENT: Negative for trouble swallowing and voice change.    Respiratory: Negative for shortness of breath.    Gastrointestinal: Negative for nausea and vomiting.   Neurological: Negative for dizziness, tremors, facial asymmetry, speech difficulty and headaches.   Psychiatric/Behavioral: Positive for confusion. Negative for agitation, behavioral problems and hallucinations. The patient is not hyperactive.      Objective:     Vital Signs (Most Recent):  Temp: 97.4 °F (36.3 °C) (01/10/19 0757)  Pulse: 78 (01/10/19 0757)  Resp: 18 (01/10/19 0757)  BP: 126/78 (01/10/19 0757)  SpO2: (!) 93 % (01/10/19 0757) Vital Signs (24h Range):  Temp:  [97.4 °F (36.3 °C)-100.3 °F (37.9 °C)] 97.4 °F (36.3 °C)  Pulse:  [74-86] 78  Resp:  [16-18]  18  SpO2:  [91 %-99 %] 93 %  BP: (126-166)/(72-82) 126/78     Weight: 104.3 kg (230 lb)  Body mass index is 30.34 kg/m².    Physical Exam   Eyes: EOM are normal.   Psychiatric: His speech is normal.     NEUROLOGICAL EXAMINATION:     MENTAL STATUS   Oriented to person.   Oriented to city. (Oriented to hospital)  Oriented to month.   Follows 1 step commands.   Speech: speech is normal   Level of consciousness: alert       Shows insight that he was confused yesterday and says his wife and daughter were worried because his sugar was high     CRANIAL NERVES     CN III, IV, VI   Extraocular motions are normal.   Nystagmus: none   Ophthalmoparesis: none    CN V   Facial sensation intact.     CN VII   Facial expression full, symmetric.     CN VIII   Hearing: intact    CN IX, X   Palate: symmetric    MOTOR EXAM   Muscle bulk: normal  Overall muscle tone: normal  Right arm pronator drift: absent  Left arm pronator drift: absent    Strength   Right biceps: 5/5  Left biceps: 5/5  Right triceps: 5/5  Left triceps: 5/5  Right interossei: 5/5  Left interossei: 5/5  Right iliopsoas: 5/5  Left iliopsoas: 5/5  Right anterior tibial: 5/5  Left anterior tibial: 5/5  Right posterior tibial: 5/5  Left posterior tibial: 5/5    SENSORY EXAM   Light touch normal.     GAIT AND COORDINATION     Tremor   Resting tremor: absent    Significant Labs:   Blood Culture: No results for input(s): LABBLOO in the last 48 hours.  CBC:   Recent Labs   Lab 01/08/19 2007 01/09/19 0530 01/10/19  0640   WBC 6.98 7.82 9.89   HGB 16.3 14.5 15.7   HCT 46.9 42.7 45.6    156 185     CMP:   Recent Labs   Lab 01/08/19 2022 01/09/19  0529 01/10/19  0640   * 547* 169*   * 134* 137   K 4.7 4.3 4.0   CL 97 99 105   CO2 24 25 20*   BUN 16 15 24*   CREATININE 1.5* 1.4 1.5*   CALCIUM 9.2 9.1 9.6   MG 2.2 2.0 2.0   PROT 7.3  --   --    ALBUMIN 4.1  --  3.7   BILITOT 0.5  --   --    ALKPHOS 122  --   --    AST 17  --   --    ALT 30  --   --    ANIONGAP  9 10 12   EGFRNONAA 46.2* 50.2* 46.2*     Inflammatory Markers: No results for input(s): SEDRATE, CRP, PROCAL in the last 48 hours.  Urine Culture: No results for input(s): LABURIN in the last 48 hours.  Urine Studies:   Recent Labs   Lab 01/08/19 2054   COLORU Straw   APPEARANCEUA Clear   PHUR 6.0   SPECGRAV 1.025   PROTEINUA Negative   GLUCUA 3+*   KETONESU Negative   BILIRUBINUA Negative   OCCULTUA Negative   NITRITE Negative   LEUKOCYTESUR Negative   RBCUA 1   WBCUA 1   BACTERIA None     --B12, folate and TSH wnl     Pending: B1, B2, RPR    All pertinent lab results from the past 24 hours have been reviewed.    Routine EEG (in process)    Significant Imaging: I have reviewed and interpreted all pertinent imaging results/findings within the past 24 hours.     MRI brain W WO contrast (demyelinating protocol) 1/9/19:   In this patient with a given history of multiple sclerosis there is a grossly stable appearance of diffuse demyelinating plaques, with a new focus of enhancement involving the left caudate head, suggesting a focus of active demyelination. No evidence of acute infarct or hemorrhage.    Assessment and Plan:     * Acute metabolic encephalopathy    Bizarre behavior and nonsensical speech began evening of 1/8 after missing doses of SQ insulin earlier in the day. Code shelli called at beginning of admission when he did not want to stay in the bed and he was placed in restraints with zyprexa prn.     1/9-not fully oriented and difficulty following multi-step commands  1/10-more awake and interactive, able to hold a conversation and says he remembers feeling confused yesterday. Wife and daughter not at bedside to say if he is at cognitive baseline.     Suspect some component of AMS was due to significant hyperglycemia (547) since mental status has improved when glucose normalized     Recommendations:  --routine EEG and B vitamins pending  --continue delirium precautions with regulation of sleep/wake, limit  sedating medications as able  --continue correction of metabolic derangements per primary team     MS (multiple sclerosis)    D[facility] 1990s[facility]    MRI with moderate disease burden and one new focus of active demyelination in left caudate head; however this lesion not contributing to AMS    --would hold off on IV Solumedrol at this time in setting of uncontrolled hyperglycemia and potential for steroid-induced psychosis   --f/u outpatient with [facility] or [facility] group at [facility] if he would like to establish care here      Hyponatremia    On initial labs and felt to be pseudohyponatremia in setting of hyperglycemia    --Resolved     Type 2 diabetes mellitus with hyperglycemia, with long-term current use of insulin    Glucose significantly improved to 169 today  --continue management per primary team     Essential hypertension    Management per primary team       VTE Risk Mitigation (From admission, onward)        Ordered     heparin (porcine) injection 5,000 Units  Every 8 hours      01/09/19 0051     IP VTE HIGH RISK PATIENT  Once      01/09/19 0051        Sho Sánchez PA-C  General Neurology Consult  Neuro Consult [facility] # [id number]

## 2019-01-10 NOTE — NURSING
Patient awoke from a nap oriented. Mild fever noted; tylenol given. Patient eating apple sauce and chicken. Restraints removed. [employer] and daughter at bedside. .

## 2019-01-10 NOTE — MEDICAL/APP STUDENT
Hospital Medicine  Progress Note    Patient Name: Vicente Luciano  MRN: [id number]  Team: [facility] MED D Sheryl Manuel MD   Admit Date: 1/8/2019  NEDRA 1/11/2019  Code status: Full Code    Principal Problem:  Acute metabolic encephalopathy    Interval history: Mental status at baseline per family at bedside. Pleasant and cooperative.     Review of Systems   Constitutional: Negative for fever.   Respiratory: Negative for shortness of breath.        Physical Exam:  Temp:  [97.4 °F (36.3 °C)-100.3 °F (37.9 °C)]   Pulse:  [74-86]   Resp:  [16-18]   BP: (126-166)/(72-82)   SpO2:  [91 %-99 %]      Temp: 97.4 °F (36.3 °C) (01/10/19 0757)  Pulse: 78 (01/10/19 0757)  Resp: 18 (01/10/19 0757)  BP: 126/78 (01/10/19 0757)  SpO2: (!) 93 % (01/10/19 0757)  No intake or output data in the 24 hours ending 01/10/19 0846  Weight: 104.3 kg (230 lb)  Body mass index is 30.34 kg/m².    Physical Exam   Constitutional: No distress.   Eyes: Conjunctivae and lids are normal.   Cardiovascular: S1 normal and S2 normal.   Pulmonary/Chest: Effort normal and breath sounds normal.   Abdominal: Soft. Bowel sounds are normal. There is no tenderness.   Musculoskeletal: He exhibits no edema.   Neurological: He is alert. He is not disoriented.   Psychiatric: Affect normal. He exhibits abnormal recent memory.       Significant Labs:  Recent Labs   Lab 01/08/19  2007 01/09/19  0530 01/10/19  0640   WBC 6.98 7.82 9.89   HGB 16.3 14.5 15.7   HCT 46.9 42.7 45.6    156 185     Recent Labs   Lab 01/08/19 2022 01/09/19  0529 01/10/19  0640   * 134* 137   K 4.7 4.3 4.0   CL 97 99 105   CO2 24 25 20*   BUN 16 15 24*   CREATININE 1.5* 1.4 1.5*   * 547* 169*   CALCIUM 9.2 9.1 9.6   MG 2.2 2.0 2.0   PHOS  --  3.0 4.2   ALKPHOS 122  --   --    ALT 30  --   --    AST 17  --   --    ALBUMIN 4.1  --  3.7   PROT 7.3  --   --    BILITOT 0.5  --   --    INR 1.0  --   --    LIPASE 25  --   --      Recent Labs   Lab 01/09/19  2204 01/10/19  0055  01/10/19  0618 01/10/19  0752 01/10/19  1159 01/10/19  1810   POCTGLUCOSE 116* 107 164* 147* 161* 222*     A1C:   Recent Labs   Lab 01/08/19 2007 01/09/19  0529   HGBA1C 10.3* 10.5*     Recent Labs   Lab 01/08/19 2022   TROPONINI 0.007     Lactic Acid:   Recent Labs   Lab 01/08/19 2007   LACTATE 1.8     Urine Studies:   Recent Labs   Lab 01/08/19 2054   COLORU Straw   APPEARANCEUA Clear   PHUR 6.0   SPECGRAV 1.025   PROTEINUA Negative   GLUCUA 3+*   KETONESU Negative   BILIRUBINUA Negative   OCCULTUA Negative   NITRITE Negative   LEUKOCYTESUR Negative   RBCUA 1   WBCUA 1   BACTERIA None       Inpatient Medications prescribed for management of current Problems:   Scheduled Meds:    budesonide-formoterol 160-4.5 mcg  2 puff Inhalation Q12H    clopidogrel  75 mg Oral Daily    heparin (porcine)  5,000 Units Subcutaneous Q8H    insulin aspart U-100  10 Units Subcutaneous TIDWM    insulin detemir U-100  30 Units Subcutaneous QHS    losartan  50 mg Oral Daily    metoprolol succinate  50 mg Oral Daily    polyethylene glycol  17 g Oral Daily    sertraline  100 mg Oral Daily    simvastatin  80 mg Oral QHS     Continuous Infusions:   As Needed: acetaminophen, acetaminophen, albuterol-ipratropium, bisacodyl, butalbital-acetaminophen-caffeine -40 mg, dextrose 50%, dextrose 50%, glucagon (human recombinant), glucose, glucose, hydrALAZINE, insulin aspart U-100, OLANZapine, ondansetron, ramelteon, sodium chloride 0.9%    Active Hospital Problems    Diagnosis  POA    *Acute metabolic encephalopathy [G93.41]  Yes    Chronic kidney disease, stage III (moderate) [N18.3]  Yes    Hyponatremia [E87.1]  Yes    Type 2 diabetes mellitus with hyperglycemia, with long-term current use of insulin [E11.65, Z79.4]  Not Applicable    CAD (coronary artery disease) [I25.10]  Yes     PTCA/stent 2010      Essential hypertension [I10]  Yes     1987      MS (multiple sclerosis) [G35]  Yes     1990      COPD (chronic  obstructive pulmonary disease) with chronic bronchitis [J44.9]  Yes      Resolved Hospital Problems   No resolved problems to display.       Overview:    72 yo male on a background significant for MS, CAD, T2DM, HTN & COPD who is admitted for acute metabolic encephalopathy.     Assessment and Plan for Problems addressed today:    Acute metabolic encephalopathy  MS (multiple sclerosis)  · AMS likely multifactorial in setting of hyperglycemia, uncontrolled HTN and active MS.  · Received olanzapine x1 in ED. Continued home sertraline.   · Drug screen negative. UA & CXR unremarkable. 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 MRI: suggestive of active demyelination involving left caudate head.   · Consulted Neurology; Ordered B1, B2, folate, RPR and routine EEG. (1/9)  · B12 & TSH wnl. EEG suggesting moderate encephalopathy. (1/10)    Type 2 diabetes mellitus with hyperglycemia, with long-term current use of insulin  DMII with hypertension  · BG >400 on presentation. Received 1L LR & 26U detemir in ED.  · A1c (1/9): 10.5%. Home regimen: glipizide 5mg, linagliptin 5mg, metformin 1000mg; holding while admitted.    · Continue home medications: metoprolol succinate 50 mg daily, changed valsartan 160 mg to losartan 50 mg daily; hydralazine PRN.  · Initiated basal, prandial and low-dose correction insulin.  · Adjusting insulin doses. (1/9)   · BG has improved.     Chronic kidney disease, stage III (moderate)  · Stable. Baseline sCr: ~1.3.    CAD (coronary artery disease)  Hyperlipidemia   · EKG & troponin negative for ischemia   · Continued home clopidogrel and simvastatin.     COPD (chronic obstructive pulmonary disease) with chronic bronchitis  · Continued budesonide-formoterol inhaler and PRN Duonebs.     Hyponatremia, resolving  · In setting of hyperglycemia; improved with BG correction and IVF.      Diet: Diet diabetic [facility]; 2000 Calorie; Cardiac (Low Na/Chol)    DVT Prophylaxis:   Anticoagulants    Medication Route Frequency    heparin (porcine) injection 5,000 Units Subcutaneous Q8H       L/D/A:  PIV    Discharge plan and follow up  Home or Self Care      Provider  Sheryl Manuel MD  [facility]    Department of Hospital Medicine    Sheng Attestation: I personally scribed for Sheryl Manuel MD on 01/10/2019 at 9:29 AM. Electronically signed by sheng Valdez on 01/10/2019 at 9:29 AM.

## 2019-01-11 LAB
ALBUMIN SERPL BCP-MCNC: 3.6 G/DL
ANION GAP SERPL CALC-SCNC: 11 MMOL/L
BASOPHILS # BLD AUTO: 0.04 K/UL
BASOPHILS NFR BLD: 0.6 %
BUN SERPL-MCNC: 31 MG/DL
CALCIUM SERPL-MCNC: 8.9 MG/DL
CHLORIDE SERPL-SCNC: 106 MMOL/L
CO2 SERPL-SCNC: 20 MMOL/L
CREAT SERPL-MCNC: 1.3 MG/DL
DIFFERENTIAL METHOD: NORMAL
EOSINOPHIL # BLD AUTO: 0 K/UL
EOSINOPHIL NFR BLD: 0.5 %
ERYTHROCYTE [DISTWIDTH] IN BLOOD BY AUTOMATED COUNT: 12.8 %
EST. GFR  (AFRICAN AMERICAN): >60 ML/MIN/1.73 M^2
EST. GFR  (NON AFRICAN AMERICAN): 54.9 ML/MIN/1.73 M^2
GLUCOSE SERPL-MCNC: 95 MG/DL
HCT VFR BLD AUTO: 44.9 %
HGB BLD-MCNC: 15.2 G/DL
IMM GRANULOCYTES # BLD AUTO: 0.02 K/UL
IMM GRANULOCYTES NFR BLD AUTO: 0.3 %
LYMPHOCYTES # BLD AUTO: 1.9 K/UL
LYMPHOCYTES NFR BLD: 28.6 %
MAGNESIUM SERPL-MCNC: 2.1 MG/DL
MCH RBC QN AUTO: 29.6 PG
MCHC RBC AUTO-ENTMCNC: 33.9 G/DL
MCV RBC AUTO: 88 FL
MONOCYTES # BLD AUTO: 0.5 K/UL
MONOCYTES NFR BLD: 7.6 %
NEUTROPHILS # BLD AUTO: 4.1 K/UL
NEUTROPHILS NFR BLD: 62.4 %
NRBC BLD-RTO: 0 /100 WBC
PHOSPHATE SERPL-MCNC: 3.7 MG/DL
PLATELET # BLD AUTO: 160 K/UL
PMV BLD AUTO: 10.7 FL
POCT GLUCOSE: 102 MG/DL (ref 70–110)
POCT GLUCOSE: 103 MG/DL (ref 70–110)
POCT GLUCOSE: 106 MG/DL (ref 70–110)
POCT GLUCOSE: 132 MG/DL (ref 70–110)
POCT GLUCOSE: 204 MG/DL (ref 70–110)
POTASSIUM SERPL-SCNC: 3.9 MMOL/L
RBC # BLD AUTO: 5.13 M/UL
RPR SER QL: NORMAL
SODIUM SERPL-SCNC: 137 MMOL/L
WBC # BLD AUTO: 6.57 K/UL

## 2019-01-11 PROCEDURE — 80069 RENAL FUNCTION PANEL: CPT

## 2019-01-11 PROCEDURE — G0378 HOSPITAL OBSERVATION PER HR: HCPCS

## 2019-01-11 PROCEDURE — 25000242 PHARM REV CODE 250 ALT 637 W/ HCPCS: Performed by: PHYSICIAN ASSISTANT

## 2019-01-11 PROCEDURE — 36415 COLL VENOUS BLD VENIPUNCTURE: CPT

## 2019-01-11 PROCEDURE — 85025 COMPLETE CBC W/AUTO DIFF WBC: CPT

## 2019-01-11 PROCEDURE — 99225 PR SUBSEQUENT OBSERVATION CARE,LEVEL II: ICD-10-PCS | Mod: ,,, | Performed by: INTERNAL MEDICINE

## 2019-01-11 PROCEDURE — 25000003 PHARM REV CODE 250: Performed by: PHYSICIAN ASSISTANT

## 2019-01-11 PROCEDURE — 63600175 PHARM REV CODE 636 W HCPCS: Performed by: PHYSICIAN ASSISTANT

## 2019-01-11 PROCEDURE — 83735 ASSAY OF MAGNESIUM: CPT

## 2019-01-11 PROCEDURE — 99225 PR SUBSEQUENT OBSERVATION CARE,LEVEL II: CPT | Mod: ,,, | Performed by: INTERNAL MEDICINE

## 2019-01-11 PROCEDURE — 99214 OFFICE O/P EST MOD 30 MIN: CPT | Mod: ,,, | Performed by: PSYCHIATRY & NEUROLOGY

## 2019-01-11 PROCEDURE — 99214 PR OFFICE/OUTPT VISIT, EST, LEVL IV, 30-39 MIN: ICD-10-PCS | Mod: ,,, | Performed by: PSYCHIATRY & NEUROLOGY

## 2019-01-11 RX ADMIN — ACETAMINOPHEN 1000 MG: 500 TABLET ORAL at 01:01

## 2019-01-11 RX ADMIN — BUDESONIDE AND FORMOTEROL FUMARATE DIHYDRATE 2 PUFF: 160; 4.5 AEROSOL RESPIRATORY (INHALATION) at 09:01

## 2019-01-11 RX ADMIN — INSULIN ASPART 10 UNITS: 100 INJECTION, SOLUTION INTRAVENOUS; SUBCUTANEOUS at 10:01

## 2019-01-11 RX ADMIN — BUDESONIDE AND FORMOTEROL FUMARATE DIHYDRATE 2 PUFF: 160; 4.5 AEROSOL RESPIRATORY (INHALATION) at 03:01

## 2019-01-11 RX ADMIN — METOPROLOL SUCCINATE 50 MG: 50 TABLET, EXTENDED RELEASE ORAL at 12:01

## 2019-01-11 RX ADMIN — HEPARIN SODIUM 5000 UNITS: 5000 INJECTION, SOLUTION INTRAVENOUS; SUBCUTANEOUS at 09:01

## 2019-01-11 RX ADMIN — INSULIN ASPART 10 UNITS: 100 INJECTION, SOLUTION INTRAVENOUS; SUBCUTANEOUS at 09:01

## 2019-01-11 RX ADMIN — HEPARIN SODIUM 5000 UNITS: 5000 INJECTION, SOLUTION INTRAVENOUS; SUBCUTANEOUS at 03:01

## 2019-01-11 RX ADMIN — LOSARTAN POTASSIUM 50 MG: 50 TABLET, FILM COATED ORAL at 09:01

## 2019-01-11 RX ADMIN — INSULIN ASPART 10 UNITS: 100 INJECTION, SOLUTION INTRAVENOUS; SUBCUTANEOUS at 12:01

## 2019-01-11 RX ADMIN — INSULIN ASPART 2 UNITS: 100 INJECTION, SOLUTION INTRAVENOUS; SUBCUTANEOUS at 12:01

## 2019-01-11 RX ADMIN — SERTRALINE 100 MG: 100 TABLET, FILM COATED ORAL at 09:01

## 2019-01-11 RX ADMIN — SIMVASTATIN 80 MG: 20 TABLET, FILM COATED ORAL at 09:01

## 2019-01-11 RX ADMIN — CLOPIDOGREL 75 MG: 75 TABLET, FILM COATED ORAL at 09:01

## 2019-01-11 RX ADMIN — HEPARIN SODIUM 5000 UNITS: 5000 INJECTION, SOLUTION INTRAVENOUS; SUBCUTANEOUS at 05:01

## 2019-01-11 RX ADMIN — POLYETHYLENE GLYCOL 3350 17 G: 17 POWDER, FOR SOLUTION ORAL at 09:01

## 2019-01-11 RX ADMIN — INSULIN DETEMIR 30 UNITS: 100 INJECTION, SOLUTION SUBCUTANEOUS at 09:01

## 2019-01-11 NOTE — ASSESSMENT & PLAN NOTE
Dx 1990s, [facility] to [facility] and per primary team, on Avonex  Pt unable to name Neurologist and does not know when he was last seen    MRI with moderate disease burden and one new focus of active demyelination in left caudate head  -- after discussion with MS team, do not recommend treated as lesion is asymptomatic

## 2019-01-11 NOTE — MEDICAL/APP STUDENT
Hospital Medicine  Progress Note    Patient Name: Vicente Luciano  MRN: [id number]  [place] [facility] [place] Sheryl Manuel MD   Admit Date: 1/8/2019  NEDRA 1/15/2019  Code status: Full Code    Principal Problem:  Acute metabolic encephalopathy    Interval history: Patient with no new complaints.    Review of Systems   Constitutional: Negative for fever.   Respiratory: Negative for shortness of breath.        Physical Exam:  Temp:  [97.6 °F (36.4 °C)-98.7 °F (37.1 °C)]   Pulse:  [71-80]   Resp:  [18]   BP: (104-139)/(63-81)   SpO2:  [92 %-94 %]      Temp: 98.6 °F (37 °C) (01/11/19 0552)  Pulse: 71 (01/11/19 0552)  Resp: 18 (01/10/19 1642)  BP: 118/81 (01/11/19 0552)  SpO2: (!) 94 % (01/11/19 0552)  No intake or output data in the 24 hours ending 01/11/19 0844  Weight: 104.3 kg (230 lb)  Body mass index is 30.34 kg/m².    Physical Exam   Constitutional: No distress.   Eyes: Conjunctivae and lids are normal.   Cardiovascular: S1 normal and S2 normal.   Pulmonary/Chest: Effort normal and breath sounds normal.   Abdominal: Soft. Bowel sounds are normal. There is no tenderness.   Musculoskeletal: He exhibits no edema.   Neurological: He is alert.   Psychiatric: Affect normal.       Significant Labs:  Recent Labs   Lab 01/08/19  2007 01/09/19  0530 01/10/19  0640 01/11/19  0425   WBC 6.98 7.82 9.89 6.57   HGB 16.3 14.5 15.7 15.2   HCT 46.9 42.7 45.6 44.9    156 185 160     Recent Labs   Lab 01/08/19  2022 01/09/19  0529 01/10/19  0640 01/11/19  0425   * 134* 137 137   K 4.7 4.3 4.0 3.9   CL 97 99 105 106   CO2 24 25 20* 20*   BUN 16 15 24* 31*   CREATININE 1.5* 1.4 1.5* 1.3   * 547* 169* 95   CALCIUM 9.2 9.1 9.6 8.9   MG 2.2 2.0 2.0 2.1   PHOS  --  3.0 4.2 3.7   ALKPHOS 122  --   --   --    ALT 30  --   --   --    AST 17  --   --   --    ALBUMIN 4.1  --  3.7 3.6   PROT 7.3  --   --   --    BILITOT 0.5  --   --   --    INR 1.0  --   --   --    LIPASE 25  --   --   --      BranchOut   Lab 01/10/19  0754  01/10/19  1159 01/10/19  1810 01/10/19  2202 01/11/19  0555 01/11/19  1133   POCTGLUCOSE 147* 161* 222* 107 102 204*     A1C:   Recent Labs   Lab 01/08/19 2007 01/09/19  0529   HGBA1C 10.3* 10.5*     Recent Labs   Lab 01/08/19  2022   TROPONINI 0.007       Inpatient Medications prescribed for management of current Problems:   Scheduled Meds:    budesonide-formoterol 160-4.5 mcg  2 puff Inhalation Q12H    clopidogrel  75 mg Oral Daily    heparin (porcine)  5,000 Units Subcutaneous Q8H    insulin aspart U-100  10 Units Subcutaneous TIDWM    insulin detemir U-100  30 Units Subcutaneous QHS    losartan  50 mg Oral Daily    metoprolol succinate  50 mg Oral Daily    polyethylene glycol  17 g Oral Daily    sertraline  100 mg Oral Daily    simvastatin  80 mg Oral QHS     Continuous Infusions:   As Needed: acetaminophen, acetaminophen, albuterol-ipratropium, bisacodyl, butalbital-acetaminophen-caffeine -40 mg, dextrose 50%, dextrose 50%, glucagon (human recombinant), glucose, glucose, hydrALAZINE, insulin aspart U-100, OLANZapine, ondansetron, ramelteon, sodium chloride 0.9%    Active Hospital Problems    Diagnosis  POA    *Acute metabolic encephalopathy [G93.41]  Yes    Chronic kidney disease, stage III (moderate) [N18.3]  Yes    Hyponatremia [E87.1]  Yes    Type 2 diabetes mellitus with hyperglycemia, with long-term current use of insulin [E11.65, Z79.4]  Not Applicable    CAD (coronary artery disease) [I25.10]  Yes     PTCA/stent 2010      Essential hypertension [I10]  Yes     1987      MS (multiple sclerosis) [G35]  Yes     1990      COPD (chronic obstructive pulmonary disease) with chronic bronchitis [J44.9]  Yes      Resolved Hospital Problems   No resolved problems to display.       Overview:    74 yo male on a background significant for MS, CAD, T2DM, HTN & COPD who is admitted for acute metabolic encephalopathy. Drug screen, UA, CXR were unremarkable, but BG >400 on presentation. CTH did not  reveal any acute findings, but MRI was suggestive of active demyelination involving the left caudate head; neurology was consulted. These findings were not believed to contribute to his AMS and further work up was pursued. An EEG (1/10) indicated moderate encephalopathy. His mental status returned to baseline, as his BGs improved.     Assessment and Plan for Problems addressed today:    Acute metabolic encephalopathy  MS (multiple sclerosis)  · AMS likely multifactorial in setting of hyperglycemia, uncontrolled HTN and active MS.  · Received olanzapine x1 in ED. Continued home sertraline.   · Drug screen negative. UA & CXR unremarkable. 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 MRI: suggestive of active demyelination involving left caudate head.   · Consulted Neurology; Ordered B1, B2, folate and routine EEG. (1/9)  · B12 & TSH wnl, RPR non-reactive. EEG suggesting moderate encephalopathy. (1/10)  · Appears to be back to baseline.     Type 2 diabetes mellitus with hyperglycemia, with long-term current use of insulin  DMII with hypertension  · BG >400 on presentation. Received 1L LR & 26U detemir in ED.  · A1c (1/9): 10.5%. Home regimen: glipizide 5mg, linagliptin 5mg, metformin 1000mg; holding while admitted.    · Continue home medications: metoprolol succinate 50 mg daily, changed valsartan 160 mg to losartan 50 mg daily; hydralazine PRN.  · Initiated basal, prandial and low-dose correction insulin.  · Adjusting insulin doses. (1/9)   · BG has improved.  · Plan to resume home antihyperglycemics if feasible with initiation of long-acting insulin. [facility] (1/11)     Chronic kidney disease, stage III (moderate)  · Stable. Baseline sCr: ~1.3.    CAD (coronary artery disease)  Hyperlipidemia   · EKG & troponin negative for ischemia   · Continued home clopidogrel and simvastatin.     COPD (chronic obstructive pulmonary disease) with chronic bronchitis  · Continued  budesonide-formoterol inhaler and PRN Duonebs.     Hyponatremia, resolving  · In setting of hyperglycemia; improved with BG correction and IVF.      Diet: Diet diabetic [facility]; 2000 Calorie; Cardiac (Low Na/Chol)    DVT Prophylaxis:   Anticoagulants   Medication Route Frequency    heparin (porcine) injection 5,000 Units Subcutaneous Q8H       L/D/A:  PIV    Discharge plan and follow up  Home or Self Care      Provider  Sheryl Manuel MD  [facility] [place] [facility] MED D   Department of Hospital Medicine    Scribe Attestation: I personally scribed for Sheryl Manuel MD on 01/11/2019 at 9:29 AM. Electronically signed by sheng Valdez on 01/11/2019 at 9:29 AM.

## 2019-01-11 NOTE — PROGRESS NOTES
Physician Attestation for Scribe:  I, Sheryl Manuel MD, personally performed the services described in this documentation. All medical record entries made by the scribe were at my direction and in my presence.  I have reviewed this note and agree that the record reflects my personal performance and is accurate and complete.     Hospital Medicine  Progress Note     Patient Name: Vicente Luciano  MRN: [id number]  Team: [facility] MED D Sheryl Manuel MD   Admit Date: 1/8/2019  NEDRA 1/11/2019  Code status: Full Code     Principal Problem:  Acute metabolic encephalopathy     Interval history: Mental status at baseline per family at bedside. Pleasant and cooperative.      Review of Systems   Constitutional: Negative for fever.   Respiratory: Negative for shortness of breath.          Physical Exam:  Temp:  [97.4 °F (36.3 °C)-100.3 °F (37.9 °C)]   Pulse:  [74-86]   Resp:  [16-18]   BP: (126-166)/(72-82)   SpO2:  [91 %-99 %]       Temp: 97.4 °F (36.3 °C) (01/10/19 0757)  Pulse: 78 (01/10/19 0757)  Resp: 18 (01/10/19 0757)  BP: 126/78 (01/10/19 0757)  SpO2: (!) 93 % (01/10/19 0757)  No intake or output data in the 24 hours ending 01/10/19 0846  Weight: 104.3 kg (230 lb)  Body mass index is 30.34 kg/m².     Physical Exam   Constitutional: No distress.   Eyes: Conjunctivae and lids are normal.   Cardiovascular: S1 normal and S2 normal.   Pulmonary/Chest: Effort normal and breath sounds normal.   Abdominal: Soft. Bowel sounds are normal. There is no tenderness.   Musculoskeletal: He exhibits no edema.   Neurological: He is alert. He is not disoriented.   Psychiatric: Affect normal. He exhibits abnormal recent memory.         Significant Labs:  Recent Labs   Lab 01/08/19 2007 01/09/19  0530 01/10/19  0640   WBC 6.98 7.82 9.89   HGB 16.3 14.5 15.7   HCT 46.9 42.7 45.6    156 185            Recent Labs   Lab 01/08/19 2022 01/09/19  0529 01/10/19  0640   * 134* 137   K 4.7 4.3 4.0   CL 97 99 105   CO2 24 25 20*    BUN 16 15 24*   CREATININE 1.5* 1.4 1.5*   * 547* 169*   CALCIUM 9.2 9.1 9.6   MG 2.2 2.0 2.0   PHOS  --  3.0 4.2   ALKPHOS 122  --   --    ALT 30  --   --    AST 17  --   --    ALBUMIN 4.1  --  3.7   PROT 7.3  --   --    BILITOT 0.5  --   --    INR 1.0  --   --    LIPASE 25  --   --                Recent Labs   Lab 01/09/19  2204 01/10/19  0055 01/10/19  0618 01/10/19  0752 01/10/19  1159 01/10/19  1810   POCTGLUCOSE 116* 107 164* 147* 161* 222*      A1C:        Recent Labs   Lab 01/08/19 2007 01/09/19  0529   HGBA1C 10.3* 10.5*          Recent Labs   Lab 01/08/19 2022   TROPONINI 0.007      Lactic Acid:       Recent Labs   Lab 01/08/19 2007   LACTATE 1.8      Urine Studies:       Recent Labs   Lab 01/08/19 2054   COLORU Straw   APPEARANCEUA Clear   PHUR 6.0   SPECGRAV 1.025   PROTEINUA Negative   GLUCUA 3+*   KETONESU Negative   BILIRUBINUA Negative   OCCULTUA Negative   NITRITE Negative   LEUKOCYTESUR Negative   RBCUA 1   WBCUA 1   BACTERIA None         Inpatient Medications prescribed for management of current Problems:   Scheduled Meds:    budesonide-formoterol 160-4.5 mcg  2 puff Inhalation Q12H    clopidogrel  75 mg Oral Daily    heparin (porcine)  5,000 Units Subcutaneous Q8H    insulin aspart U-100  10 Units Subcutaneous TIDWM    insulin detemir U-100  30 Units Subcutaneous QHS    losartan  50 mg Oral Daily    metoprolol succinate  50 mg Oral Daily    polyethylene glycol  17 g Oral Daily    sertraline  100 mg Oral Daily    simvastatin  80 mg Oral QHS      Continuous Infusions:   As Needed: acetaminophen, acetaminophen, albuterol-ipratropium, bisacodyl, butalbital-acetaminophen-caffeine -40 mg, dextrose 50%, dextrose 50%, glucagon (human recombinant), glucose, glucose, hydrALAZINE, insulin aspart U-100, OLANZapine, ondansetron, ramelteon, sodium chloride 0.9%            Active Hospital Problems     Diagnosis   POA    *Acute metabolic encephalopathy [G93.41]   Yes    Chronic  kidney disease, stage III (moderate) [N18.3]   Yes    Hyponatremia [E87.1]   Yes    Type 2 diabetes mellitus with hyperglycemia, with long-term current use of insulin [E11.65, Z79.4]   Not Applicable    CAD (coronary artery disease) [I25.10]   Yes       PTCA/stent 2010       Essential hypertension [I10]   Yes       1987       MS (multiple sclerosis) [G35]   Yes       1990       COPD (chronic obstructive pulmonary disease) with chronic bronchitis [J44.9]   Yes       Resolved Hospital Problems   No resolved problems to display.         Overview:    72 yo male on a background significant for MS, CAD, T2DM, HTN & COPD who is admitted for acute metabolic encephalopathy.      Assessment and Plan for Problems addressed today:     Acute metabolic encephalopathy  MS (multiple sclerosis)  · AMS likely multifactorial in setting of hyperglycemia, uncontrolled HTN and active MS.  · Received olanzapine x1 in ED. Continued home sertraline.   · Drug screen negative. UA & CXR unremarkable. 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 MRI: suggestive of active demyelination involving left caudate head.   · Consulted Neurology; Ordered B1, B2, folate, RPR and routine EEG. (1/9)  · B12 & TSH wnl. EEG suggesting moderate encephalopathy. (1/10)     Type 2 diabetes mellitus with hyperglycemia, with long-term current use of insulin  DMII with hypertension  · BG >400 on presentation. Received 1L LR & 26U detemir in ED.  · A1c (1/9): 10.5%. Home regimen: glipizide 5mg, linagliptin 5mg, metformin 1000mg; holding while admitted.    · Continue home medications: metoprolol succinate 50 mg daily, changed valsartan 160 mg to losartan 50 mg daily; hydralazine PRN.  · Initiated basal, prandial and low-dose correction insulin.  · Adjusting insulin doses. (1/9)   · BG has improved.      Chronic kidney disease, stage III (moderate)  · Stable. Baseline sCr: ~1.3.     CAD (coronary artery disease)  Hyperlipidemia   · EKG & troponin negative  for ischemia   · Continued home clopidogrel and simvastatin.      COPD (chronic obstructive pulmonary disease) with chronic bronchitis  · Continued budesonide-formoterol inhaler and PRN Duonebs.      Hyponatremia, resolving  · In setting of hyperglycemia; improved with BG correction and IVF.       Diet: Diet diabetic [facility]; 2000 Calorie; Cardiac (Low Na/Chol)     DVT Prophylaxis:         Anticoagulants   Medication Route Frequency    heparin (porcine) injection 5,000 Units Subcutaneous Q8H         L/D/A:  PIV     Discharge plan and follow up  Home or Self Care        Provider  Sheryl Manuel MD  [facility] MED D   Department of Hospital Medicine     Sheng Attestation: I personally scribed for Sheryl Manuel MD on 01/10/2019 at 9:29 AM. Electronically signed by sheng Valdez on 01/10/2019 at 9:29 AM.

## 2019-01-11 NOTE — SUBJECTIVE & OBJECTIVE
Subjective:     Interval History: No events overnight. He is reportedly back to baseline per family, no family at bedside this morning. He denies any issues.    Current Facility-Administered Medications   Medication Dose Route Frequency Provider Last Rate Last Dose    acetaminophen tablet 1,000 mg  1,000 mg Oral Q8H PRN GREG Alexandre-C   1,000 mg at 01/10/19 1358    acetaminophen tablet 650 mg  650 mg Oral Q4H PRN GREG Alexandre-C   650 mg at 01/10/19 0105    albuterol-ipratropium 2.5 mg-0.5 mg/3 mL nebulizer solution 3 mL  3 mL Nebulization Q4H PRN GREG Alexandre-DAYANA        bisacodyl suppository 10 mg  10 mg Rectal Daily PRN GREG Alexandre-DAYANA        budesonide-formoterol 160-4.5 mcg inhaler 2 puff  2 puff Inhalation Q12H GREG Alexandre-C        butalbital-acetaminophen-caffeine -40 mg per tablet 1 tablet  1 tablet Oral Q6H PRN Jose Anguiano PA-C        clopidogrel tablet 75 mg  75 mg Oral Daily GREG Alexandre-C   75 mg at 01/11/19 0931    dextrose 50% injection 12.5 g  12.5 g Intravenous PRN GREG Alexandre-C        dextrose 50% injection 25 g  25 g Intravenous PRN GREG Alexandre-C        glucagon (human recombinant) injection 1 mg  1 mg Intramuscular PRN Jose Anguiano PA-C        glucose chewable tablet 16 g  16 g Oral PRN GREG Alexandre-C        glucose chewable tablet 24 g  24 g Oral PRN GREG Alexandre-C        heparin (porcine) injection 5,000 Units  5,000 Units Subcutaneous Q8H GREG Alexandre-C   5,000 Units at 01/11/19 0556    hydrALAZINE tablet 25 mg  25 mg Oral Q8H PRN GREG Alexandre-C   25 mg at 01/09/19 0202    insulin aspart U-100 pen 0-5 Units  0-5 Units Subcutaneous QID (AC + HS) PRN GREG Alexandre-C   2 Units at 01/10/19 1812    insulin aspart U-100 pen 10 Units  10 Units Subcutaneous TIDWM Sheryl Manuel MD   10 Units at 01/11/19 0930    insulin detemir U-100 pen 30 Units  30 Units Subcutaneous QHS Sheryl Manuel MD   30 Units at  01/10/19 2158    losartan tablet 50 mg  50 mg Oral Daily GREG Alexandre-C   50 mg at 01/11/19 0929    metoprolol succinate (TOPROL-XL) 24 hr tablet 50 mg  50 mg Oral Daily GREG Alexandre-C   50 mg at 01/10/19 0945    OLANZapine tablet 5 mg  5 mg Oral TID PRN Sheryl Manuel MD        ondansetron disintegrating tablet 8 mg  8 mg Oral Q8H PRN GREG Alexandre-C        polyethylene glycol packet 17 g  17 g Oral Daily Jose Anguiano, PA-C   17 g at 01/11/19 0929    ramelteon tablet 8 mg  8 mg Oral Nightly PRN GREG Alexandre-C   8 mg at 01/09/19 1405    sertraline tablet 100 mg  100 mg Oral Daily GREG Alexandre-C   100 mg at 01/11/19 0929    simvastatin tablet 80 mg  80 mg Oral QHS GREG Alexandre-C   80 mg at 01/09/19 0213    sodium chloride 0.9% flush 5 mL  5 mL Intravenous PRN Jose Anguiano PA-C         Review of Systems   Gastrointestinal: Negative for nausea and vomiting.   Neurological: Negative for dizziness, tremors, facial asymmetry, speech difficulty and headaches.   Psychiatric/Behavioral: Negative for agitation, behavioral problems and hallucinations. The patient is not hyperactive.      Objective:     Vital Signs (Most Recent):  Temp: 98 °F (36.7 °C) (01/11/19 0926)  Pulse: 78 (01/11/19 0926)  Resp: 19 (01/11/19 0926)  BP: 127/69 (01/11/19 0926)  SpO2: 96 % (01/11/19 0926) Vital Signs (24h Range):  Temp:  [97.6 °F (36.4 °C)-98.7 °F (37.1 °C)] 98 °F (36.7 °C)  Pulse:  [71-80] 78  Resp:  [18-19] 19  SpO2:  [92 %-96 %] 96 %  BP: (104-139)/(63-81) 127/69     Weight: 104.3 kg (230 lb)  Body mass index is 30.34 kg/m².    Physical Exam   Constitutional: He appears well-developed and well-nourished.   Eyes: EOM are normal.   Neurological: He has a normal Finger-Nose-Finger Test.   Psychiatric: His speech is normal.   Nursing note and vitals reviewed.    NEUROLOGICAL EXAMINATION:     MENTAL STATUS   Oriented to person.   Oriented to city. (Oriented to hospital)  Disoriented to year. Oriented  to month. (Says it's 2020)  Follows 2 step commands.   Speech: speech is normal   Level of consciousness: alert       Knows he is in the hospital for high blood sugar and confusion     CRANIAL NERVES     CN III, IV, VI   Extraocular motions are normal.   Nystagmus: none   Ophthalmoparesis: none    CN V   Facial sensation intact.     CN VII   Facial expression full, symmetric.     CN VIII   Hearing: intact    MOTOR EXAM   Muscle bulk: normal  Overall muscle tone: normal  Right arm pronator drift: absent  Left arm pronator drift: absent    Strength   Right biceps: 5/5  Left biceps: 5/5  Right triceps: 5/5  Left triceps: 5/5  Right interossei: 5/5  Left interossei: 5/5  Right iliopsoas: 5/5  Left iliopsoas: 5/5    SENSORY EXAM   Light touch normal.     GAIT AND COORDINATION      Coordination   Finger to nose coordination: normal    Tremor   Resting tremor: absent  Intention tremor: absent    Significant Labs:   Blood Culture: No results for input(s): LABBLOO in the last 48 hours.  CBC:   Recent Labs   Lab 01/10/19  0640 01/11/19  0425   WBC 9.89 6.57   HGB 15.7 15.2   HCT 45.6 44.9    160     CMP:   Recent Labs   Lab 01/10/19  0640 01/11/19  0425   * 95    137   K 4.0 3.9    106   CO2 20* 20*   BUN 24* 31*   CREATININE 1.5* 1.3   CALCIUM 9.6 8.9   MG 2.0 2.1   ALBUMIN 3.7 3.6   ANIONGAP 12 11   EGFRNONAA 46.2* 54.9*     Inflammatory Markers: No results for input(s): SEDRATE, CRP, PROCAL in the last 48 hours.  Urine Culture: No results for input(s): LABURIN in the last 48 hours.  Urine Studies:   No results for input(s): COLORU, APPEARANCEUA, PHUR, SPECGRAV, PROTEINUA, GLUCUA, KETONESU, BILIRUBINUA, OCCULTUA, NITRITE, UROBILINOGEN, LEUKOCYTESUR, RBCUA, WBCUA, BACTERIA, SQUAMEPITHEL, HYALINECASTS in the last 48 hours.    Invalid input(s): WRIGHTSUR  B12, folate and TSH wnl     Pending: B1, B2, RPR    All pertinent lab results from the past 24 hours have been reviewed.    Routine EEG: Diffuse  slowing suggestive of moderate encephalopathy.    Significant Imaging: I have reviewed and interpreted all pertinent imaging results/findings within the past 24 hours.     MRI brain W WO contrast (demyelinating protocol) 1/9/19:   In this patient with a given history of multiple sclerosis there is a grossly stable appearance of diffuse demyelinating plaques, with a new focus of enhancement involving the left caudate head, suggesting a focus of active demyelination. No evidence of acute infarct or hemorrhage.

## 2019-01-11 NOTE — PROGRESS NOTES
[facility]  Neurology  Progress Note    Patient Name: Vicente Luciano  MRN: [id number]  Admission Date: 1/8/2019  Hospital Length of Stay: 0 days  Code Status: Full Code   Attending Provider: Sheryl Manuel MD  Primary Care Physician: MINGO Garvin MD   Principal Problem:Acute metabolic encephalopathy      Subjective:     Interval History: No events overnight. He is reportedly back to baseline per family, no family at bedside this morning. He denies any issues.    Current Facility-Administered Medications   Medication Dose Route Frequency Provider Last Rate Last Dose    acetaminophen tablet 1,000 mg  1,000 mg Oral Q8H PRN Jose Anguiano, PA-C   1,000 mg at 01/10/19 1358    acetaminophen tablet 650 mg  650 mg Oral Q4H PRN Jose Anguiano, PA-C   650 mg at 01/10/19 0105    albuterol-ipratropium 2.5 mg-0.5 mg/3 mL nebulizer solution 3 mL  3 mL Nebulization Q4H PRN GREG Alexandre-C        bisacodyl suppository 10 mg  10 mg Rectal Daily PRN Jose Anguiano, PA-C        budesonide-formoterol 160-4.5 mcg inhaler 2 puff  2 puff Inhalation Q12H Jose Anguiano PA-C        butalbital-acetaminophen-caffeine -40 mg per tablet 1 tablet  1 tablet Oral Q6H PRN Jose Anguiano PA-C        clopidogrel tablet 75 mg  75 mg Oral Daily Jose Anguiano PA-C   75 mg at 01/11/19 0931    dextrose 50% injection 12.5 g  12.5 g Intravenous PRN Jose Anguiano, PA-C        dextrose 50% injection 25 g  25 g Intravenous PRN Jose Anguiano, PA-C        glucagon (human recombinant) injection 1 mg  1 mg Intramuscular PRN Jose Anguiano, PA-C        glucose chewable tablet 16 g  16 g Oral PRN Jose Anguiano, PA-C        glucose chewable tablet 24 g  24 g Oral PRN Jose Anguiano, PA-C        heparin (porcine) injection 5,000 Units  5,000 Units Subcutaneous Q8H Jose Anguiano, PA-C   5,000 Units at 01/11/19 0556    hydrALAZINE tablet 25 mg  25 mg Oral Q8H PRN Jose Anguiano PA-C   25 mg at 01/09/19 0202    insulin aspart  U-100 pen 0-5 Units  0-5 Units Subcutaneous QID (AC + HS) PRN Jose Anguiano PA-C   2 Units at 01/10/19 1812    insulin aspart U-100 pen 10 Units  10 Units Subcutaneous TIDWM Sheryl Manuel MD   10 Units at 01/11/19 0930    insulin detemir U-100 pen 30 Units  30 Units Subcutaneous QHS Sheryl Manuel MD   30 Units at 01/10/19 2158    losartan tablet 50 mg  50 mg Oral Daily Jose Anguiano PA-C   50 mg at 01/11/19 0929    metoprolol succinate (TOPROL-XL) 24 hr tablet 50 mg  50 mg Oral Daily Jose Anguiano PA-C   50 mg at 01/10/19 0945    OLANZapine tablet 5 mg  5 mg Oral TID PRN Sheryl Manuel MD        ondansetron disintegrating tablet 8 mg  8 mg Oral Q8H PRN Jose Anguiano PA-C        polyethylene glycol packet 17 g  17 g Oral Daily Jose Anguiano PA-C   17 g at 01/11/19 0929    ramelteon tablet 8 mg  8 mg Oral Nightly PRN Jose Anguiano PA-C   8 mg at 01/09/19 1405    sertraline tablet 100 mg  100 mg Oral Daily Jose Anguiano PA-C   100 mg at 01/11/19 0929    simvastatin tablet 80 mg  80 mg Oral QHS Jose Anguiano PA-C   80 mg at 01/09/19 0213    sodium chloride 0.9% flush 5 mL  5 mL Intravenous PRN Jose Anguiano PA-C         Review of Systems   Gastrointestinal: Negative for nausea and vomiting.   Neurological: Negative for dizziness, tremors, facial asymmetry, speech difficulty and headaches.   Psychiatric/Behavioral: Negative for agitation, behavioral problems and hallucinations. The patient is not hyperactive.      Objective:     Vital Signs (Most Recent):  Temp: 98 °F (36.7 °C) (01/11/19 0926)  Pulse: 78 (01/11/19 0926)  Resp: 19 (01/11/19 0926)  BP: 127/69 (01/11/19 0926)  SpO2: 96 % (01/11/19 0926) Vital Signs (24h Range):  Temp:  [97.6 °F (36.4 °C)-98.7 °F (37.1 °C)] 98 °F (36.7 °C)  Pulse:  [71-80] 78  Resp:  [18-19] 19  SpO2:  [92 %-96 %] 96 %  BP: (104-139)/(63-81) 127/69     Weight: 104.3 kg (230 lb)  Body mass index is 30.34 kg/m².    Physical Exam   Constitutional: He appears  well-developed and well-nourished.   Eyes: EOM are normal.   Neurological: He has a normal Finger-Nose-Finger Test.   Psychiatric: His speech is normal.   Nursing note and vitals reviewed.    NEUROLOGICAL EXAMINATION:     MENTAL STATUS   Oriented to person.   Oriented to city. (Oriented to hospital)  Disoriented to year. Oriented to month. (Says it's 2020)  Follows 2 step commands.   Speech: speech is normal   Level of consciousness: alert       Knows he is in the hospital for high blood sugar and confusion     CRANIAL NERVES     CN III, IV, VI   Extraocular motions are normal.   Nystagmus: none   Ophthalmoparesis: none    CN V   Facial sensation intact.     CN VII   Facial expression full, symmetric.     CN VIII   Hearing: intact    MOTOR EXAM   Muscle bulk: normal  Overall muscle tone: normal  Right arm pronator drift: absent  Left arm pronator drift: absent    Strength   Right biceps: 5/5  Left biceps: 5/5  Right triceps: 5/5  Left triceps: 5/5  Right interossei: 5/5  Left interossei: 5/5  Right iliopsoas: 5/5  Left iliopsoas: 5/5    SENSORY EXAM   Light touch normal.     GAIT AND COORDINATION      Coordination   Finger to nose coordination: normal    Tremor   Resting tremor: absent  Intention tremor: absent    Significant Labs:   Blood Culture: No results for input(s): LABBLOO in the last 48 hours.  CBC:   Recent Labs   Lab 01/10/19  0640 01/11/19  0425   WBC 9.89 6.57   HGB 15.7 15.2   HCT 45.6 44.9    160     CMP:   Recent Labs   Lab 01/10/19  0640 01/11/19  0425   * 95    137   K 4.0 3.9    106   CO2 20* 20*   BUN 24* 31*   CREATININE 1.5* 1.3   CALCIUM 9.6 8.9   MG 2.0 2.1   ALBUMIN 3.7 3.6   ANIONGAP 12 11   EGFRNONAA 46.2* 54.9*     Inflammatory Markers: No results for input(s): SEDRATE, CRP, PROCAL in the last 48 hours.  Urine Culture: No results for input(s): LABURIN in the last 48 hours.  Urine Studies:   No results for input(s): COLORU, APPEARANCEUA, PHUR, SPECGRAV, PROTEINUA,  GLUCUA, KETONESU, BILIRUBINUA, OCCULTUA, NITRITE, UROBILINOGEN, LEUKOCYTESUR, RBCUA, WBCUA, BACTERIA, SQUAMEPITHEL, HYALINECASTS in the last 48 hours.    Invalid input(s): WRIGHTSUR  B12, folate and TSH wnl     Pending: B1, B2, RPR    All pertinent lab results from the past 24 hours have been reviewed.    Routine EEG: Diffuse slowing suggestive of moderate encephalopathy.    Significant Imaging: I have reviewed and interpreted all pertinent imaging results/findings within the past 24 hours.     MRI brain W WO contrast (demyelinating protocol) 1/9/19:   In this patient with a given history of multiple sclerosis there is a grossly stable appearance of diffuse demyelinating plaques, with a new focus of enhancement involving the left caudate head, suggesting a focus of active demyelination. No evidence of acute infarct or hemorrhage.          Assessment and Plan:     * Acute metabolic encephalopathy    Bizarre behavior and nonsensical speech began evening of 1/8 after missing doses of SQ insulin earlier in the day. Luiza marques called at beginning of admission when he did not want to stay in the bed and he was placed in restraints with zyprexa prn.     1/9-not fully oriented and difficulty following multi-step commands  1/10-more awake and interactive, able to hold a conversation and says he remembers feeling confused yesterday. Wife and daughter not at bedside to say if he is at cognitive baseline.   1/11-reportedly back to baseline, only disoriented to year    Suspect AMS was due to significant hyperglycemia (547) since mental status has improved when glucose normalized. Routine EEG with diffuse slowing, negative for epileptiform activity. B12 and TSH within normal limits.    Recommendations:  -- Follow up B1/B2 vitamins   -- continue delirium precautions with regulation of sleep/wake, limit sedating medications as able  -- continue correction of metabolic derangements per primary team  -- General neurology will sign  off, patient ok for discharge home from neurologic perspective. Please call with any questions or concerns.     Hyponatremia    On initial labs and felt to be pseudohyponatremia in setting of hyperglycemia    Resolved     Type 2 diabetes mellitus with hyperglycemia, with long-term current use of insulin    Glucose significantly improved from admission  --continue management per primary team     MS (multiple sclerosis)    Dx 1990s, [facility] and per primary team, on Avonex  Pt unable to name Neurologist and does not know when he was last seen    MRI with moderate disease burden and one new focus of active demyelination in left caudate head  -- after discussion with MS team, do not recommend treated as lesion is asymptomatic      Essential hypertension    Management per primary team         VTE Risk Mitigation (From admission, onward)        Ordered     heparin (porcine) injection 5,000 Units  Every 8 hours      01/09/19 0051     IP VTE HIGH RISK PATIENT  Once      01/09/19 0051          Amie Centeno MD  Neurology  [facility]

## 2019-01-11 NOTE — ASSESSMENT & PLAN NOTE
Bizarre behavior and nonsensical speech began evening of 1/8 after missing doses of SQ insulin earlier in the day. Luiza marques called at beginning of admission when he did not want to stay in the bed and he was placed in restraints with zyprexa prn.     1/9-not fully oriented and difficulty following multi-step commands  1/10-more awake and interactive, able to hold a conversation and says he remembers feeling confused yesterday. Wife and daughter not at bedside to say if he is at cognitive baseline.   1/11-reportedly back to baseline, only disoriented to year    Suspect AMS was due to significant hyperglycemia (547) since mental status has improved when glucose normalized. Routine EEG with diffuse slowing, negative for epileptiform activity. B12 and TSH within normal limits.    Recommendations:  -- Follow up B1/B2 vitamins   -- continue delirium precautions with regulation of sleep/wake, limit sedating medications as able  -- continue correction of metabolic derangements per primary team  -- General neurology will sign off, patient ok for discharge home from neurologic perspective. Please call with any questions or concerns.

## 2019-01-12 LAB
ALBUMIN SERPL BCP-MCNC: 3.4 G/DL
ANION GAP SERPL CALC-SCNC: 9 MMOL/L
BASOPHILS # BLD AUTO: 0.02 K/UL
BASOPHILS NFR BLD: 0.4 %
BUN SERPL-MCNC: 29 MG/DL
CALCIUM SERPL-MCNC: 9 MG/DL
CHLORIDE SERPL-SCNC: 108 MMOL/L
CO2 SERPL-SCNC: 23 MMOL/L
CREAT SERPL-MCNC: 1.2 MG/DL
DIFFERENTIAL METHOD: NORMAL
EOSINOPHIL # BLD AUTO: 0.1 K/UL
EOSINOPHIL NFR BLD: 1.8 %
ERYTHROCYTE [DISTWIDTH] IN BLOOD BY AUTOMATED COUNT: 12.6 %
EST. GFR  (AFRICAN AMERICAN): >60 ML/MIN/1.73 M^2
EST. GFR  (NON AFRICAN AMERICAN): >60 ML/MIN/1.73 M^2
GLUCOSE SERPL-MCNC: 97 MG/DL
HCT VFR BLD AUTO: 42.4 %
HGB BLD-MCNC: 14.6 G/DL
IMM GRANULOCYTES # BLD AUTO: 0.01 K/UL
IMM GRANULOCYTES NFR BLD AUTO: 0.2 %
LYMPHOCYTES # BLD AUTO: 1.7 K/UL
LYMPHOCYTES NFR BLD: 34.3 %
MAGNESIUM SERPL-MCNC: 2.3 MG/DL
MCH RBC QN AUTO: 30.2 PG
MCHC RBC AUTO-ENTMCNC: 34.4 G/DL
MCV RBC AUTO: 88 FL
MONOCYTES # BLD AUTO: 0.5 K/UL
MONOCYTES NFR BLD: 9.2 %
NEUTROPHILS # BLD AUTO: 2.7 K/UL
NEUTROPHILS NFR BLD: 54.1 %
NRBC BLD-RTO: 0 /100 WBC
PHOSPHATE SERPL-MCNC: 3.1 MG/DL
PLATELET # BLD AUTO: 162 K/UL
PMV BLD AUTO: 10.9 FL
POCT GLUCOSE: 151 MG/DL (ref 70–110)
POCT GLUCOSE: 177 MG/DL (ref 70–110)
POCT GLUCOSE: 95 MG/DL (ref 70–110)
POTASSIUM SERPL-SCNC: 3.7 MMOL/L
RBC # BLD AUTO: 4.83 M/UL
SODIUM SERPL-SCNC: 140 MMOL/L
WBC # BLD AUTO: 4.9 K/UL

## 2019-01-12 PROCEDURE — 83735 ASSAY OF MAGNESIUM: CPT

## 2019-01-12 PROCEDURE — 99225 PR SUBSEQUENT OBSERVATION CARE,LEVEL II: ICD-10-PCS | Mod: ,,, | Performed by: INTERNAL MEDICINE

## 2019-01-12 PROCEDURE — G0378 HOSPITAL OBSERVATION PER HR: HCPCS

## 2019-01-12 PROCEDURE — 25000003 PHARM REV CODE 250: Performed by: PHYSICIAN ASSISTANT

## 2019-01-12 PROCEDURE — 80069 RENAL FUNCTION PANEL: CPT

## 2019-01-12 PROCEDURE — 36415 COLL VENOUS BLD VENIPUNCTURE: CPT

## 2019-01-12 PROCEDURE — 63600175 PHARM REV CODE 636 W HCPCS: Performed by: INTERNAL MEDICINE

## 2019-01-12 PROCEDURE — 99225 PR SUBSEQUENT OBSERVATION CARE,LEVEL II: CPT | Mod: ,,, | Performed by: INTERNAL MEDICINE

## 2019-01-12 PROCEDURE — 25000003 PHARM REV CODE 250: Performed by: INTERNAL MEDICINE

## 2019-01-12 PROCEDURE — 85025 COMPLETE CBC W/AUTO DIFF WBC: CPT

## 2019-01-12 PROCEDURE — 63600175 PHARM REV CODE 636 W HCPCS: Performed by: PHYSICIAN ASSISTANT

## 2019-01-12 RX ORDER — RIBOFLAVIN (VITAMIN B2) 100 MG
100 TABLET ORAL DAILY
Status: DISCONTINUED | OUTPATIENT
Start: 2019-01-12 | End: 2019-01-14

## 2019-01-12 RX ADMIN — INSULIN DETEMIR 30 UNITS: 100 INJECTION, SOLUTION SUBCUTANEOUS at 08:01

## 2019-01-12 RX ADMIN — LOSARTAN POTASSIUM 50 MG: 50 TABLET, FILM COATED ORAL at 09:01

## 2019-01-12 RX ADMIN — BUDESONIDE AND FORMOTEROL FUMARATE DIHYDRATE 2 PUFF: 160; 4.5 AEROSOL RESPIRATORY (INHALATION) at 09:01

## 2019-01-12 RX ADMIN — INSULIN ASPART 10 UNITS: 100 INJECTION, SOLUTION INTRAVENOUS; SUBCUTANEOUS at 06:01

## 2019-01-12 RX ADMIN — SERTRALINE 100 MG: 100 TABLET, FILM COATED ORAL at 09:01

## 2019-01-12 RX ADMIN — METOPROLOL SUCCINATE 50 MG: 50 TABLET, EXTENDED RELEASE ORAL at 09:01

## 2019-01-12 RX ADMIN — INSULIN ASPART 10 UNITS: 100 INJECTION, SOLUTION INTRAVENOUS; SUBCUTANEOUS at 12:01

## 2019-01-12 RX ADMIN — THIAMINE HYDROCHLORIDE 100 MG: 100 INJECTION, SOLUTION INTRAMUSCULAR; INTRAVENOUS at 03:01

## 2019-01-12 RX ADMIN — INSULIN ASPART 10 UNITS: 100 INJECTION, SOLUTION INTRAVENOUS; SUBCUTANEOUS at 09:01

## 2019-01-12 RX ADMIN — HEPARIN SODIUM 5000 UNITS: 5000 INJECTION, SOLUTION INTRAVENOUS; SUBCUTANEOUS at 03:01

## 2019-01-12 RX ADMIN — HEPARIN SODIUM 5000 UNITS: 5000 INJECTION, SOLUTION INTRAVENOUS; SUBCUTANEOUS at 08:01

## 2019-01-12 RX ADMIN — SIMVASTATIN 80 MG: 20 TABLET, FILM COATED ORAL at 08:01

## 2019-01-12 RX ADMIN — Medication 100 MG: at 03:01

## 2019-01-12 RX ADMIN — HEPARIN SODIUM 5000 UNITS: 5000 INJECTION, SOLUTION INTRAVENOUS; SUBCUTANEOUS at 06:01

## 2019-01-12 RX ADMIN — CLOPIDOGREL 75 MG: 75 TABLET, FILM COATED ORAL at 09:01

## 2019-01-12 RX ADMIN — POLYETHYLENE GLYCOL 3350 17 G: 17 POWDER, FOR SOLUTION ORAL at 09:01

## 2019-01-12 RX ADMIN — RAMELTEON 8 MG: 8 TABLET, FILM COATED ORAL at 08:01

## 2019-01-12 NOTE — PROGRESS NOTES
Physician Attestation for Scribe:  I, Sheryl Manuel MD, personally performed the services described in this documentation. All medical record entries made by the scribe were at my direction and in my presence.  I have reviewed this note and agree that the record reflects my personal performance and is accurate and complete.     Hospital Medicine  Progress Note     Patient Name: Vicente Luciano  MRN: [id number]  [place] [facility] [place] [facility] [place] Sheryl Manuel MD   Admit Date: 1/8/2019  NEDRA 1/15/2019  Code status: Full Code     Principal Problem:  Acute metabolic encephalopathy     Interval history: Patient with no new complaints.     Review of Systems   Constitutional: Negative for fever.   Respiratory: Negative for shortness of breath.          Physical Exam:  Temp:  [97.6 °F (36.4 °C)-98.7 °F (37.1 °C)]   Pulse:  [71-80]   Resp:  [18]   BP: (104-139)/(63-81)   SpO2:  [92 %-94 %]       Temp: 98.6 °F (37 °C) (01/11/19 0552)  Pulse: 71 (01/11/19 0552)  Resp: 18 (01/10/19 1642)  BP: 118/81 (01/11/19 0552)  SpO2: (!) 94 % (01/11/19 0552)  No intake or output data in the 24 hours ending 01/11/19 0844  Weight: 104.3 kg (230 lb)  Body mass index is 30.34 kg/m².     Physical Exam   Constitutional: No distress.   Eyes: Conjunctivae and lids are normal.   Cardiovascular: S1 normal and S2 normal.   Pulmonary/Chest: Effort normal and breath sounds normal.   Abdominal: Soft. Bowel sounds are normal. There is no tenderness.   Musculoskeletal: He exhibits no edema.   Neurological: He is alert.   Psychiatric: Affect normal.         Significant Labs:         Recent Labs   Lab 01/08/19  2007 01/09/19  0530 01/10/19  0640 01/11/19  0425   WBC 6.98 7.82 9.89 6.57   HGB 16.3 14.5 15.7 15.2   HCT 46.9 42.7 45.6 44.9    156 185 160             Recent Labs   Lab 01/08/19 2022 01/09/19 0529 01/10/19  0640 01/11/19  0425   * 134* 137 137   K 4.7 4.3 4.0 3.9   CL 97 99 105 106   CO2 24 25 20* 20*   BUN 16 15 24* 31*   CREATININE 1.5*  1.4 1.5* 1.3   * 547* 169* 95   CALCIUM 9.2 9.1 9.6 8.9   MG 2.2 2.0 2.0 2.1   PHOS  --  3.0 4.2 3.7   ALKPHOS 122  --   --   --    ALT 30  --   --   --    AST 17  --   --   --    ALBUMIN 4.1  --  3.7 3.6   PROT 7.3  --   --   --    BILITOT 0.5  --   --   --    INR 1.0  --   --   --    LIPASE 25  --   --   --                Recent Labs   Lab 01/10/19  0752 01/10/19  1159 01/10/19  1810 01/10/19  2202 01/11/19  0555 01/11/19  1133   POCTGLUCOSE 147* 161* 222* 107 102 204*      A1C:        Recent Labs   Lab 01/08/19 2007 01/09/19  0529   HGBA1C 10.3* 10.5*          Recent Labs   Lab 01/08/19 2022   TROPONINI 0.007         Inpatient Medications prescribed for management of current Problems:   Scheduled Meds:    budesonide-formoterol 160-4.5 mcg  2 puff Inhalation Q12H    clopidogrel  75 mg Oral Daily    heparin (porcine)  5,000 Units Subcutaneous Q8H    insulin aspart U-100  10 Units Subcutaneous TIDWM    insulin detemir U-100  30 Units Subcutaneous QHS    losartan  50 mg Oral Daily    metoprolol succinate  50 mg Oral Daily    polyethylene glycol  17 g Oral Daily    sertraline  100 mg Oral Daily    simvastatin  80 mg Oral QHS      Continuous Infusions:   As Needed: acetaminophen, acetaminophen, albuterol-ipratropium, bisacodyl, butalbital-acetaminophen-caffeine -40 mg, dextrose 50%, dextrose 50%, glucagon (human recombinant), glucose, glucose, hydrALAZINE, insulin aspart U-100, OLANZapine, ondansetron, ramelteon, sodium chloride 0.9%            Active Hospital Problems     Diagnosis   POA    *Acute metabolic encephalopathy [G93.41]   Yes    Chronic kidney disease, stage III (moderate) [N18.3]   Yes    Hyponatremia [E87.1]   Yes    Type 2 diabetes mellitus with hyperglycemia, with long-term current use of insulin [E11.65, Z79.4]   Not Applicable    CAD (coronary artery disease) [I25.10]   Yes       PTCA/stent 2010       Essential hypertension [I10]   Yes       1987       MS (multiple  sclerosis) [G35]   Yes       1990       COPD (chronic obstructive pulmonary disease) with chronic bronchitis [J44.9]   Yes       Resolved Hospital Problems   No resolved problems to display.         Overview:    72 yo male on a background significant for MS, CAD, T2DM, HTN & COPD who is admitted for acute metabolic encephalopathy. Drug screen, UA, CXR were unremarkable, but BG >400 on presentation. CTH did not reveal any acute findings, but MRI was suggestive of active demyelination involving the left caudate head; neurology was consulted. These findings were not believed to contribute to his AMS and further work up was pursued. An EEG (1/10) indicated moderate encephalopathy. His mental status returned to baseline, as his BGs improved.      Assessment and Plan for Problems addressed today:     Acute metabolic encephalopathy  MS (multiple sclerosis)  · AMS likely multifactorial in setting of hyperglycemia, uncontrolled HTN and active MS.  · Received olanzapine x1 in ED. Continued home sertraline.   · Drug screen negative. UA & CXR unremarkable. 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 MRI: suggestive of active demyelination involving left caudate head.   · Consulted Neurology; Ordered B1, B2, folate and routine EEG. (1/9)  · B12 & TSH wnl, RPR non-reactive. EEG suggesting moderate encephalopathy. (1/10)  · Appears to be back to baseline.      Type 2 diabetes mellitus with hyperglycemia, with long-term current use of insulin  DMII with hypertension  · BG >400 on presentation. Received 1L LR & 26U detemir in ED.  · A1c (1/9): 10.5%. Home regimen: glipizide 5mg, linagliptin 5mg, metformin 1000mg; holding while admitted.    · Continue home medications: metoprolol succinate 50 mg daily, changed valsartan 160 mg to losartan 50 mg daily; hydralazine PRN.  · Initiated basal, prandial and low-dose correction insulin.  · Adjusting insulin doses. (1/9)   · BG has improved.  · Plan to resume home antihyperglycemics if  feasible with initiation of long-acting insulin. [facility] (1/11)      Chronic kidney disease, stage III (moderate)  · Stable. Baseline sCr: ~1.3.     CAD (coronary artery disease)  Hyperlipidemia   · EKG & troponin negative for ischemia   · Continued home clopidogrel and simvastatin.      COPD (chronic obstructive pulmonary disease) with chronic bronchitis  · Continued budesonide-formoterol inhaler and PRN Duonebs.      Hyponatremia, resolving  · In setting of hyperglycemia; improved with BG correction and IVF.       Diet: Diet diabetic [facility]; 2000 Calorie; Cardiac (Low Na/Chol)     DVT Prophylaxis:         Anticoagulants   Medication Route Frequency    heparin (porcine) injection 5,000 Units Subcutaneous Q8H         L/D/A:  PIV     Discharge plan and follow up  Home or Self Care        Provider  Sheryl Manuel MD  [facility] [place] [facility] MED D   Department of Hospital Medicine     Sheng Attestation: I personally scribed for Sheryl Manuel MD on 01/11/2019 at 9:29 AM. Electronically signed by sheng Valdez on 01/11/2019 at 9:29 AM.

## 2019-01-12 NOTE — MEDICAL/APP STUDENT
"Hospital Medicine  Progress Note    Patient Name: Vicente Luciano  MRN: [id number]  [place]: [facility] [place] [facility] [place] Sheryl Manuel MD   Admit Date: 1/8/2019  NEDRA 1/15/2019  Code status: Full Code    Principal Problem:  Acute metabolic encephalopathy    Interval history: Patient with no new complaints. Per nursing, patient appears confused with which family agrees. Patient claims he's "fine" and wants to go home.     Review of Systems   Constitutional: Negative for fever.   Respiratory: Negative for shortness of breath.        Physical Exam:  Temp:  [97.2 °F (36.2 °C)-98.4 °F (36.9 °C)]   Pulse:  [65-78]   Resp:  [16-19]   BP: (126-170)/(69-79)   SpO2:  [95 %-97 %]      Temp: 97.6 °F (36.4 °C) (01/12/19 0409)  Pulse: 65 (01/12/19 0409)  Resp: 18 (01/12/19 0409)  BP: (!) 170/79 (01/12/19 0409)  SpO2: 96 % (01/12/19 0409)  No intake or output data in the 24 hours ending 01/12/19 0838  Weight: 104.3 kg (230 lb)  Body mass index is 30.34 kg/m².    Physical Exam   Constitutional: No distress.   Eyes: Conjunctivae and lids are normal.   Cardiovascular: S1 normal and S2 normal.   Pulmonary/Chest: Effort normal and breath sounds normal.   Abdominal: Soft. Bowel sounds are normal. There is no tenderness.   Musculoskeletal: He exhibits no edema.       Significant Labs:  Recent Labs   Lab 01/09/19  0530 01/10/19  0640 01/11/19  0425 01/12/19  0450   WBC 7.82 9.89 6.57 4.90   HGB 14.5 15.7 15.2 14.6   HCT 42.7 45.6 44.9 42.4    185 160 162     Recent Labs   Lab 01/08/19  2022  01/10/19  0640 01/11/19 0425 01/12/19  0450   *   < > 137 137 140   K 4.7   < > 4.0 3.9 3.7   CL 97   < > 105 106 108   CO2 24   < > 20* 20* 23   BUN 16   < > 24* 31* 29*   CREATININE 1.5*   < > 1.5* 1.3 1.2   *   < > 169* 95 97   CALCIUM 9.2   < > 9.6 8.9 9.0   MG 2.2   < > 2.0 2.1 2.3   PHOS  --    < > 4.2 3.7 3.1   ALKPHOS 122  --   --   --   --    ALT 30  --   --   --   --    AST 17  --   --   --   --    ALBUMIN 4.1  --  3.7 3.6 3.4*   PROT " 7.3  --   --   --   --    BILITOT 0.5  --   --   --   --    INR 1.0  --   --   --   --    LIPASE 25  --   --   --   --     < > = values in this interval not displayed.     Recent Labs   Lab 01/10/19  2202 01/11/19  0555 01/11/19  0757 01/11/19  1133 01/11/19  1721 01/11/19  2157   POCTGLUCOSE 107 102 103 204* 106 132*     A1C:   Recent Labs   Lab 01/08/19 2007 01/09/19  0529   HGBA1C 10.3* 10.5*     Recent Labs   Lab 01/08/19 2022   TROPONINI 0.007       Inpatient Medications prescribed for management of current Problems:   Scheduled Meds:    budesonide-formoterol 160-4.5 mcg  2 puff Inhalation Q12H    clopidogrel  75 mg Oral Daily    heparin (porcine)  5,000 Units Subcutaneous Q8H    insulin aspart U-100  10 Units Subcutaneous TIDWM    insulin detemir U-100  30 Units Subcutaneous QHS    losartan  50 mg Oral Daily    metoprolol succinate  50 mg Oral Daily    polyethylene glycol  17 g Oral Daily    sertraline  100 mg Oral Daily    simvastatin  80 mg Oral QHS     Continuous Infusions:   As Needed: acetaminophen, acetaminophen, albuterol-ipratropium, bisacodyl, butalbital-acetaminophen-caffeine -40 mg, dextrose 50%, dextrose 50%, glucagon (human recombinant), glucose, glucose, hydrALAZINE, insulin aspart U-100, OLANZapine, ondansetron, ramelteon, sodium chloride 0.9%    Active Hospital Problems    Diagnosis  POA    *Acute metabolic encephalopathy [G93.41]  Yes    Chronic kidney disease, stage III (moderate) [N18.3]  Yes    Hyponatremia [E87.1]  Yes    Type 2 diabetes mellitus with hyperglycemia, with long-term current use of insulin [E11.65, Z79.4]  Not Applicable    CAD (coronary artery disease) [I25.10]  Yes     PTCA/stent 2010      Essential hypertension [I10]  Yes     1987      MS (multiple sclerosis) [G35]  Yes     1990      COPD (chronic obstructive pulmonary disease) with chronic bronchitis [J44.9]  Yes      Resolved Hospital Problems   No resolved problems to display.       Overview:     72 yo male on a background significant for MS, CAD, T2DM, HTN & COPD who is admitted for acute metabolic encephalopathy. Drug screen, UA, CXR were unremarkable, but BG >400 on presentation. CTH did not reveal any acute findings, but MRI was suggestive of active demyelination involving the left caudate head; neurology was consulted. These findings were not believed to contribute to his AMS and further work up was pursued. An EEG (1/10) indicated moderate encephalopathy. His mental status improved as his BGs improved.     Assessment and Plan for Problems addressed today:    Acute metabolic encephalopathy  MS (multiple sclerosis)  · AMS likely multifactorial in setting of hyperglycemia, uncontrolled HTN and active MS.  · Received olanzapine x1 in ED. Continued home sertraline.   · Drug screen negative. UA & CXR unremarkable. 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 MRI: suggestive of active demyelination involving left caudate head.   · Consulted Neurology; Ordered B1, B2, folate and routine EEG. (1/9)  · B12 & TSH wnl, RPR non-reactive. EEG suggesting moderate encephalopathy. (1/10)  · Per nursing and family, patient is confused and not back to baseline. Ordered empiric B1 & B2 supplementation, pending results. (1/12)    Type 2 diabetes mellitus with hyperglycemia, with long-term current use of insulin  DMII with hypertension  · BG >400 on presentation. Received 1L LR & 26U detemir in ED.  · A1c (1/9): 10.5%. Home regimen: glipizide 5mg, linagliptin 5mg, metformin 1000mg; holding while admitted.    · Continue home medications: metoprolol succinate 50 mg daily, changed valsartan 160 mg to losartan 50 mg daily; hydralazine PRN.  · Initiated basal, prandial and low-dose correction insulin.  · Adjusting insulin doses. (1/9)   · BG has improved.  · Plan to resume home antihyperglycemics if feasible with initiation of long-acting insulin. [facility] (1/11)   · BGs are well  controlled with current regimen. (1/12)    Chronic kidney disease, stage III (moderate)  · Stable. Baseline sCr: ~1.3.    CAD (coronary artery disease)  Hyperlipidemia   · EKG & troponin negative for ischemia   · Continued home clopidogrel and simvastatin.     COPD (chronic obstructive pulmonary disease) with chronic bronchitis  · Continued budesonide-formoterol inhaler and PRN Duonebs.     Hyponatremia, resolving  · In setting of hyperglycemia; improved with BG correction and IVF.      Diet: Diet diabetic [facility]; 2000 Calorie; Cardiac (Low Na/Chol)    DVT Prophylaxis:   Anticoagulants   Medication Route Frequency    heparin (porcine) injection 5,000 Units Subcutaneous Q8H       L/D/A:  PIV    Discharge plan and follow up  Home or Self Care      Provider  Sheryl Manuel MD  [facility] [place] [facility] [place]   Department of Hospital Medicine    Scribe Attestation: I personally scribed for Sheryl Manuel MD on 01/12/2019 at 9:29 AM. Electronically signed by sheng Valdez on 01/12/2019 at 9:29 AM.

## 2019-01-12 NOTE — PLAN OF CARE
POC reviewed with patient. Pt verbalized understanding but remains confused to situation and has impulsive behavior. Pt ambulates to bathroom with standby assist. Vitals stable on room air. [employer] camera in room for extra surveillance. PRN insulin given for elevated BG. Questions and concerns addressed. No acute events overnight. Pt progressing toward goals. Will continue to monitor.  See flow sheets for full assessment and VS info

## 2019-01-13 LAB
ALBUMIN SERPL BCP-MCNC: 3.3 G/DL
ANION GAP SERPL CALC-SCNC: 8 MMOL/L
BASOPHILS # BLD AUTO: 0.04 K/UL
BASOPHILS NFR BLD: 0.8 %
BUN SERPL-MCNC: 18 MG/DL
CALCIUM SERPL-MCNC: 9.2 MG/DL
CHLORIDE SERPL-SCNC: 108 MMOL/L
CO2 SERPL-SCNC: 24 MMOL/L
CREAT SERPL-MCNC: 1.1 MG/DL
DIFFERENTIAL METHOD: NORMAL
EOSINOPHIL # BLD AUTO: 0.1 K/UL
EOSINOPHIL NFR BLD: 1.8 %
ERYTHROCYTE [DISTWIDTH] IN BLOOD BY AUTOMATED COUNT: 12.5 %
EST. GFR  (AFRICAN AMERICAN): >60 ML/MIN/1.73 M^2
EST. GFR  (NON AFRICAN AMERICAN): >60 ML/MIN/1.73 M^2
GLUCOSE SERPL-MCNC: 137 MG/DL
HCT VFR BLD AUTO: 45.1 %
HGB BLD-MCNC: 14.9 G/DL
IMM GRANULOCYTES # BLD AUTO: 0.02 K/UL
IMM GRANULOCYTES NFR BLD AUTO: 0.4 %
LYMPHOCYTES # BLD AUTO: 1.6 K/UL
LYMPHOCYTES NFR BLD: 31.5 %
MAGNESIUM SERPL-MCNC: 2.1 MG/DL
MCH RBC QN AUTO: 29.3 PG
MCHC RBC AUTO-ENTMCNC: 33 G/DL
MCV RBC AUTO: 89 FL
MONOCYTES # BLD AUTO: 0.5 K/UL
MONOCYTES NFR BLD: 9.3 %
NEUTROPHILS # BLD AUTO: 2.8 K/UL
NEUTROPHILS NFR BLD: 56.2 %
NRBC BLD-RTO: 0 /100 WBC
PHOSPHATE SERPL-MCNC: 3.2 MG/DL
PLATELET # BLD AUTO: 164 K/UL
PMV BLD AUTO: 10.6 FL
POCT GLUCOSE: 117 MG/DL (ref 70–110)
POCT GLUCOSE: 133 MG/DL (ref 70–110)
POCT GLUCOSE: 168 MG/DL (ref 70–110)
POCT GLUCOSE: 211 MG/DL (ref 70–110)
POCT GLUCOSE: 84 MG/DL (ref 70–110)
POCT GLUCOSE: 88 MG/DL (ref 70–110)
POTASSIUM SERPL-SCNC: 4.4 MMOL/L
RBC # BLD AUTO: 5.08 M/UL
SODIUM SERPL-SCNC: 140 MMOL/L
VIT B2 SERPL-MCNC: 5 MCG/L (ref 1–19)
WBC # BLD AUTO: 4.96 K/UL

## 2019-01-13 PROCEDURE — 63600175 PHARM REV CODE 636 W HCPCS: Performed by: PHYSICIAN ASSISTANT

## 2019-01-13 PROCEDURE — 99224 PR SUBSEQUENT OBSERVATION CARE,LEVEL I: ICD-10-PCS | Mod: ,,, | Performed by: INTERNAL MEDICINE

## 2019-01-13 PROCEDURE — S5571 INSULIN DISPOS PEN 3 ML: HCPCS | Performed by: INTERNAL MEDICINE

## 2019-01-13 PROCEDURE — 25000003 PHARM REV CODE 250: Performed by: PHYSICIAN ASSISTANT

## 2019-01-13 PROCEDURE — 63600175 PHARM REV CODE 636 W HCPCS: Performed by: INTERNAL MEDICINE

## 2019-01-13 PROCEDURE — G0378 HOSPITAL OBSERVATION PER HR: HCPCS

## 2019-01-13 PROCEDURE — 85025 COMPLETE CBC W/AUTO DIFF WBC: CPT

## 2019-01-13 PROCEDURE — 80069 RENAL FUNCTION PANEL: CPT

## 2019-01-13 PROCEDURE — 25000003 PHARM REV CODE 250: Performed by: INTERNAL MEDICINE

## 2019-01-13 PROCEDURE — 83735 ASSAY OF MAGNESIUM: CPT

## 2019-01-13 PROCEDURE — 99224 PR SUBSEQUENT OBSERVATION CARE,LEVEL I: CPT | Mod: ,,, | Performed by: INTERNAL MEDICINE

## 2019-01-13 PROCEDURE — 36415 COLL VENOUS BLD VENIPUNCTURE: CPT

## 2019-01-13 RX ADMIN — METOPROLOL SUCCINATE 50 MG: 50 TABLET, EXTENDED RELEASE ORAL at 08:01

## 2019-01-13 RX ADMIN — SERTRALINE 100 MG: 100 TABLET, FILM COATED ORAL at 08:01

## 2019-01-13 RX ADMIN — LOSARTAN POTASSIUM 50 MG: 50 TABLET, FILM COATED ORAL at 08:01

## 2019-01-13 RX ADMIN — POLYETHYLENE GLYCOL 3350 17 G: 17 POWDER, FOR SOLUTION ORAL at 08:01

## 2019-01-13 RX ADMIN — HEPARIN SODIUM 5000 UNITS: 5000 INJECTION, SOLUTION INTRAVENOUS; SUBCUTANEOUS at 02:01

## 2019-01-13 RX ADMIN — INSULIN ASPART 2 UNITS: 100 INJECTION, SOLUTION INTRAVENOUS; SUBCUTANEOUS at 11:01

## 2019-01-13 RX ADMIN — SIMVASTATIN 80 MG: 20 TABLET, FILM COATED ORAL at 08:01

## 2019-01-13 RX ADMIN — HEPARIN SODIUM 5000 UNITS: 5000 INJECTION, SOLUTION INTRAVENOUS; SUBCUTANEOUS at 09:01

## 2019-01-13 RX ADMIN — BUDESONIDE AND FORMOTEROL FUMARATE DIHYDRATE 2 PUFF: 160; 4.5 AEROSOL RESPIRATORY (INHALATION) at 08:01

## 2019-01-13 RX ADMIN — BUDESONIDE AND FORMOTEROL FUMARATE DIHYDRATE 2 PUFF: 160; 4.5 AEROSOL RESPIRATORY (INHALATION) at 09:01

## 2019-01-13 RX ADMIN — INSULIN ASPART 10 UNITS: 100 INJECTION, SOLUTION INTRAVENOUS; SUBCUTANEOUS at 05:01

## 2019-01-13 RX ADMIN — INSULIN ASPART 10 UNITS: 100 INJECTION, SOLUTION INTRAVENOUS; SUBCUTANEOUS at 04:01

## 2019-01-13 RX ADMIN — CLOPIDOGREL 75 MG: 75 TABLET, FILM COATED ORAL at 08:01

## 2019-01-13 RX ADMIN — INSULIN DETEMIR 30 UNITS: 100 INJECTION, SOLUTION SUBCUTANEOUS at 08:01

## 2019-01-13 RX ADMIN — INSULIN ASPART 10 UNITS: 100 INJECTION, SOLUTION INTRAVENOUS; SUBCUTANEOUS at 11:01

## 2019-01-13 RX ADMIN — Medication 100 MG: at 08:01

## 2019-01-13 RX ADMIN — THIAMINE HYDROCHLORIDE 100 MG: 100 INJECTION, SOLUTION INTRAMUSCULAR; INTRAVENOUS at 02:01

## 2019-01-13 RX ADMIN — HEPARIN SODIUM 5000 UNITS: 5000 INJECTION, SOLUTION INTRAVENOUS; SUBCUTANEOUS at 05:01

## 2019-01-13 NOTE — MEDICAL/APP STUDENT
Hospital Medicine  Progress Note    Patient Name: Vicente Luciano  MRN: [id number]  Team: [facility] [place] [facility] MED D Sheryl Manuel MD   Admit Date: 1/8/2019  NEDRA 1/15/2019  Code status: Full Code    Principal Problem:  Acute metabolic encephalopathy    Interval history: No reported complaints or events. Improving per wife at bedside. Reports poor adherence to diet at home.    Review of Systems   Constitutional: Negative for fever.   Respiratory: Negative for shortness of breath.        Physical Exam:  Temp:  [97 °F (36.1 °C)-99.6 °F (37.6 °C)]   Pulse:  [61-85]   Resp:  [16-18]   BP: (123-167)/(72-88)   SpO2:  [93 %-99 %]      Temp: 98.6 °F (37 °C) (01/13/19 0803)  Pulse: 61 (01/13/19 0803)  Resp: 16 (01/13/19 0803)  BP: 123/72 (01/13/19 0803)  SpO2: 96 % (01/13/19 0803)    Intake/Output Summary (Last 24 hours) at 1/13/2019 0835  Last data filed at 1/13/2019 0615  Gross per 24 hour   Intake --   Output 700 ml   Net -700 ml     Weight: 104.3 kg (229 lb 16 oz)  Body mass index is 30.34 kg/m².    Physical Exam   Constitutional: No distress.   Eyes: Conjunctivae and lids are normal.   Cardiovascular: S1 normal and S2 normal.   Pulmonary/Chest: Effort normal and breath sounds normal.   Abdominal: Soft. Bowel sounds are normal. There is no tenderness.   Musculoskeletal: He exhibits no edema.       Significant Labs:  Recent Labs   Lab 01/10/19  0640 01/11/19 0425 01/12/19 0450 01/13/19  0352   WBC 9.89 6.57 4.90 4.96   HGB 15.7 15.2 14.6 14.9   HCT 45.6 44.9 42.4 45.1    160 162 164     Recent Labs   Lab 01/08/19 2022 01/11/19 0425 01/12/19  0450 01/13/19  0352   *   < > 137 140 140   K 4.7   < > 3.9 3.7 4.4   CL 97   < > 106 108 108   CO2 24   < > 20* 23 24   BUN 16   < > 31* 29* 18   CREATININE 1.5*   < > 1.3 1.2 1.1   *   < > 95 97 137*   CALCIUM 9.2   < > 8.9 9.0 9.2   MG 2.2   < > 2.1 2.3 2.1   PHOS  --    < > 3.7 3.1 3.2   ALKPHOS 122  --   --   --   --    ALT 30  --   --   --   --    AST 17  --   --    --   --    ALBUMIN 4.1   < > 3.6 3.4* 3.3*   PROT 7.3  --   --   --   --    BILITOT 0.5  --   --   --   --    INR 1.0  --   --   --   --    LIPASE 25  --   --   --   --     < > = values in this interval not displayed.     Recent Labs   Lab 01/11/19  2157 01/12/19  0803 01/12/19  1200 01/12/19  1715 01/13/19  0106 01/13/19  0549   POCTGLUCOSE 132* 95 177* 151* 168* 133*     A1C:   Recent Labs   Lab 01/08/19 2007 01/09/19  0529   HGBA1C 10.3* 10.5*     Recent Labs   Lab 01/08/19 2022   TROPONINI 0.007       Inpatient Medications prescribed for management of current Problems:   Scheduled Meds:    budesonide-formoterol 160-4.5 mcg  2 puff Inhalation Q12H    clopidogrel  75 mg Oral Daily    heparin (porcine)  5,000 Units Subcutaneous Q8H    insulin aspart U-100  10 Units Subcutaneous TIDWM    insulin detemir U-100  30 Units Subcutaneous QHS    losartan  50 mg Oral Daily    metoprolol succinate  50 mg Oral Daily    polyethylene glycol  17 g Oral Daily    riboflavin (vitamin B2)  100 mg Oral Daily    sertraline  100 mg Oral Daily    simvastatin  80 mg Oral QHS    thiamine (VITAMIN B1) IVPB  100 mg Intravenous Daily     Continuous Infusions:   As Needed: acetaminophen, acetaminophen, albuterol-ipratropium, bisacodyl, butalbital-acetaminophen-caffeine -40 mg, dextrose 50%, dextrose 50%, glucagon (human recombinant), glucose, glucose, hydrALAZINE, insulin aspart U-100, OLANZapine, ondansetron, ramelteon, sodium chloride 0.9%    Active Hospital Problems    Diagnosis  POA    *Acute metabolic encephalopathy [G93.41]  Yes    Chronic kidney disease, stage III (moderate) [N18.3]  Yes    Hyponatremia [E87.1]  Yes    Type 2 diabetes mellitus with hyperglycemia, with long-term current use of insulin [E11.65, Z79.4]  Not Applicable    CAD (coronary artery disease) [I25.10]  Yes     PTCA/stent 2010      Essential hypertension [I10]  Yes     1987      MS (multiple sclerosis) [G35]  Yes     1990      COPD  (chronic obstructive pulmonary disease) with chronic bronchitis [J44.9]  Yes      Resolved Hospital Problems   No resolved problems to display.       Overview:    72 yo male on a background significant for MS, CAD, T2DM, HTN & COPD who is admitted for acute metabolic encephalopathy. Drug screen, UA, CXR were unremarkable, but BG >400 on presentation. CTH did not reveal any acute findings, but MRI was suggestive of active demyelination involving the left caudate head; neurology was consulted. These findings were not believed to contribute to his AMS and further work up was pursued. An EEG (1/10) indicated moderate encephalopathy. His mental status improved somewhat as his BGs improved.     Assessment and Plan for Problems addressed today:    Acute metabolic encephalopathy  MS (multiple sclerosis)  · AMS likely multifactorial in setting of hyperglycemia, uncontrolled HTN and active MS.  · Received olanzapine x1 in ED. Continued home sertraline.   · Drug screen negative. UA & CXR unremarkable. 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 MRI: suggestive of active demyelination involving left caudate head.   · Consulted Neurology; Ordered B1, B2, folate and routine EEG. (1/9)  · B12 & TSH wnl, RPR non-reactive. EEG suggesting moderate encephalopathy. (1/10)  · Per nursing and family, patient is confused and not back to baseline. Ordered empiric B1 & B2 supplementation, pending results. (1/12)  · B2 low-normal. Mental status slowly improving.    Type 2 diabetes mellitus with hyperglycemia, with long-term current use of insulin  DMII with hypertension  · BG >400 on presentation. Received 1L LR & 26U detemir in ED.  · A1c (1/9): 10.5%. Home regimen: glipizide 5mg, linagliptin 5mg, metformin 1000mg; holding while admitted.    · Continue home medications: metoprolol succinate 50 mg daily, changed valsartan 160 mg to losartan 50 mg daily; hydralazine PRN.  · Initiated basal, prandial and low-dose correction  insulin.  · Adjusting insulin doses. (1/9)   · BG has improved.  · Plan to resume home antihyperglycemics if feasible with initiation of long-acting insulin. [facility] (1/11)   · BGs are well controlled with current insulin regimen and diet. (1/12)    Chronic kidney disease, stage III (moderate)  · Stable. Baseline sCr: ~1.3.    CAD (coronary artery disease)  Hyperlipidemia   · EKG & troponin negative for ischemia   · Continued home clopidogrel and simvastatin.     COPD (chronic obstructive pulmonary disease) with chronic bronchitis  · Continued budesonide-formoterol inhaler and PRN Duonebs.     Hyponatremia, resolving  · In setting of hyperglycemia; improved with BG correction and IVF.      Diet: Diet diabetic [facility]; 2000 Calorie; Cardiac (Low Na/Chol)    DVT Prophylaxis:   Anticoagulants   Medication Route Frequency    heparin (porcine) injection 5,000 Units Subcutaneous Q8H       L/D/A:  PIV    Discharge plan and follow up  Home or Self Care      Provider  Sheryl Manuel MD  [facility] [place] [facility] MED D   Department of Hospital Medicine    Scribe Attestation: I personally scribed for Sheryl Manuel MD on 01/13/2019 at 9:29 AM. Electronically signed by sheng Valdez on 01/13/2019 at 9:29 AM.

## 2019-01-13 NOTE — PROGRESS NOTES
"Physician Attestation for Scribe:  I, Sheryl Manuel MD, personally performed the services described in this documentation. All medical record entries made by the scribe were at my direction and in my presence.  I have reviewed this note and agree that the record reflects my personal performance and is accurate and complete.     [facility] Medicine  Progress Note     Patient Name: Vicente Luciano  MRN: [id number]  Team: [facility] MED D Sheryl Manuel MD   Admit Date: 1/8/2019  NEDRA 1/15/2019  Code status: Full Code     Principal Problem:  Acute metabolic encephalopathy     Interval history: Patient with no new complaints. Per nursing, patient appears confused with which family agrees. Patient claims he's "fine" and wants to go home.      Review of Systems   Constitutional: Negative for fever.   Respiratory: Negative for shortness of breath.          Physical Exam:  Temp:  [97.2 °F (36.2 °C)-98.4 °F (36.9 °C)]   Pulse:  [65-78]   Resp:  [16-19]   BP: (126-170)/(69-79)   SpO2:  [95 %-97 %]       Temp: 97.6 °F (36.4 °C) (01/12/19 0409)  Pulse: 65 (01/12/19 0409)  Resp: 18 (01/12/19 0409)  BP: (!) 170/79 (01/12/19 0409)  SpO2: 96 % (01/12/19 0409)  No intake or output data in the 24 hours ending 01/12/19 0838  Weight: 104.3 kg (230 lb)  Body mass index is 30.34 kg/m².     Physical Exam   Constitutional: No distress.   Eyes: Conjunctivae and lids are normal.   Cardiovascular: S1 normal and S2 normal.   Pulmonary/Chest: Effort normal and breath sounds normal.   Abdominal: Soft. Bowel sounds are normal. There is no tenderness.   Musculoskeletal: He exhibits no edema.         Significant Labs:         Recent Labs   Lab 01/09/19  0530 01/10/19  0640 01/11/19  0425 01/12/19  0450   WBC 7.82 9.89 6.57 4.90   HGB 14.5 15.7 15.2 14.6   HCT 42.7 45.6 44.9 42.4    185 160 162              Recent Labs   Lab 01/08/19  2022   01/10/19  0640 01/11/19  0425 01/12/19  0450   *   < > 137 137 140   K 4.7   < > 4.0 3.9 3.7 "   CL 97   < > 105 106 108   CO2 24   < > 20* 20* 23   BUN 16   < > 24* 31* 29*   CREATININE 1.5*   < > 1.5* 1.3 1.2   *   < > 169* 95 97   CALCIUM 9.2   < > 9.6 8.9 9.0   MG 2.2   < > 2.0 2.1 2.3   PHOS  --    < > 4.2 3.7 3.1   ALKPHOS 122  --   --   --   --    ALT 30  --   --   --   --    AST 17  --   --   --   --    ALBUMIN 4.1  --  3.7 3.6 3.4*   PROT 7.3  --   --   --   --    BILITOT 0.5  --   --   --   --    INR 1.0  --   --   --   --    LIPASE 25  --   --   --   --     < > = values in this interval not displayed.               Recent Labs   Lab 01/10/19  2202 01/11/19  0555 01/11/19  0757 01/11/19  1133 01/11/19  1721 01/11/19  2157   POCTGLUCOSE 107 102 103 204* 106 132*      A1C:        Recent Labs   Lab 01/08/19 2007 01/09/19  0529   HGBA1C 10.3* 10.5*          Recent Labs   Lab 01/08/19 2022   TROPONINI 0.007         Inpatient Medications prescribed for management of current Problems:   Scheduled Meds:    budesonide-formoterol 160-4.5 mcg  2 puff Inhalation Q12H    clopidogrel  75 mg Oral Daily    heparin (porcine)  5,000 Units Subcutaneous Q8H    insulin aspart U-100  10 Units Subcutaneous TIDWM    insulin detemir U-100  30 Units Subcutaneous QHS    losartan  50 mg Oral Daily    metoprolol succinate  50 mg Oral Daily    polyethylene glycol  17 g Oral Daily    sertraline  100 mg Oral Daily    simvastatin  80 mg Oral QHS      Continuous Infusions:   As Needed: acetaminophen, acetaminophen, albuterol-ipratropium, bisacodyl, butalbital-acetaminophen-caffeine -40 mg, dextrose 50%, dextrose 50%, glucagon (human recombinant), glucose, glucose, hydrALAZINE, insulin aspart U-100, OLANZapine, ondansetron, ramelteon, sodium chloride 0.9%            Active Hospital Problems     Diagnosis   POA    *Acute metabolic encephalopathy [G93.41]   Yes    Chronic kidney disease, stage III (moderate) [N18.3]   Yes    Hyponatremia [E87.1]   Yes    Type 2 diabetes mellitus with hyperglycemia, with  long-term current use of insulin [E11.65, Z79.4]   Not Applicable    CAD (coronary artery disease) [I25.10]   Yes       PTCA/stent 2010       Essential hypertension [I10]   Yes       1987       MS (multiple sclerosis) [G35]   Yes       1990       COPD (chronic obstructive pulmonary disease) with chronic bronchitis [J44.9]   Yes       Resolved Hospital Problems   No resolved problems to display.         Overview:    74 yo male on a background significant for MS, CAD, T2DM, HTN & COPD who is admitted for acute metabolic encephalopathy. Drug screen, UA, CXR were unremarkable, but BG >400 on presentation. CTH did not reveal any acute findings, but MRI was suggestive of active demyelination involving the left caudate head; neurology was consulted. These findings were not believed to contribute to his AMS and further work up was pursued. An EEG (1/10) indicated moderate encephalopathy. His mental status improved as his BGs improved.      Assessment and Plan for Problems addressed today:     Acute metabolic encephalopathy  MS (multiple sclerosis)  · AMS likely multifactorial in setting of hyperglycemia, uncontrolled HTN and active MS.  · Received olanzapine x1 in ED. Continued home sertraline.   · Drug screen negative. UA & CXR unremarkable. 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 MRI: suggestive of active demyelination involving left caudate head.   · Consulted Neurology; Ordered B1, B2, folate and routine EEG. (1/9)  · B12 & TSH wnl, RPR non-reactive. EEG suggesting moderate encephalopathy. (1/10)  · Per nursing and family, patient is confused and not back to baseline. Ordered empiric B1 & B2 supplementation, pending results. (1/12)     Type 2 diabetes mellitus with hyperglycemia, with long-term current use of insulin  DMII with hypertension  · BG >400 on presentation. Received 1L LR & 26U detemir in ED.  · A1c (1/9): 10.5%. Home regimen: glipizide 5mg, linagliptin 5mg, metformin 1000mg; holding while  admitted.    · Continue home medications: metoprolol succinate 50 mg daily, changed valsartan 160 mg to losartan 50 mg daily; hydralazine PRN.  · Initiated basal, prandial and low-dose correction insulin.  · Adjusting insulin doses. (1/9)   · BG has improved.  · Plan to resume home antihyperglycemics if feasible with initiation of long-acting insulin. [facility] (1/11)   · BGs are well controlled with current regimen. (1/12)     Chronic kidney disease, stage III (moderate)  · Stable. Baseline sCr: ~1.3.     CAD (coronary artery disease)  Hyperlipidemia   · EKG & troponin negative for ischemia   · Continued home clopidogrel and simvastatin.      COPD (chronic obstructive pulmonary disease) with chronic bronchitis  · Continued budesonide-formoterol inhaler and PRN Duonebs.      Hyponatremia, resolving  · In setting of hyperglycemia; improved with BG correction and IVF.       Diet: Diet diabetic [facility]; 2000 Calorie; Cardiac (Low Na/Chol)     DVT Prophylaxis:         Anticoagulants   Medication Route Frequency    heparin (porcine) injection 5,000 Units Subcutaneous Q8H         L/D/A:  PIV     Discharge plan and follow up  Home or Self Care        Provider  Sheryl Manuel MD  [facility] MED D   Department of Hospital Medicine     Scribe Attestation: I personally scribed for Sheryl Manuel MD on 01/12/2019 at 9:29 AM. Electronically signed by sheng Valdez on 01/12/2019 at 9:29 AM.

## 2019-01-14 VITALS
BODY MASS INDEX: 30.48 KG/M2 | HEIGHT: 73 IN | DIASTOLIC BLOOD PRESSURE: 82 MMHG | SYSTOLIC BLOOD PRESSURE: 166 MMHG | OXYGEN SATURATION: 96 % | HEART RATE: 73 BPM | RESPIRATION RATE: 16 BRPM | WEIGHT: 230 LBS | TEMPERATURE: 99 F

## 2019-01-14 PROBLEM — G93.41 ACUTE METABOLIC ENCEPHALOPATHY: Status: RESOLVED | Noted: 2019-01-09 | Resolved: 2019-01-14

## 2019-01-14 PROBLEM — E87.1 HYPONATREMIA: Status: RESOLVED | Noted: 2019-01-09 | Resolved: 2019-01-14

## 2019-01-14 LAB
ALBUMIN SERPL BCP-MCNC: 3.4 G/DL
ANION GAP SERPL CALC-SCNC: 10 MMOL/L
BASOPHILS # BLD AUTO: 0.02 K/UL
BASOPHILS NFR BLD: 0.4 %
BUN SERPL-MCNC: 16 MG/DL
CALCIUM SERPL-MCNC: 9.3 MG/DL
CHLORIDE SERPL-SCNC: 107 MMOL/L
CO2 SERPL-SCNC: 22 MMOL/L
CREAT SERPL-MCNC: 1 MG/DL
DIFFERENTIAL METHOD: NORMAL
EOSINOPHIL # BLD AUTO: 0.1 K/UL
EOSINOPHIL NFR BLD: 1.4 %
ERYTHROCYTE [DISTWIDTH] IN BLOOD BY AUTOMATED COUNT: 12.5 %
EST. GFR  (AFRICAN AMERICAN): >60 ML/MIN/1.73 M^2
EST. GFR  (NON AFRICAN AMERICAN): >60 ML/MIN/1.73 M^2
GLUCOSE SERPL-MCNC: 160 MG/DL
HCT VFR BLD AUTO: 43.7 %
HGB BLD-MCNC: 14.9 G/DL
IMM GRANULOCYTES # BLD AUTO: 0.02 K/UL
IMM GRANULOCYTES NFR BLD AUTO: 0.4 %
LYMPHOCYTES # BLD AUTO: 1.6 K/UL
LYMPHOCYTES NFR BLD: 33.5 %
MAGNESIUM SERPL-MCNC: 1.9 MG/DL
MCH RBC QN AUTO: 29.6 PG
MCHC RBC AUTO-ENTMCNC: 34.1 G/DL
MCV RBC AUTO: 87 FL
MONOCYTES # BLD AUTO: 0.4 K/UL
MONOCYTES NFR BLD: 7.9 %
NEUTROPHILS # BLD AUTO: 2.7 K/UL
NEUTROPHILS NFR BLD: 56.4 %
NRBC BLD-RTO: 0 /100 WBC
PHOSPHATE SERPL-MCNC: 2.8 MG/DL
PLATELET # BLD AUTO: 164 K/UL
PMV BLD AUTO: 11 FL
POCT GLUCOSE: 139 MG/DL (ref 70–110)
POCT GLUCOSE: 91 MG/DL (ref 70–110)
POTASSIUM SERPL-SCNC: 4 MMOL/L
RBC # BLD AUTO: 5.04 M/UL
SODIUM SERPL-SCNC: 139 MMOL/L
VIT B1 BLD-MCNC: 51 UG/L (ref 38–122)
WBC # BLD AUTO: 4.83 K/UL

## 2019-01-14 PROCEDURE — 85025 COMPLETE CBC W/AUTO DIFF WBC: CPT

## 2019-01-14 PROCEDURE — 99217 PR OBSERVATION CARE DISCHARGE: CPT | Mod: ,,, | Performed by: INTERNAL MEDICINE

## 2019-01-14 PROCEDURE — 25000003 PHARM REV CODE 250: Performed by: INTERNAL MEDICINE

## 2019-01-14 PROCEDURE — 99217 PR OBSERVATION CARE DISCHARGE: ICD-10-PCS | Mod: ,,, | Performed by: INTERNAL MEDICINE

## 2019-01-14 PROCEDURE — 63600175 PHARM REV CODE 636 W HCPCS: Performed by: PHYSICIAN ASSISTANT

## 2019-01-14 PROCEDURE — 25000003 PHARM REV CODE 250: Performed by: PHYSICIAN ASSISTANT

## 2019-01-14 PROCEDURE — 80069 RENAL FUNCTION PANEL: CPT

## 2019-01-14 PROCEDURE — 83735 ASSAY OF MAGNESIUM: CPT

## 2019-01-14 PROCEDURE — G0378 HOSPITAL OBSERVATION PER HR: HCPCS

## 2019-01-14 PROCEDURE — 36415 COLL VENOUS BLD VENIPUNCTURE: CPT

## 2019-01-14 RX ORDER — LOSARTAN POTASSIUM 50 MG/1
50 TABLET ORAL DAILY
Qty: 90 TABLET | Refills: 3 | Status: SHIPPED | OUTPATIENT
Start: 2019-01-15 | End: 2019-10-03 | Stop reason: SDUPTHER

## 2019-01-14 RX ADMIN — POLYETHYLENE GLYCOL 3350 17 G: 17 POWDER, FOR SOLUTION ORAL at 09:01

## 2019-01-14 RX ADMIN — BUDESONIDE AND FORMOTEROL FUMARATE DIHYDRATE 2 PUFF: 160; 4.5 AEROSOL RESPIRATORY (INHALATION) at 09:01

## 2019-01-14 RX ADMIN — INSULIN ASPART 10 UNITS: 100 INJECTION, SOLUTION INTRAVENOUS; SUBCUTANEOUS at 09:01

## 2019-01-14 RX ADMIN — HEPARIN SODIUM 5000 UNITS: 5000 INJECTION, SOLUTION INTRAVENOUS; SUBCUTANEOUS at 05:01

## 2019-01-14 RX ADMIN — Medication 100 MG: at 09:01

## 2019-01-14 RX ADMIN — CLOPIDOGREL 75 MG: 75 TABLET, FILM COATED ORAL at 09:01

## 2019-01-14 RX ADMIN — LOSARTAN POTASSIUM 50 MG: 50 TABLET, FILM COATED ORAL at 09:01

## 2019-01-14 RX ADMIN — HEPARIN SODIUM 5000 UNITS: 5000 INJECTION, SOLUTION INTRAVENOUS; SUBCUTANEOUS at 03:01

## 2019-01-14 RX ADMIN — SERTRALINE 100 MG: 100 TABLET, FILM COATED ORAL at 09:01

## 2019-01-14 RX ADMIN — METOPROLOL SUCCINATE 50 MG: 50 TABLET, EXTENDED RELEASE ORAL at 09:01

## 2019-01-14 NOTE — PLAN OF CARE
Problem: Adult Inpatient Plan of Care  Goal: Plan of Care Review  Outcome: Ongoing (interventions implemented as appropriate)  Patient aao to self, place, year - disoriented to month, situation - easily reorients. Diminished ST memory. PRAKASH 5/5. VSS, BP within parameters, no insulin coverage given due to cbs 88. Bedtime snack given with long acting insulin. One episode of HS confusion, otherwise using call bell appropriately. High fall risk and safety precautions maintained - will continue to monitor.

## 2019-01-14 NOTE — MEDICAL/APP STUDENT
Hospital Medicine  Progress Note    Patient Name: Vicente Luciano  MRN: [id number]  Team: [facility] MED D Sheryl Manuel MD   Admit Date: 1/8/2019  NEDRA 1/15/2019  Code status: Full Code    Principal Problem:  Acute metabolic encephalopathy    Interval history: No reported complaints or events.     Review of Systems   Constitutional: Negative for fever.   Respiratory: Negative for shortness of breath.        Physical Exam:  Temp:  [96.7 °F (35.9 °C)-98.6 °F (37 °C)]   Pulse:  [55-89]   Resp:  [14-18]   BP: (123-168)/(70-90)   SpO2:  [92 %-98 %]      Temp: 97.7 °F (36.5 °C) (01/14/19 0756)  Pulse: 65 (01/14/19 0756)  Resp: 18 (01/14/19 0756)  BP: (!) 149/87 (01/14/19 0756)  SpO2: 98 % (01/14/19 0756)    Intake/Output Summary (Last 24 hours) at 1/14/2019 0800  Last data filed at 1/14/2019 0100  Gross per 24 hour   Intake 235 ml   Output 400 ml   Net -165 ml     Weight: 104.3 kg (229 lb 16 oz)  Body mass index is 30.34 kg/m².    Physical Exam   Constitutional: No distress.   Eyes: Conjunctivae and lids are normal.   Cardiovascular: S1 normal and S2 normal.   Pulmonary/Chest: Effort normal and breath sounds normal.   Abdominal: Soft. Bowel sounds are normal. There is no tenderness.   Musculoskeletal: He exhibits no edema.       Significant Labs:  Recent Labs   Lab 01/11/19 0425 01/12/19 0450 01/13/19 0352 01/14/19 0413   WBC 6.57 4.90 4.96 4.83   HGB 15.2 14.6 14.9 14.9   HCT 44.9 42.4 45.1 43.7    162 164 164     Recent Labs   Lab 01/08/19 2022 01/12/19 0450 01/13/19 0352 01/14/19 0413   *   < > 140 140 139   K 4.7   < > 3.7 4.4 4.0   CL 97   < > 108 108 107   CO2 24   < > 23 24 22*   BUN 16   < > 29* 18 16   CREATININE 1.5*   < > 1.2 1.1 1.0   *   < > 97 137* 160*   CALCIUM 9.2   < > 9.0 9.2 9.3   MG 2.2   < > 2.3 2.1 1.9   PHOS  --    < > 3.1 3.2 2.8   ALKPHOS 122  --   --   --   --    ALT 30  --   --   --   --    AST 17  --   --   --   --    ALBUMIN 4.1   < > 3.4* 3.3* 3.4*   PROT 7.3  --    --   --   --    BILITOT 0.5  --   --   --   --    INR 1.0  --   --   --   --    LIPASE 25  --   --   --   --     < > = values in this interval not displayed.     Recent Labs   Lab 01/13/19  0549 01/13/19  0747 01/13/19  1144 01/13/19  1625 01/13/19  2054 01/14/19  0759   POCTGLUCOSE 133* 84 211* 117* 88 139*     A1C:   Recent Labs   Lab 01/08/19 2007 01/09/19  0529   HGBA1C 10.3* 10.5*     Recent Labs   Lab 01/08/19 2022   TROPONINI 0.007       Inpatient Medications prescribed for management of current Problems:   Scheduled Meds:    budesonide-formoterol 160-4.5 mcg  2 puff Inhalation Q12H    clopidogrel  75 mg Oral Daily    heparin (porcine)  5,000 Units Subcutaneous Q8H    insulin aspart U-100  10 Units Subcutaneous TIDWM    insulin detemir U-100  30 Units Subcutaneous QHS    losartan  50 mg Oral Daily    metoprolol succinate  50 mg Oral Daily    polyethylene glycol  17 g Oral Daily    riboflavin (vitamin B2)  100 mg Oral Daily    sertraline  100 mg Oral Daily    simvastatin  80 mg Oral QHS    thiamine (VITAMIN B1) IVPB  100 mg Intravenous Daily     Continuous Infusions:   As Needed: acetaminophen, acetaminophen, albuterol-ipratropium, bisacodyl, butalbital-acetaminophen-caffeine -40 mg, dextrose 50%, dextrose 50%, glucagon (human recombinant), glucose, glucose, hydrALAZINE, insulin aspart U-100, OLANZapine, ondansetron, ramelteon, sodium chloride 0.9%    Active Hospital Problems    Diagnosis  POA    Chronic kidney disease, stage III (moderate) [N18.3]  Yes    Type 2 diabetes mellitus with hyperglycemia, with long-term current use of insulin [E11.65, Z79.4]  Not Applicable    CAD (coronary artery disease) [I25.10]  Yes     PTCA/stent 2010      Essential hypertension [I10]  Yes     1987      MS (multiple sclerosis) [G35]  Yes     1990      COPD (chronic obstructive pulmonary disease) with chronic bronchitis [J44.9]  Yes      Resolved Hospital Problems    Diagnosis Date Resolved POA     *Acute metabolic encephalopathy [G93.41] 01/14/2019 Yes    Hyponatremia [E87.1] 01/14/2019 Yes       Overview:    74 yo male with MS, CAD, T2DM, HTN & COPD admitted for acute metabolic encephalopathy. Drug screen, UA, CXR were unremarkable, but BG >400 on presentation. CTH did not reveal any acute findings, but MRI was suggestive of active demyelination involving the left caudate head; Neurology was consulted. These findings were not believed to contribute to his AMS and further work up was pursued; TSH, RPR, B1, B2, B9, B12 unremarkable. An EEG (1/10) indicated moderate encephalopathy. His mental status improved slowly as his BGs improved.     Assessment and Plan for Problems addressed today:    Acute metabolic encephalopathy  MS (multiple sclerosis)  · AMS likely multifactorial in setting of hyperglycemia, uncontrolled HTN and active MS.  · Received olanzapine x1 in ED. Continued home sertraline.   · Drug screen negative. UA & CXR unremarkable. 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 MRI: suggestive of active demyelination involving left caudate head.   · Consulted Neurology; Ordered B1, B2, folate and routine EEG. (1/9)  · B12 & TSH wnl, RPR non-reactive. EEG suggesting moderate encephalopathy. (1/10)  · Per nursing and family, patient confused and not back to baseline. Ordered empiric B1 & B2 supplementation, pending results. (1/12)  · B2 low-normal, thiamine WNL. Mental status slowly improving. Patient received 2 doses of IV thiamine.    Type 2 diabetes mellitus with hyperglycemia, with long-term current use of insulin  DMII with hypertension  · BG >400 on presentation. Received 1L LR & 26U detemir in ED.  · A1c (1/9): 10.5%. Home regimen: glipizide 5mg, linagliptin 5mg, metformin 1000mg; holding while admitted.    · Continued home medications: metoprolol succinate 50 mg daily, changed valsartan 160 mg to losartan 50 mg daily; hydralazine PRN.  · Initiated basal, prandial and low-dose correction  insulin.  · Adjusting insulin doses. (1/9)   · BG has improved.  · Plan to resume home antihyperglycemics, if feasible with initiation of long-acting insulin. [facility] (1/11)   · BGs are well controlled with current insulin regimen (basal 30 units QHS + prandial 10 units TIDWM) and diabetic diet adherence. (1/12)    Chronic kidney disease, stage III (moderate)  · Stable. Baseline sCr: ~1.3.    CAD (coronary artery disease)  Hyperlipidemia   · EKG & troponin negative for ischemia   · Continued home clopidogrel and simvastatin.     COPD (chronic obstructive pulmonary disease) with chronic bronchitis  · Continued budesonide-formoterol inhaler and PRN Duonebs.     Hyponatremia, resolving  · In setting of hyperglycemia; improved with BG correction and IVF.      Diet: Diet diabetic [facility]; 2000 Calorie; Cardiac (Low Na/Chol)    DVT Prophylaxis:   Anticoagulants   Medication Route Frequency    heparin (porcine) injection 5,000 Units Subcutaneous Q8H       L/D/A:  PIV    Discharge plan and follow up  [place] [facility]      Provider  Sheryl Manuel MD  [facility] MED D   Department of Hospital Medicine    Sheng Attestation: I personally scribed for Sheryl Manuel MD on 01/14/2019 at 9:29 AM. Electronically signed by sheng Valdez on 01/14/2019 at 9:29 AM.

## 2019-01-14 NOTE — PLAN OF CARE
[facility]    HOME HEALTH ORDERS  FACE TO FACE ENCOUNTER    Patient Name: Vicente Luciano  YOB: 1945    PCP: MINGO Garvin MD   PCP Address: [address] [place] [address] / [facility] [address]  PCP Phone Number: [phone]  PCP Fax: [phone]    Encounter Date: 01/14/2019    Admit to Home Health    Diagnoses:  Active Hospital Problems    Diagnosis  POA    Chronic kidney disease, stage III (moderate) [N18.3]  Yes    Type 2 diabetes mellitus with hyperglycemia, with long-term current use of insulin [E11.65, Z79.4]  Not Applicable    CAD (coronary artery disease) [I25.10]  Yes     PTCA/stent 2010      Essential hypertension [I10]  Yes     1987      MS (multiple sclerosis) [G35]  Yes     1990      COPD (chronic obstructive pulmonary disease) with chronic bronchitis [J44.9]  Yes      Resolved Hospital Problems    Diagnosis Date Resolved POA    *Acute metabolic encephalopathy [G93.41] 01/14/2019 Yes    Hyponatremia [E87.1] 01/14/2019 Yes       Follow-up Information     MINGO Garvin MD. Schedule an appointment as soon as possible for a visit in 1 week.    Specialty:  Internal Medicine  Why:  For discharge from hospital follow up  Contact information:  [facility] [address] [address] [phone]                   I have seen and examined this patient face to face today. My clinical findings that support the need for the home health skilled services and home bound status are the following:  Weakness/numbness causing balance and gait disturbance due to Weakness/Debility and MS making it taxing to leave home.  Patient with medication mismanagement issues requiring home bound status as evidenced by  Poor understanding of medication regimen/dosage and Uncontrolled hyperglycemic/hypoglycemic events.  Mental confusion making it unsafe for patient to leave home alone due to  Acute Delirium.    Allergies:  Review of patient's allergies indicates:   Allergen  Reactions    Pcn [penicillins] Itching       Diet: diabetic diet: 2200 calorie    Activities: activity as tolerated    Nursing:   SN to complete comprehensive assessment including routine vital signs. Instruct on disease process and s/s of complications to report to MD. Review/verify medication list sent home with the patient at time of discharge  and instruct patient/caregiver as needed. Frequency may be adjusted depending on start of care date.    Notify MD if SBP > 160 or < 90; DBP > 90 or < 50; HR > 120 or < 50; Temp > 101      CONSULTS:    Physical Therapy to evaluate and treat. Evaluate for home safety and equipment needs; Establish/upgrade home exercise program. Perform / instruct on therapeutic exercises, gait training, transfer training, and Range of Motion.  Occupational Therapy to evaluate and treat. Evaluate home environment for safety and equipment needs. Perform/Instruct on transfers, ADL training, ROM, and therapeutic exercises.    MISCELLANEOUS CARE:  Diabetic Care:   SN to perform and educate Diabetic management with blood glucose monitoring:, Fingerstick blood sugar AC and HS and Report CBG < 70 or > 250 to physician.      Medications: Review discharge medications with patient and family and provide education.      Current Discharge Medication List      START taking these medications    Details   losartan (COZAAR) 50 MG tablet Take 1 tablet (50 mg total) by mouth once daily.  Qty: 90 tablet, Refills: 3         CONTINUE these medications which have NOT CHANGED    Details   albuterol (ACCUNEB) 0.63 mg/3 mL Nebu Take 0.63 mg by nebulization every 6 (six) hours as needed.      budesonide-formoterol 160-4.5 mcg (SYMBICORT) 160-4.5 mcg/actuation HFAA Inhale 2 puffs into the lungs every 12 (twelve) hours.      butalbital-acetaminophen-caffeine -40 mg (FIORICET, ESGIC) -40 mg per tablet Take 1 tablet by mouth every 6 (six) hours as needed for Pain or Headaches.  Qty: 10 tablet, Refills: 0       cholecalciferol, vitamin D3, 5,000 unit Tab Take 5,000 Units by mouth once daily.      clopidogrel (PLAVIX) 75 mg tablet Take 75 mg by mouth once daily.      flunisolide 25 mcg, 0.025%, (NASALIDE) 25 mcg (0.025 %) Spry 2 sprays by Nasal route 2 (two) times daily.      glipiZIDE (GLUCOTROL) 5 MG tablet Take 5 mg by mouth 2 (two) times daily before meals.      metformin (GLUCOPHAGE) 1000 MG tablet Take 1,000 mg by mouth 2 (two) times daily with meals.      sertraline (ZOLOFT) 100 MG tablet Take 100 mg by mouth once daily.      simvastatin (ZOCOR) 80 MG tablet Take 80 mg by mouth every evening.      linagliptin (TRADJENTA) 5 mg Tab tablet Take 1 tablet (5 mg total) by mouth once daily.  Qty: 90 tablet, Refills: 3    Associated Diagnoses: DM (diabetes mellitus)      loratadine (CLARITIN) 10 mg tablet Take 10 mg by mouth daily as needed.      metoprolol succinate (TOPROL-XL) 25 MG 24 hr tablet Take 2 tablets (50 mg total) by mouth once daily.  Qty: 30 tablet, Refills: 5      omeprazole (PRILOSEC) 20 MG capsule Take 20 mg by mouth daily as needed.      tramadol (ULTRAM) 50 mg tablet Take 50 mg by mouth every 6 (six) hours as needed for Pain.         STOP taking these medications       valsartan (DIOVAN) 160 MG tablet Comments:   Reason for Stopping:         alprazolam (XANAX) 1 MG tablet Comments:   Reason for Stopping:         interferon beta-1a (AVONEX) 30 mcg/0.5 mL injection Comments:   Reason for Stopping:         trazodone (DESYREL) 50 MG tablet Comments:   Reason for Stopping:               I certify that this patient is confined to his home and needs intermittent skilled nursing care, physical therapy and occupational therapy.      Sheryl Manuel MD

## 2019-01-14 NOTE — PLAN OF CARE
FOR YOUR [facility]    Insulin regimen:     insulin aspart U-100  10 Units Subcutaneous TIDWM    insulin detemir U-100  30 Units Subcutaneous QHS       Diet:  2200 Calorie; Cardiac (Low Na/Chol)

## 2019-01-14 NOTE — PROGRESS NOTES
Physician Attestation for Scribe:  I, Sheryl Manuel MD, personally performed the services described in this documentation. All medical record entries made by the scribe were at my direction and in my presence.  I have reviewed this note and agree that the record reflects my personal performance and is accurate and complete.     Hospital Medicine  Progress Note     Patient Name: Vicente Luciano  MRN: [id number]  Team: [facility] [place] [facility] MED D Sheryl Manuel MD   Admit Date: 1/8/2019  NEDRA 1/15/2019  Code status: Full Code     Principal Problem:  Acute metabolic encephalopathy     Interval history: No reported complaints or events. Improving per wife at bedside. Reports poor adherence to diet at home.     Review of Systems   Constitutional: Negative for fever.   Respiratory: Negative for shortness of breath.          Physical Exam:  Temp:  [97 °F (36.1 °C)-99.6 °F (37.6 °C)]   Pulse:  [61-85]   Resp:  [16-18]   BP: (123-167)/(72-88)   SpO2:  [93 %-99 %]       Temp: 98.6 °F (37 °C) (01/13/19 0803)  Pulse: 61 (01/13/19 0803)  Resp: 16 (01/13/19 0803)  BP: 123/72 (01/13/19 0803)  SpO2: 96 % (01/13/19 0803)     Intake/Output Summary (Last 24 hours) at 1/13/2019 0835  Last data filed at 1/13/2019 0615      Gross per 24 hour   Intake --   Output 700 ml   Net -700 ml      Weight: 104.3 kg (229 lb 16 oz)  Body mass index is 30.34 kg/m².     Physical Exam   Constitutional: No distress.   Eyes: Conjunctivae and lids are normal.   Cardiovascular: S1 normal and S2 normal.   Pulmonary/Chest: Effort normal and breath sounds normal.   Abdominal: Soft. Bowel sounds are normal. There is no tenderness.   Musculoskeletal: He exhibits no edema.         Significant Labs:         Recent Labs   Lab 01/10/19  0640 01/11/19  0425 01/12/19  0450 01/13/19  0352   WBC 9.89 6.57 4.90 4.96   HGB 15.7 15.2 14.6 14.9   HCT 45.6 44.9 42.4 45.1    160 162 164              Recent Labs   Lab 01/08/19 2022 01/11/19  0425 01/12/19  0450 01/13/19  0352    *   < > 137 140 140   K 4.7   < > 3.9 3.7 4.4   CL 97   < > 106 108 108   CO2 24   < > 20* 23 24   BUN 16   < > 31* 29* 18   CREATININE 1.5*   < > 1.3 1.2 1.1   *   < > 95 97 137*   CALCIUM 9.2   < > 8.9 9.0 9.2   MG 2.2   < > 2.1 2.3 2.1   PHOS  --    < > 3.7 3.1 3.2   ALKPHOS 122  --   --   --   --    ALT 30  --   --   --   --    AST 17  --   --   --   --    ALBUMIN 4.1   < > 3.6 3.4* 3.3*   PROT 7.3  --   --   --   --    BILITOT 0.5  --   --   --   --    INR 1.0  --   --   --   --    LIPASE 25  --   --   --   --     < > = values in this interval not displayed.               Recent Labs   Lab 01/11/19  2157 01/12/19  0803 01/12/19  1200 01/12/19  1715 01/13/19  0106 01/13/19  0549   POCTGLUCOSE 132* 95 177* 151* 168* 133*      A1C:        Recent Labs   Lab 01/08/19 2007 01/09/19  0529   HGBA1C 10.3* 10.5*          Recent Labs   Lab 01/08/19 2022   TROPONINI 0.007         Inpatient Medications prescribed for management of current Problems:   Scheduled Meds:    budesonide-formoterol 160-4.5 mcg  2 puff Inhalation Q12H    clopidogrel  75 mg Oral Daily    heparin (porcine)  5,000 Units Subcutaneous Q8H    insulin aspart U-100  10 Units Subcutaneous TIDWM    insulin detemir U-100  30 Units Subcutaneous QHS    losartan  50 mg Oral Daily    metoprolol succinate  50 mg Oral Daily    polyethylene glycol  17 g Oral Daily    riboflavin (vitamin B2)  100 mg Oral Daily    sertraline  100 mg Oral Daily    simvastatin  80 mg Oral QHS    thiamine (VITAMIN B1) IVPB  100 mg Intravenous Daily      Continuous Infusions:   As Needed: acetaminophen, acetaminophen, albuterol-ipratropium, bisacodyl, butalbital-acetaminophen-caffeine -40 mg, dextrose 50%, dextrose 50%, glucagon (human recombinant), glucose, glucose, hydrALAZINE, insulin aspart U-100, OLANZapine, ondansetron, ramelteon, sodium chloride 0.9%            Active Hospital Problems     Diagnosis   POA    *Acute metabolic encephalopathy  [G93.41]   Yes    Chronic kidney disease, stage III (moderate) [N18.3]   Yes    Hyponatremia [E87.1]   Yes    Type 2 diabetes mellitus with hyperglycemia, with long-term current use of insulin [E11.65, Z79.4]   Not Applicable    CAD (coronary artery disease) [I25.10]   Yes       PTCA/stent 2010       Essential hypertension [I10]   Yes       1987       MS (multiple sclerosis) [G35]   Yes       1990       COPD (chronic obstructive pulmonary disease) with chronic bronchitis [J44.9]   Yes       Resolved Hospital Problems   No resolved problems to display.         Overview:    72 yo male on a background significant for MS, CAD, T2DM, HTN & COPD who is admitted for acute metabolic encephalopathy. Drug screen, UA, CXR were unremarkable, but BG >400 on presentation. CTH did not reveal any acute findings, but MRI was suggestive of active demyelination involving the left caudate head; neurology was consulted. These findings were not believed to contribute to his AMS and further work up was pursued. An EEG (1/10) indicated moderate encephalopathy. His mental status improved somewhat as his BGs improved.      Assessment and Plan for Problems addressed today:     Acute metabolic encephalopathy  MS (multiple sclerosis)  · AMS likely multifactorial in setting of hyperglycemia, uncontrolled HTN and active MS.  · Received olanzapine x1 in ED. Continued home sertraline.   · Drug screen negative. UA & CXR unremarkable. 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 MRI: suggestive of active demyelination involving left caudate head.   · Consulted Neurology; Ordered B1, B2, folate and routine EEG. (1/9)  · B12 & TSH wnl, RPR non-reactive. EEG suggesting moderate encephalopathy. (1/10)  · Per nursing and family, patient is confused and not back to baseline. Ordered empiric B1 & B2 supplementation, pending results. (1/12)  · B2 low-normal. Mental status slowly improving.     Type 2 diabetes mellitus with hyperglycemia, with  long-term current use of insulin  DMII with hypertension  · BG >400 on presentation. Received 1L LR & 26U detemir in ED.  · A1c (1/9): 10.5%. Home regimen: glipizide 5mg, linagliptin 5mg, metformin 1000mg; holding while admitted.    · Continue home medications: metoprolol succinate 50 mg daily, changed valsartan 160 mg to losartan 50 mg daily; hydralazine PRN.  · Initiated basal, prandial and low-dose correction insulin.  · Adjusting insulin doses. (1/9)   · BG has improved.  · Plan to resume home antihyperglycemics if feasible with initiation of long-acting insulin. [facility] (1/11)   · BGs are well controlled with current insulin regimen and diet. (1/12)     Chronic kidney disease, stage III (moderate)  · Stable. Baseline sCr: ~1.3.     CAD (coronary artery disease)  Hyperlipidemia   · EKG & troponin negative for ischemia   · Continued home clopidogrel and simvastatin.      COPD (chronic obstructive pulmonary disease) with chronic bronchitis  · Continued budesonide-formoterol inhaler and PRN Duonebs.      Hyponatremia, resolving  · In setting of hyperglycemia; improved with BG correction and IVF.       Diet: Diet diabetic [facility]; 2000 Calorie; Cardiac (Low Na/Chol)     DVT Prophylaxis:         Anticoagulants   Medication Route Frequency    heparin (porcine) injection 5,000 Units Subcutaneous Q8H         L/D/A:  PIV     Discharge plan and follow up  Home or Self Care        Provider  Sheryl Manuel MD  [facility] [place] [facility] HOS[facility] MED D   Department of Hospital Medicine     Scribe Attestation: I personally scribed for Sheryl Manuel MD on 01/13/2019 at 9:29 AM. Electronically signed by sheng Valdez on 01/13/2019 at 9:29 AM.

## 2019-01-14 NOTE — DISCHARGE SUMMARY
"Discharge Summary  Hospital Medicine    Patient Name: Vicente Luciano  MRN: [id number]  Attending Provider on Discharge: Sheryl Manuel MD  Hospital Medicine Team: [facility] MED D  Date of Admission:  1/8/2019     Date of Discharge:  1/14/2019  6:17 PM  Code status: Full Code    Active Hospital Problems    Diagnosis  POA    Chronic kidney disease, stage III (moderate) [N18.3]  Yes    Type 2 diabetes mellitus with hyperglycemia, with long-term current use of insulin [E11.65, Z79.4]  Not Applicable    CAD (coronary artery disease) [I25.10]  Yes     PTCA/stent 2010      Essential hypertension [I10]  Yes     1987      MS (multiple sclerosis) [G35]  Yes     1990      COPD (chronic obstructive pulmonary disease) with chronic bronchitis [J44.9]  Yes      Resolved Hospital Problems    Diagnosis Date Resolved POA    *Acute metabolic encephalopathy [G93.41] 01/14/2019 Yes    Hyponatremia [E87.1] 01/14/2019 Yes        HPI:  "73M with HTN, MS, CAD, T2DM, COPD who presents for evaluation of AMS. Per wife, around 1815 on 01/08 the patient had acute onset of nonsensical speech. She reports his speech was clear, but he was acting aggressively and erratically - at one point talking to and hugging a fan. The patient is in charge of his own medications, and usually does just fine with them, however, today the patient didn't take his medications including his prn SQ insulin. Otherwise he was in his usual state of health, no recent falls. He was altered after having some ice cream and [employer]. Wife denies any new medications, no EtOH or drug use, he has a Rx for Xanax but doesn't take it, no changes in PO intake, changes in sleep, dysuria, facial droop, unilateral weakness, fever, abdominal pain, neck stiffness, vision changes, falls, CP, SOB. He endorses a HA. Since his presentation his thought process has cleared up but the patient remains slightly off his baseline, but is about 50-75% there.    Hospital Course:  Patient " with MS, CAD, T2DM, HTN & COPD admitted for acute metabolic encephalopathy. Drug screen, UA, CXR were unremarkable, but BG >400 on presentation. CTH did not reveal any acute findings, but MRI was suggestive of active demyelination involving the left caudate head; Neurology was consulted. These findings were not believed to contribute to his AMS and further work up was pursued; TSH, RPR, B1, B2, B9, B12 unremarkable. An EEG (1/10) indicated moderate encephalopathy. His mental status improved slowly as his BGs improved.     Acute metabolic encephalopathy  MS (multiple sclerosis)  · AMS likely multifactorial in setting of hyperglycemia, uncontrolled HTN and active MS.  · Received olanzapine x1 in ED. Continued home sertraline.   · Drug screen negative. UA & CXR unremarkable. 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 MRI: suggestive of active demyelination involving left caudate head.   · Consulted Neurology; Ordered B1, B2, folate and routine EEG. (1/9)  · B12 & TSH wnl, RPR non-reactive. EEG suggesting moderate encephalopathy. (1/10)  · Per nursing and family, patient confused and not back to baseline. Ordered empiric B1 & B2 supplementation, pending results. (1/12)  · B2 low-normal, thiamine WNL. Mental status slowly improving. Patient received 2 doses of IV thiamine.    Type 2 diabetes mellitus with hyperglycemia, with long-term current use of insulin  DMII with hypertension  · BG >400 on presentation. Received 1L LR & 26U detemir in ED.  · A1c (1/9): 10.5%. Home regimen: glipizide 5mg, linagliptin 5mg, metformin 1000mg; holding while admitted.    · Continued home medications: metoprolol succinate 50 mg daily, changed valsartan 160 mg to losartan 50 mg daily; hydralazine PRN.  · Initiated basal, prandial and low-dose correction insulin.  · Adjusting insulin doses. (1/9)   · BG has improved.  · Plan to resume home antihyperglycemics, if feasible with initiation of long-acting insulin. [facility] (1/11)   · BGs are well controlled with current insulin regimen (basal 30 units QHS + prandial 10 units TIDWM) and diabetic diet adherence. (1/12)    Chronic kidney disease, stage III (moderate)  · Stable. Baseline sCr: ~1.3.    CAD (coronary artery disease)  Hyperlipidemia   · EKG & troponin negative for ischemia   · Continued home clopidogrel and simvastatin.     COPD (chronic obstructive pulmonary disease) with chronic bronchitis  · Continued budesonide-formoterol inhaler and PRN Duonebs.     Hyponatremia, resolving  · In setting of hyperglycemia; improved with BG correction and IVF.         Recent Labs   Lab 01/12/19 0450 01/13/19 0352 01/14/19 0413   WBC 4.90 4.96 4.83   HGB 14.6 14.9 14.9   HCT 42.4 45.1 43.7    164 164     Recent Labs   Lab 01/08/19 2022 01/12/19 0450 01/13/19 0352 01/14/19  0413   *   < > 140 140 139   K 4.7   < > 3.7 4.4 4.0   CL 97   < > 108 108 107   CO2 24   < > 23 24 22*   BUN 16   < > 29* 18 16   CREATININE 1.5*   < > 1.2 1.1 1.0   *   < > 97 137* 160*   CALCIUM 9.2   < > 9.0 9.2 9.3   MG 2.2   < > 2.3 2.1 1.9   PHOS  --    < > 3.1 3.2 2.8   LIPASE 25  --   --   --   --     < > = values in this interval not displayed.     Recent Labs   Lab 01/08/19 2022 01/12/19 0450 01/13/19 0352 01/14/19  0413   ALKPHOS 122  --   --   --   --    ALT 30  --   --   --   --    AST 17  --   --   --   --    ALBUMIN 4.1   < > 3.4* 3.3* 3.4*   PROT 7.3  --   --   --   --    BILITOT 0.5  --   --   --   --    INR 1.0  --   --   --   --     < > = values in this interval not displayed.      Recent Labs   Lab 01/13/19  0549 01/13/19  0747 01/13/19  1144 01/13/19  1625 01/13/19 2054 01/14/19  0759   POCTGLUCOSE 133* 84 211* 117* 88 139*       Procedures: none    Consultants:   Consults (From admission, onward)        Status Ordering Provider     Inpatient consult to Neurology  Once     Provider:  (Not yet assigned)    Completed MELANIE KWON           Medications:    Current Discharge Medication List      START taking these medications    Details   losartan (COZAAR) 50 MG tablet Take 1 tablet (50 mg total) by mouth once daily.  Qty: 90 tablet, Refills: 3         CONTINUE these medications which have NOT CHANGED    Details   albuterol (ACCUNEB) 0.63 mg/3 mL Nebu Take 0.63 mg by nebulization every 6 (six) hours as needed.      budesonide-formoterol 160-4.5 mcg (SYMBICORT) 160-4.5 mcg/actuation HFAA Inhale 2 puffs into the lungs every 12 (twelve) hours.      butalbital-acetaminophen-caffeine -40 mg (FIORICET, ESGIC) -40 mg per tablet Take 1 tablet by mouth every 6 (six) hours as needed for Pain or Headaches.  Qty: 10 tablet, Refills: 0      cholecalciferol, vitamin D3, 5,000 unit Tab Take 5,000 Units by mouth once daily.      clopidogrel (PLAVIX) 75 mg tablet Take 75 mg by mouth once daily.      flunisolide 25 mcg, 0.025%, (NASALIDE) 25 mcg (0.025 %) Spry 2 sprays by Nasal route 2 (two) times daily.      glipiZIDE (GLUCOTROL) 5 MG tablet Take 5 mg by mouth 2 (two) times daily before meals.      metformin (GLUCOPHAGE) 1000 MG tablet Take 1,000 mg by mouth 2 (two) times daily with meals.      sertraline (ZOLOFT) 100 MG tablet Take 100 mg by mouth once daily.      simvastatin (ZOCOR) 80 MG tablet Take 80 mg by mouth every evening.      linagliptin (TRADJENTA) 5 mg Tab tablet Take 1 tablet (5 mg total) by mouth once daily.  Qty: 90 tablet, Refills: 3    Associated Diagnoses: DM (diabetes mellitus)      loratadine (CLARITIN) 10 mg tablet Take 10 mg by mouth daily as needed.      metoprolol succinate (TOPROL-XL) 25 MG 24 hr tablet Take 2 tablets (50 mg total) by mouth once daily.  Qty: 30 tablet, Refills: 5      omeprazole (PRILOSEC) 20 MG capsule Take 20 mg by mouth daily as needed.      tramadol (ULTRAM) 50 mg tablet Take 50 mg by mouth every 6 (six) hours as needed for Pain.         STOP taking these medications       valsartan (DIOVAN) 160 MG tablet  Comments:   Reason for Stopping:         alprazolam (XANAX) 1 MG tablet Comments:   Reason for Stopping:         interferon beta-1a (AVONEX) 30 mcg/0.5 mL injection Comments:   Reason for Stopping:         trazodone (DESYREL) 50 MG tablet Comments:   Reason for Stopping:               Discharge Instructions:    FOR YOUR [facility]  Insulin regimen:   insulin aspart U-100  10 Units Subcutaneous TIDWM    insulin detemir U-100  30 Units Subcutaneous QHS   Diet:  2200 Calorie; Cardiac (Low Na/Chol)    Discharge Procedure Orders   Diet Adult Regular     Order Specific Question Answer Comments   Additional restrictions: Diabetic 2200      Notify your health care provider if you experience any of the following:  temperature >100.4     Notify your health care provider if you experience any of the following:  persistent nausea and vomiting or diarrhea     Notify your health care provider if you experience any of the following:  difficulty breathing or increased cough     Notify your health care provider if you experience any of the following:  persistent dizziness, light-headedness, or visual disturbances     Notify your health care provider if you experience any of the following:  increased confusion or weakness     Activity as tolerated       Discharge Condition: stable    Disposition: Home-Health Care [facility] [place] [facility]    Indwelling Lines/Drains at time of discharge: none    Tests pending at the time of discharge: none       Time spent on the discharge of the patient including review of hospital course with the patient, reviewing discharge medications and arranging follow-up care: 50 minutes.    Discharge examination Patient was seen and examined on 1/14/2019 and determined to be suitable for discharge.    Discharge plan and follow up:  Follow-up Information     MINGO Garvin MD. Schedule an appointment as soon as possible for a visit in 1 week.    Specialty:  Internal Medicine  Why:  For discharge from hospital follow up  Contact  information:  [address]  [address]  [facility] [address]  [phone]                 No future appointments.    Provider  Sheryl Manuel MD  Department of Hospital Medicine  [facility]

## 2019-01-14 NOTE — PLAN OF CARE
01/14/19 1014   Discharge Reassessment   Assessment Type Discharge Planning Reassessment   Provided patient/caregiver education on the expected discharge date and the discharge plan Yes   Do you have any problems affording any of your prescribed medications? No   Discharge Plan A Home;Home Health   Discharge Plan B Home with family   DME Needed Upon Discharge  other (see comments)  (TBD)   Patient choice form signed by patient/caregiver N/A   Anticipated Discharge Disposition Home-Health   Can the patient answer the patient profile reliably? No, cognitively impaired   How does the patient rate their overall health at the present time? Fair       CM met with the patient at the bedside. He is pleasantly confused. Plan for D/C at this time is Home with Home Health. CM placed name and number on board at Bedside and patient was instructed to call with any D/C needs or questions.    1319 -  CM attempted to call Spouse to discuss the possibility of the patient going Home with Home Health and the number in the patient's chart is incorrect. CM then called the Soila, the daughter, and got no answer. Voicemail left for the daughter to call at her earliest convenience.

## 2019-01-14 NOTE — PLAN OF CARE
EMILE sent home health referral to [facility] to be arranged.     Nenita Armijo, SHANICE  [facility]

## 2019-01-15 NOTE — PLAN OF CARE
01/15/19 0724   Final Note   Assessment Type Final Discharge Note   Anticipated Discharge Disposition Home-Health   What phone number can be called within the next 1-3 days to see how you are doing after discharge? [phone]   Hospital Follow Up  Appt(s) scheduled? No  ([facility])   Discharge plans and expectations educations in teach back method with documentation complete? Yes   Right Care Referral Info   Post Acute Recommendation Home-care

## 2019-01-15 NOTE — NURSING
Patient received discharge orders. Discharge instructions reviewed with family and patient. Patent demonstrated understanding of discharge instructions. IV removed.

## 2019-01-22 ENCOUNTER — OFFICE VISIT (OUTPATIENT)
Dept: INTERNAL MEDICINE | Facility: CLINIC | Age: 74
End: 2019-01-22
Attending: INTERNAL MEDICINE
Payer: MEDICARE

## 2019-01-22 VITALS
OXYGEN SATURATION: 99 % | BODY MASS INDEX: 26.9 KG/M2 | SYSTOLIC BLOOD PRESSURE: 120 MMHG | DIASTOLIC BLOOD PRESSURE: 70 MMHG | HEART RATE: 81 BPM | WEIGHT: 203 LBS | HEIGHT: 73 IN

## 2019-01-22 DIAGNOSIS — G35 MS (MULTIPLE SCLEROSIS): ICD-10-CM

## 2019-01-22 DIAGNOSIS — I10 ESSENTIAL HYPERTENSION: ICD-10-CM

## 2019-01-22 DIAGNOSIS — E11.8 TYPE 2 DIABETES MELLITUS WITH COMPLICATION, WITHOUT LONG-TERM CURRENT USE OF INSULIN: Primary | Chronic | ICD-10-CM

## 2019-01-22 DIAGNOSIS — E78.00 HIGH CHOLESTEROL: ICD-10-CM

## 2019-01-22 PROCEDURE — 99214 PR OFFICE/OUTPT VISIT, EST, LEVL IV, 30-39 MIN: ICD-10-PCS | Mod: S$GLB,,, | Performed by: INTERNAL MEDICINE

## 2019-01-22 PROCEDURE — 99499 RISK ADDL DX/OHS AUDIT: ICD-10-PCS | Mod: S$GLB,,, | Performed by: INTERNAL MEDICINE

## 2019-01-22 PROCEDURE — 99499 UNLISTED E&M SERVICE: CPT | Mod: S$GLB,,, | Performed by: INTERNAL MEDICINE

## 2019-01-22 PROCEDURE — 99214 OFFICE O/P EST MOD 30 MIN: CPT | Mod: S$GLB,,, | Performed by: INTERNAL MEDICINE

## 2019-01-22 RX ORDER — INSULIN ASPART 100 [IU]/ML
10 INJECTION, SOLUTION INTRAVENOUS; SUBCUTANEOUS
COMMUNITY
End: 2019-10-03 | Stop reason: SDUPTHER

## 2019-01-22 NOTE — PROGRESS NOTES
Subjective:       Patient ID: Vicente Luciano is a 73 y.o. male.    Chief Complaint: Annual Exam and Blood Sugar Problem (seen in ER (notes in Epic))    Last seem by me on 5/31/16. [facility]      Hospital Follow-up:    Admit date: 1/8/19  Discharge date: 1/14/19  Hospital D/c for: AMS and hyperglycemia    Patient was called within 2 days of hospital discharge and made a follow-up appt with me within 14 calender days of discharge.       Family and/or Caretaker present at visit?  Yes. Wife  Diagnostic tests reviewed/disposition:  Yes.  Disease/illness education: Yes.   Home health/community services discussion/referrals: [facility].  Establishment or re-establishment of referral orders for community resources: .   Discussion with other health care providers: Note in EPIC for review by other healthcare providers.      Seen by Neuro. They didn't think his MS was active.  Has HHC.  Recent Glu=.  Not taking Tradjenta.         Diabetes   He presents for his follow-up diabetic visit. He has type 2 diabetes mellitus. His disease course has been improving. Associated symptoms include weakness.   Hypertension   This is a chronic problem. The current episode started more than 1 year ago. The problem is controlled.     Review of Systems   Respiratory: Negative.    Cardiovascular: Negative.    Neurological: Positive for weakness.   Psychiatric/Behavioral: Negative for dysphoric mood.       Objective:      Physical Exam   Constitutional: He appears well-developed and well-nourished.   HENT:   Head: Normocephalic and atraumatic.   Eyes: Pupils are equal, round, and reactive to light.   Cardiovascular: Normal rate, regular rhythm and normal heart sounds.   Pulmonary/Chest: Effort normal.   Musculoskeletal: He exhibits no edema.   Neurological: He is alert.       Assessment:       1. Type 2 diabetes mellitus with complication, without long-term current use of insulin    2. Essential hypertension    3. MS (multiple  sclerosis)    4. High cholesterol        Plan:       Per orders and D/C instructions.  Continue meds/diet for HTN, and high cholest. which are stable.     D/c Glipizide to prevent hypoglycemia.  F/u with Neuro for MS.

## 2019-04-29 ENCOUNTER — APPOINTMENT (RX ONLY)
Dept: URBAN - NONMETROPOLITAN AREA CLINIC 13 | Facility: CLINIC | Age: 74
Setting detail: DERMATOLOGY
End: 2019-04-29

## 2019-04-29 DIAGNOSIS — D18.0 HEMANGIOMA: ICD-10-CM

## 2019-04-29 DIAGNOSIS — L57.8 OTHER SKIN CHANGES DUE TO CHRONIC EXPOSURE TO NONIONIZING RADIATION: ICD-10-CM

## 2019-04-29 DIAGNOSIS — L57.0 ACTINIC KERATOSIS: ICD-10-CM

## 2019-04-29 DIAGNOSIS — L82.1 OTHER SEBORRHEIC KERATOSIS: ICD-10-CM

## 2019-04-29 DIAGNOSIS — D22 MELANOCYTIC NEVI: ICD-10-CM

## 2019-04-29 DIAGNOSIS — Z80.8 FAMILY HISTORY OF MALIGNANT NEOPLASM OF OTHER ORGANS OR SYSTEMS: ICD-10-CM

## 2019-04-29 PROBLEM — D22.61 MELANOCYTIC NEVI OF RIGHT UPPER LIMB, INCLUDING SHOULDER: Status: ACTIVE | Noted: 2019-04-29

## 2019-04-29 PROBLEM — D22.5 MELANOCYTIC NEVI OF TRUNK: Status: ACTIVE | Noted: 2019-04-29

## 2019-04-29 PROBLEM — D18.01 HEMANGIOMA OF SKIN AND SUBCUTANEOUS TISSUE: Status: ACTIVE | Noted: 2019-04-29

## 2019-04-29 PROBLEM — D22.62 MELANOCYTIC NEVI OF LEFT UPPER LIMB, INCLUDING SHOULDER: Status: ACTIVE | Noted: 2019-04-29

## 2019-04-29 PROCEDURE — 99213 OFFICE O/P EST LOW 20 MIN: CPT | Mod: 25

## 2019-04-29 PROCEDURE — 17004 DESTROY PREMAL LESIONS 15/>: CPT

## 2019-04-29 PROCEDURE — ? LIQUID NITROGEN

## 2019-04-29 PROCEDURE — ? COUNSELING

## 2019-04-29 PROCEDURE — ? ADDITIONAL NOTES

## 2019-04-29 ASSESSMENT — LOCATION DETAILED DESCRIPTION DERM
LOCATION DETAILED: RIGHT PROXIMAL POSTERIOR UPPER ARM
LOCATION DETAILED: LEFT DISTAL RADIAL DORSAL FOREARM
LOCATION DETAILED: LEFT DISTAL POSTERIOR UPPER ARM
LOCATION DETAILED: LEFT LATERAL MALAR CHEEK
LOCATION DETAILED: EPIGASTRIC SKIN
LOCATION DETAILED: RIGHT INFERIOR PREAURICULAR CHEEK
LOCATION DETAILED: RIGHT DISTAL POSTERIOR UPPER ARM
LOCATION DETAILED: RIGHT VENTRAL PROXIMAL FOREARM
LOCATION DETAILED: RIGHT SUPERIOR UPPER BACK
LOCATION DETAILED: SUBMENTAL CHIN
LOCATION DETAILED: RIGHT MID TEMPLE
LOCATION DETAILED: RIGHT SUPERIOR PREAURICULAR CHEEK
LOCATION DETAILED: RIGHT ULNAR DORSAL HAND
LOCATION DETAILED: RIGHT ANTERIOR DISTAL UPPER ARM
LOCATION DETAILED: LEFT LATERAL FOREHEAD
LOCATION DETAILED: LEFT INFERIOR LATERAL FOREHEAD
LOCATION DETAILED: LEFT VENTRAL PROXIMAL FOREARM
LOCATION DETAILED: RIGHT INFERIOR CENTRAL MALAR CHEEK
LOCATION DETAILED: LEFT ULNAR DORSAL HAND
LOCATION DETAILED: SUPERIOR THORACIC SPINE
LOCATION DETAILED: INFERIOR THORACIC SPINE
LOCATION DETAILED: LEFT ANTERIOR PROXIMAL UPPER ARM
LOCATION DETAILED: 1ST WEB SPACE RIGHT HAND
LOCATION DETAILED: LEFT DISTAL DORSAL FOREARM
LOCATION DETAILED: RIGHT DISTAL RADIAL DORSAL FOREARM
LOCATION DETAILED: LEFT PROXIMAL RADIAL DORSAL FOREARM
LOCATION DETAILED: LEFT PROXIMAL POSTERIOR UPPER ARM

## 2019-04-29 ASSESSMENT — LOCATION SIMPLE DESCRIPTION DERM
LOCATION SIMPLE: RIGHT CHEEK
LOCATION SIMPLE: RIGHT UPPER ARM
LOCATION SIMPLE: RIGHT FOREARM
LOCATION SIMPLE: RIGHT TEMPLE
LOCATION SIMPLE: LEFT FOREARM
LOCATION SIMPLE: LEFT CHEEK
LOCATION SIMPLE: SUBMENTAL CHIN
LOCATION SIMPLE: RIGHT THUMB
LOCATION SIMPLE: UPPER BACK
LOCATION SIMPLE: RIGHT UPPER BACK
LOCATION SIMPLE: RIGHT HAND
LOCATION SIMPLE: LEFT FOREHEAD
LOCATION SIMPLE: ABDOMEN
LOCATION SIMPLE: LEFT UPPER ARM
LOCATION SIMPLE: LEFT HAND

## 2019-04-29 ASSESSMENT — LOCATION ZONE DERM
LOCATION ZONE: TRUNK
LOCATION ZONE: ARM
LOCATION ZONE: HAND
LOCATION ZONE: FACE
LOCATION ZONE: FINGER

## 2019-04-29 NOTE — PROCEDURE: LIQUID NITROGEN
Render Note In Bullet Format When Appropriate: No
Post-Care Instructions: I reviewed with the patient in detail post-care instructions. Patient is to wear sunprotection, and avoid picking at any of the treated lesions. Pt may apply Vaseline to crusted or scabbing areas.  They were told if any of the lesions fail to resolve to f/u and have them evaluated.
Number Of Freeze-Thaw Cycles: 1 freeze-thaw cycle
Detail Level: Detailed
Duration Of Freeze Thaw-Cycle (Seconds): 1
Consent: The patient's consent was obtained including but not limited to risks of crusting, scabbing, blistering, scarring, darker or lighter pigmentary change, recurrence, incomplete removal and infection.
Aperture Size (Optional): C

## 2019-04-29 NOTE — PROCEDURE: MIPS QUALITY
Quality 226: Preventive Care And Screening: Tobacco Use: Screening And Cessation Intervention: Patient screened for tobacco and never smoked
Detail Level: Detailed
Quality 111:Pneumonia Vaccination Status For Older Adults: Pneumococcal Vaccination Previously Received
Quality 226: Preventive Care And Screening: Tobacco Use: Screening And Cessation Intervention: Patient screened for tobacco use and is an ex/non-smoker

## 2019-04-29 NOTE — HPI: EVALUATION OF SKIN LESION(S)
How Severe Are Your Spot(S)?: mild
Have Your Spot(S) Been Treated In The Past?: has not been treated
Hpi Title: Evaluation of Skin Lesions
Additional History: Full body skin exam
Family Member: Father -

## 2019-04-30 ENCOUNTER — LAB VISIT (OUTPATIENT)
Dept: LAB | Facility: OTHER | Age: 74
End: 2019-04-30
Attending: INTERNAL MEDICINE
Payer: MEDICARE

## 2019-04-30 ENCOUNTER — OFFICE VISIT (OUTPATIENT)
Dept: INTERNAL MEDICINE | Facility: CLINIC | Age: 74
End: 2019-04-30
Attending: INTERNAL MEDICINE
Payer: MEDICARE

## 2019-04-30 VITALS
HEIGHT: 73 IN | OXYGEN SATURATION: 96 % | SYSTOLIC BLOOD PRESSURE: 140 MMHG | BODY MASS INDEX: 26.9 KG/M2 | DIASTOLIC BLOOD PRESSURE: 94 MMHG | HEART RATE: 91 BPM | WEIGHT: 203 LBS

## 2019-04-30 DIAGNOSIS — R79.89 OTHER SPECIFIED ABNORMAL FINDINGS OF BLOOD CHEMISTRY: ICD-10-CM

## 2019-04-30 DIAGNOSIS — D51.0 PERNICIOUS ANEMIA: ICD-10-CM

## 2019-04-30 DIAGNOSIS — E03.9 HYPOTHYROIDISM, UNSPECIFIED TYPE: ICD-10-CM

## 2019-04-30 DIAGNOSIS — G35 MS (MULTIPLE SCLEROSIS): ICD-10-CM

## 2019-04-30 DIAGNOSIS — Z00.00 ROUTINE ADULT HEALTH MAINTENANCE: ICD-10-CM

## 2019-04-30 DIAGNOSIS — I25.118 CORONARY ARTERY DISEASE INVOLVING NATIVE CORONARY ARTERY OF NATIVE HEART WITH OTHER FORM OF ANGINA PECTORIS: ICD-10-CM

## 2019-04-30 DIAGNOSIS — Z12.5 SCREENING FOR PROSTATE CANCER: ICD-10-CM

## 2019-04-30 DIAGNOSIS — M54.2 NECK PAIN: ICD-10-CM

## 2019-04-30 DIAGNOSIS — I10 ESSENTIAL HYPERTENSION: ICD-10-CM

## 2019-04-30 DIAGNOSIS — E78.00 HIGH CHOLESTEROL: ICD-10-CM

## 2019-04-30 DIAGNOSIS — E78.9 DISORDER OF LIPID METABOLISM: ICD-10-CM

## 2019-04-30 DIAGNOSIS — E55.9 VITAMIN D DEFICIENCY: ICD-10-CM

## 2019-04-30 DIAGNOSIS — Z13.31 SCREENING FOR DEPRESSION: ICD-10-CM

## 2019-04-30 DIAGNOSIS — Z13.39 SCREENING FOR ALCOHOLISM: ICD-10-CM

## 2019-04-30 DIAGNOSIS — D50.8 OTHER IRON DEFICIENCY ANEMIA: ICD-10-CM

## 2019-04-30 DIAGNOSIS — E11.8 TYPE 2 DIABETES MELLITUS WITH COMPLICATION, WITHOUT LONG-TERM CURRENT USE OF INSULIN: Primary | Chronic | ICD-10-CM

## 2019-04-30 LAB
25(OH)D3+25(OH)D2 SERPL-MCNC: 18 NG/ML (ref 30–96)
ALBUMIN SERPL BCP-MCNC: 4.2 G/DL (ref 3.5–5.2)
ALP SERPL-CCNC: 97 U/L (ref 55–135)
ALT SERPL W/O P-5'-P-CCNC: 21 U/L (ref 10–44)
ANION GAP SERPL CALC-SCNC: 7 MMOL/L (ref 8–16)
AST SERPL-CCNC: 17 U/L (ref 10–40)
BASOPHILS # BLD AUTO: 0.02 K/UL (ref 0–0.2)
BASOPHILS NFR BLD: 0.4 % (ref 0–1.9)
BILIRUB SERPL-MCNC: 0.5 MG/DL (ref 0.1–1)
BUN SERPL-MCNC: 14 MG/DL (ref 8–23)
CALCIUM SERPL-MCNC: 10.9 MG/DL (ref 8.7–10.5)
CHLORIDE SERPL-SCNC: 106 MMOL/L (ref 95–110)
CHOLEST SERPL-MCNC: 170 MG/DL (ref 120–199)
CHOLEST/HDLC SERPL: 4.7 {RATIO} (ref 2–5)
CK SERPL-CCNC: 136 U/L (ref 20–200)
CO2 SERPL-SCNC: 29 MMOL/L (ref 23–29)
COMPLEXED PSA SERPL-MCNC: 0.77 NG/ML (ref 0–4)
CREAT SERPL-MCNC: 1.2 MG/DL (ref 0.5–1.4)
DIFFERENTIAL METHOD: NORMAL
EOSINOPHIL # BLD AUTO: 0.1 K/UL (ref 0–0.5)
EOSINOPHIL NFR BLD: 2.6 % (ref 0–8)
ERYTHROCYTE [DISTWIDTH] IN BLOOD BY AUTOMATED COUNT: 14 % (ref 11.5–14.5)
EST. GFR  (AFRICAN AMERICAN): >60 ML/MIN/1.73 M^2
EST. GFR  (NON AFRICAN AMERICAN): >60 ML/MIN/1.73 M^2
ESTIMATED AVG GLUCOSE: 194 MG/DL (ref 68–131)
GLUCOSE SERPL-MCNC: 291 MG/DL (ref 70–110)
HBA1C MFR BLD HPLC: 8.4 % (ref 4–5.6)
HCT VFR BLD AUTO: 42.9 % (ref 40–54)
HDLC SERPL-MCNC: 36 MG/DL (ref 40–75)
HDLC SERPL: 21.2 % (ref 20–50)
HGB BLD-MCNC: 15.1 G/DL (ref 14–18)
LDLC SERPL CALC-MCNC: 96.6 MG/DL (ref 63–159)
LYMPHOCYTES # BLD AUTO: 1.5 K/UL (ref 1–4.8)
LYMPHOCYTES NFR BLD: 30 % (ref 18–48)
MCH RBC QN AUTO: 30.2 PG (ref 27–31)
MCHC RBC AUTO-ENTMCNC: 35.2 G/DL (ref 32–36)
MCV RBC AUTO: 86 FL (ref 82–98)
MONOCYTES # BLD AUTO: 0.4 K/UL (ref 0.3–1)
MONOCYTES NFR BLD: 8.9 % (ref 4–15)
NEUTROPHILS # BLD AUTO: 2.9 K/UL (ref 1.8–7.7)
NEUTROPHILS NFR BLD: 57.7 % (ref 38–73)
NONHDLC SERPL-MCNC: 134 MG/DL
PLATELET # BLD AUTO: 169 K/UL (ref 150–350)
PMV BLD AUTO: 10.5 FL (ref 9.2–12.9)
POTASSIUM SERPL-SCNC: 4.3 MMOL/L (ref 3.5–5.1)
PROT SERPL-MCNC: 7.2 G/DL (ref 6–8.4)
RBC # BLD AUTO: 5 M/UL (ref 4.6–6.2)
SODIUM SERPL-SCNC: 142 MMOL/L (ref 136–145)
TRIGL SERPL-MCNC: 187 MG/DL (ref 30–150)
TSH SERPL DL<=0.005 MIU/L-ACNC: 1.44 UIU/ML (ref 0.4–4)
VIT B12 SERPL-MCNC: 414 PG/ML (ref 210–950)
WBC # BLD AUTO: 4.96 K/UL (ref 3.9–12.7)

## 2019-04-30 PROCEDURE — 3077F SYST BP >= 140 MM HG: CPT | Mod: CPTII,S$GLB,, | Performed by: INTERNAL MEDICINE

## 2019-04-30 PROCEDURE — 36415 COLL VENOUS BLD VENIPUNCTURE: CPT

## 2019-04-30 PROCEDURE — 80061 LIPID PANEL: CPT

## 2019-04-30 PROCEDURE — 85025 COMPLETE CBC W/AUTO DIFF WBC: CPT

## 2019-04-30 PROCEDURE — G0444 DEPRESSION SCREEN ANNUAL: HCPCS | Mod: 59,S$GLB,, | Performed by: INTERNAL MEDICINE

## 2019-04-30 PROCEDURE — 3080F PR MOST RECENT DIASTOLIC BLOOD PRESSURE >= 90 MM HG: ICD-10-PCS | Mod: CPTII,S$GLB,, | Performed by: INTERNAL MEDICINE

## 2019-04-30 PROCEDURE — G0442 ANNUAL ALCOHOL SCREEN 15 MIN: HCPCS | Mod: 59,S$GLB,, | Performed by: INTERNAL MEDICINE

## 2019-04-30 PROCEDURE — 3046F HEMOGLOBIN A1C LEVEL >9.0%: CPT | Mod: CPTII,S$GLB,, | Performed by: INTERNAL MEDICINE

## 2019-04-30 PROCEDURE — 99214 OFFICE O/P EST MOD 30 MIN: CPT | Mod: 25,S$GLB,, | Performed by: INTERNAL MEDICINE

## 2019-04-30 PROCEDURE — 82550 ASSAY OF CK (CPK): CPT

## 2019-04-30 PROCEDURE — G0444 PR DEPRESSION SCREENING: ICD-10-PCS | Mod: 59,S$GLB,, | Performed by: INTERNAL MEDICINE

## 2019-04-30 PROCEDURE — 82607 VITAMIN B-12: CPT

## 2019-04-30 PROCEDURE — 99499 RISK ADDL DX/OHS AUDIT: ICD-10-PCS | Mod: S$GLB,,, | Performed by: INTERNAL MEDICINE

## 2019-04-30 PROCEDURE — 3077F PR MOST RECENT SYSTOLIC BLOOD PRESSURE >= 140 MM HG: ICD-10-PCS | Mod: CPTII,S$GLB,, | Performed by: INTERNAL MEDICINE

## 2019-04-30 PROCEDURE — 82306 VITAMIN D 25 HYDROXY: CPT

## 2019-04-30 PROCEDURE — 99499 UNLISTED E&M SERVICE: CPT | Mod: S$GLB,,, | Performed by: INTERNAL MEDICINE

## 2019-04-30 PROCEDURE — 80053 COMPREHEN METABOLIC PANEL: CPT

## 2019-04-30 PROCEDURE — 84153 ASSAY OF PSA TOTAL: CPT

## 2019-04-30 PROCEDURE — 84443 ASSAY THYROID STIM HORMONE: CPT

## 2019-04-30 PROCEDURE — 3046F PR MOST RECENT HEMOGLOBIN A1C LEVEL > 9.0%: ICD-10-PCS | Mod: CPTII,S$GLB,, | Performed by: INTERNAL MEDICINE

## 2019-04-30 PROCEDURE — G0442 PR  ALCOHOL SCREENING: ICD-10-PCS | Mod: 59,S$GLB,, | Performed by: INTERNAL MEDICINE

## 2019-04-30 PROCEDURE — 83036 HEMOGLOBIN GLYCOSYLATED A1C: CPT

## 2019-04-30 PROCEDURE — 3080F DIAST BP >= 90 MM HG: CPT | Mod: CPTII,S$GLB,, | Performed by: INTERNAL MEDICINE

## 2019-04-30 PROCEDURE — 99214 PR OFFICE/OUTPT VISIT, EST, LEVL IV, 30-39 MIN: ICD-10-PCS | Mod: 25,S$GLB,, | Performed by: INTERNAL MEDICINE

## 2019-04-30 RX ORDER — SERTRALINE HYDROCHLORIDE 100 MG/1
100 TABLET, FILM COATED ORAL DAILY
Qty: 90 TABLET | Refills: 3 | Status: SHIPPED | OUTPATIENT
Start: 2019-04-30 | End: 2019-10-03 | Stop reason: SDUPTHER

## 2019-04-30 RX ORDER — TIZANIDINE 4 MG/1
TABLET ORAL
Qty: 30 TABLET | Refills: 2 | Status: SHIPPED | OUTPATIENT
Start: 2019-04-30 | End: 2020-01-13

## 2019-04-30 NOTE — PROGRESS NOTES
Subjective:       Patient ID: Vicente Luciano is a 73 y.o. male.    Chief Complaint: Follow-up (3 month / refill zoloft ); Shoulder Pain (bilat.); and Joint Swelling (right)    Neck feels tight.    Diabetes   He presents for his follow-up diabetic visit. He has type 2 diabetes mellitus. His disease course has been fluctuating.   Hypertension   This is a chronic problem. The current episode started more than 1 year ago. The problem has been waxing and waning since onset.     Review of Systems   Constitutional: Negative.    Respiratory: Negative.    Cardiovascular: Negative.    Musculoskeletal: Positive for neck stiffness.   Psychiatric/Behavioral: Negative for dysphoric mood.       Objective:      Physical Exam   Constitutional: He appears well-developed and well-nourished.   HENT:   Head: Normocephalic and atraumatic.   Eyes: Pupils are equal, round, and reactive to light.   Cardiovascular: Normal rate, regular rhythm and normal heart sounds.   Pulmonary/Chest: Effort normal.   Musculoskeletal: He exhibits no edema.   Neurological: He is alert.       Assessment:       1. Type 2 diabetes mellitus with complication, without long-term current use of insulin    2. Essential hypertension    3. MS (multiple sclerosis)    4. Coronary artery disease involving native coronary artery of native heart with other form of angina pectoris    5. High cholesterol    6. Neck pain    7. Routine adult health maintenance    8. Screening for depression    9. Screening for alcoholism        Plan:       Per orders and D/C instructions.  Continue meds/diet for CAD, DM, HTN, and high cholest. which are stable.     Try Tiazanadine for neck pain.  F/u with Neuro for MS.  Check labs.    Screening: The patient was screened for depression with the PHQ2 questionnaire and possible health consequences were discussed with the patient, who understands (15 minutes spent). The patient was screened for the misuse of alcohol, by asking the number of  drinks per average week, and if pt has had more than 4 drinks (more than 3 for women and elderly) in 1 day within the past year. The health and legal consequences of misuse were discussed (15 minutes spent). The patient was screened for obesity (BMI>30), If the current BMI > 30, then the possible consequences of obesity, as well as the benefits of diet, exercise, and weight loss were discussed. Any behavioral risks were identified, and methods to achieve appropriate treatment goals were discussed (15 minutes spent).

## 2019-05-15 PROBLEM — Z92.89 HISTORY OF NUCLEAR STRESS TEST: Status: ACTIVE | Noted: 2019-05-15

## 2019-05-15 PROBLEM — I51.89 GRADE I DIASTOLIC DYSFUNCTION: Status: ACTIVE | Noted: 2019-05-15

## 2019-08-28 ENCOUNTER — APPOINTMENT (RX ONLY)
Dept: URBAN - NONMETROPOLITAN AREA CLINIC 4 | Facility: CLINIC | Age: 74
Setting detail: DERMATOLOGY
End: 2019-08-28

## 2019-08-28 DIAGNOSIS — L82.1 OTHER SEBORRHEIC KERATOSIS: ICD-10-CM

## 2019-08-28 DIAGNOSIS — L57.8 OTHER SKIN CHANGES DUE TO CHRONIC EXPOSURE TO NONIONIZING RADIATION: ICD-10-CM

## 2019-08-28 DIAGNOSIS — D18.0 HEMANGIOMA: ICD-10-CM

## 2019-08-28 DIAGNOSIS — Z80.8 FAMILY HISTORY OF MALIGNANT NEOPLASM OF OTHER ORGANS OR SYSTEMS: ICD-10-CM

## 2019-08-28 DIAGNOSIS — D22 MELANOCYTIC NEVI: ICD-10-CM

## 2019-08-28 DIAGNOSIS — L57.0 ACTINIC KERATOSIS: ICD-10-CM

## 2019-08-28 PROBLEM — C44.329 SQUAMOUS CELL CARCINOMA OF SKIN OF OTHER PARTS OF FACE: Status: ACTIVE | Noted: 2019-08-28

## 2019-08-28 PROBLEM — D22.61 MELANOCYTIC NEVI OF RIGHT UPPER LIMB, INCLUDING SHOULDER: Status: ACTIVE | Noted: 2019-08-28

## 2019-08-28 PROBLEM — D22.5 MELANOCYTIC NEVI OF TRUNK: Status: ACTIVE | Noted: 2019-08-28

## 2019-08-28 PROBLEM — D18.01 HEMANGIOMA OF SKIN AND SUBCUTANEOUS TISSUE: Status: ACTIVE | Noted: 2019-08-28

## 2019-08-28 PROBLEM — D22.62 MELANOCYTIC NEVI OF LEFT UPPER LIMB, INCLUDING SHOULDER: Status: ACTIVE | Noted: 2019-08-28

## 2019-08-28 PROCEDURE — 99214 OFFICE O/P EST MOD 30 MIN: CPT | Mod: 25

## 2019-08-28 PROCEDURE — ? BIOPSY BY SHAVE METHOD AND DESTRUCTION

## 2019-08-28 PROCEDURE — 17280 DSTR MAL LS F/E/E/N/L/M .5/<: CPT | Mod: 59

## 2019-08-28 PROCEDURE — ? ADDITIONAL NOTES

## 2019-08-28 PROCEDURE — ? COUNSELING

## 2019-08-28 PROCEDURE — 17004 DESTROY PREMAL LESIONS 15/>: CPT

## 2019-08-28 PROCEDURE — ? LIQUID NITROGEN

## 2019-08-28 ASSESSMENT — LOCATION ZONE DERM
LOCATION ZONE: FINGER
LOCATION ZONE: HAND
LOCATION ZONE: ARM
LOCATION ZONE: TRUNK
LOCATION ZONE: NOSE
LOCATION ZONE: FACE

## 2019-08-28 ASSESSMENT — LOCATION DETAILED DESCRIPTION DERM
LOCATION DETAILED: LEFT PROXIMAL POSTERIOR UPPER ARM
LOCATION DETAILED: LEFT DISTAL RADIAL DORSAL FOREARM
LOCATION DETAILED: RIGHT RADIAL DORSAL HAND
LOCATION DETAILED: EPIGASTRIC SKIN
LOCATION DETAILED: LEFT PROXIMAL ULNAR THUMB
LOCATION DETAILED: LEFT PROXIMAL RADIAL DORSAL FOREARM
LOCATION DETAILED: RIGHT DISTAL POSTERIOR UPPER ARM
LOCATION DETAILED: RIGHT ANTERIOR DISTAL UPPER ARM
LOCATION DETAILED: LEFT VENTRAL PROXIMAL FOREARM
LOCATION DETAILED: RIGHT PROXIMAL POSTERIOR UPPER ARM
LOCATION DETAILED: RIGHT NASAL SIDEWALL
LOCATION DETAILED: LEFT PROXIMAL DORSAL FOREARM
LOCATION DETAILED: RIGHT DISTAL RADIAL DORSAL FOREARM
LOCATION DETAILED: LEFT DISTAL LATERAL POSTERIOR UPPER ARM
LOCATION DETAILED: LEFT RADIAL DORSAL HAND
LOCATION DETAILED: RIGHT VENTRAL DISTAL FOREARM
LOCATION DETAILED: LEFT INFERIOR FOREHEAD
LOCATION DETAILED: LEFT ANTERIOR PROXIMAL UPPER ARM
LOCATION DETAILED: LEFT DISTAL POSTERIOR UPPER ARM
LOCATION DETAILED: RIGHT SUPERIOR UPPER BACK
LOCATION DETAILED: SUPERIOR THORACIC SPINE
LOCATION DETAILED: RIGHT VENTRAL PROXIMAL FOREARM
LOCATION DETAILED: RIGHT PROXIMAL RADIAL DORSAL FOREARM
LOCATION DETAILED: RIGHT LATERAL ELBOW
LOCATION DETAILED: INFERIOR THORACIC SPINE

## 2019-08-28 ASSESSMENT — LOCATION SIMPLE DESCRIPTION DERM
LOCATION SIMPLE: ABDOMEN
LOCATION SIMPLE: LEFT FOREHEAD
LOCATION SIMPLE: RIGHT FOREARM
LOCATION SIMPLE: LEFT FOREARM
LOCATION SIMPLE: LEFT THUMB
LOCATION SIMPLE: RIGHT ELBOW
LOCATION SIMPLE: RIGHT UPPER ARM
LOCATION SIMPLE: LEFT HAND
LOCATION SIMPLE: UPPER BACK
LOCATION SIMPLE: RIGHT UPPER BACK
LOCATION SIMPLE: RIGHT HAND
LOCATION SIMPLE: LEFT UPPER ARM
LOCATION SIMPLE: RIGHT NOSE

## 2019-08-28 NOTE — PROCEDURE: BIOPSY BY SHAVE METHOD AND DESTRUCTION
Bill 22699 For Specimen Handling/Conveyance To Laboratory?: no
Detail Level: Detailed
Lab: [id number]
Size Of Lesion In Cm (Optional): 0.2
Biopsy Type: H and E
Destruction Type: curettage
Billing Type: Third-Party Bill
Anesthesia Type: 1% lidocaine with epinephrine
Anesthesia Volume In Cc: 0.5
Number Of Curettages: 2
Notification Instructions: Patient will be notified of biopsy results. However, patient instructed to call the office if not contacted within 2 weeks.
Size Of Lesion After Curettage: 0.4
Lab Facility: [id number]
Post-Care Instructions: I reviewed with the patient in detail post-care instructions. Patient is to keep the biopsy site dry overnight, and then apply Vaseline twice daily until healed. Patient to call if concerns of infection with tenderness in the area.
Wound Care: Petrolatum
Consent: Verbal consent was obtained and risks were reviewed including but not limited to scarring, infection, bleeding, scabbing, incomplete removal, nerve damage and allergy to anesthesia.
Hemostasis: Aluminum Chloride and Electrocautery
Bill As?: Malignant Destruction

## 2019-08-28 NOTE — PROCEDURE: LIQUID NITROGEN
Render Post-Care Instructions In Note?: no
Detail Level: Detailed
Aperture Size (Optional): C
Duration Of Freeze Thaw-Cycle (Seconds): 1
Post-Care Instructions: I reviewed with the patient in detail post-care instructions. Patient is to wear sunprotection, and avoid picking at any of the treated lesions. Pt may apply Vaseline to crusted or scabbing areas.  They were told if any of the lesions fail to resolve to f/u and have them evaluated.
Number Of Freeze-Thaw Cycles: 1 freeze-thaw cycle
Consent: The patient's consent was obtained including but not limited to risks of crusting, scabbing, blistering, scarring, darker or lighter pigmentary change, recurrence, incomplete removal and infection.

## 2019-08-28 NOTE — HPI: EVALUATION OF SKIN LESION(S)
How Severe Are Your Spot(S)?: mild
Have Your Spot(S) Been Treated In The Past?: has not been treated
Hpi Title: Evaluation of Skin Lesions
Additional History: Upper body skin exam
Family Member: Father

## 2019-10-03 ENCOUNTER — LAB VISIT (OUTPATIENT)
Dept: LAB | Facility: OTHER | Age: 74
End: 2019-10-03
Attending: INTERNAL MEDICINE
Payer: MEDICARE

## 2019-10-03 ENCOUNTER — OFFICE VISIT (OUTPATIENT)
Dept: INTERNAL MEDICINE | Facility: CLINIC | Age: 74
End: 2019-10-03
Attending: INTERNAL MEDICINE
Payer: MEDICARE

## 2019-10-03 VITALS
SYSTOLIC BLOOD PRESSURE: 150 MMHG | DIASTOLIC BLOOD PRESSURE: 82 MMHG | BODY MASS INDEX: 26.51 KG/M2 | OXYGEN SATURATION: 97 % | HEIGHT: 73 IN | HEART RATE: 93 BPM | WEIGHT: 200 LBS

## 2019-10-03 DIAGNOSIS — D51.0 PERNICIOUS ANEMIA: ICD-10-CM

## 2019-10-03 DIAGNOSIS — E11.8 TYPE 2 DIABETES MELLITUS WITH COMPLICATION, WITHOUT LONG-TERM CURRENT USE OF INSULIN: Primary | Chronic | ICD-10-CM

## 2019-10-03 DIAGNOSIS — R79.89 OTHER SPECIFIED ABNORMAL FINDINGS OF BLOOD CHEMISTRY: ICD-10-CM

## 2019-10-03 DIAGNOSIS — Z12.11 COLON CANCER SCREENING: ICD-10-CM

## 2019-10-03 DIAGNOSIS — Z12.5 SCREENING FOR PROSTATE CANCER: ICD-10-CM

## 2019-10-03 DIAGNOSIS — E55.9 VITAMIN D DEFICIENCY: ICD-10-CM

## 2019-10-03 DIAGNOSIS — I10 ESSENTIAL HYPERTENSION: ICD-10-CM

## 2019-10-03 DIAGNOSIS — E78.00 HIGH CHOLESTEROL: ICD-10-CM

## 2019-10-03 DIAGNOSIS — Z91.148 NON COMPLIANCE W MEDICATION REGIMEN: ICD-10-CM

## 2019-10-03 DIAGNOSIS — E78.9 DISORDER OF LIPID METABOLISM: ICD-10-CM

## 2019-10-03 DIAGNOSIS — E03.9 HYPOTHYROIDISM, UNSPECIFIED TYPE: ICD-10-CM

## 2019-10-03 DIAGNOSIS — D50.8 OTHER IRON DEFICIENCY ANEMIA: ICD-10-CM

## 2019-10-03 DIAGNOSIS — G35 MS (MULTIPLE SCLEROSIS): ICD-10-CM

## 2019-10-03 DIAGNOSIS — I25.118 CORONARY ARTERY DISEASE INVOLVING NATIVE CORONARY ARTERY OF NATIVE HEART WITH OTHER FORM OF ANGINA PECTORIS: ICD-10-CM

## 2019-10-03 LAB
ALBUMIN SERPL BCP-MCNC: 4.3 G/DL (ref 3.5–5.2)
ALP SERPL-CCNC: 85 U/L (ref 55–135)
ALT SERPL W/O P-5'-P-CCNC: 15 U/L (ref 10–44)
ANION GAP SERPL CALC-SCNC: 9 MMOL/L (ref 8–16)
AST SERPL-CCNC: 17 U/L (ref 10–40)
BILIRUB SERPL-MCNC: 0.3 MG/DL (ref 0.1–1)
BUN SERPL-MCNC: 13 MG/DL (ref 8–23)
CALCIUM SERPL-MCNC: 10.2 MG/DL (ref 8.7–10.5)
CHLORIDE SERPL-SCNC: 107 MMOL/L (ref 95–110)
CO2 SERPL-SCNC: 26 MMOL/L (ref 23–29)
CREAT SERPL-MCNC: 1.2 MG/DL (ref 0.5–1.4)
EST. GFR  (AFRICAN AMERICAN): >60 ML/MIN/1.73 M^2
EST. GFR  (NON AFRICAN AMERICAN): >60 ML/MIN/1.73 M^2
GLUCOSE SERPL-MCNC: 138 MG/DL (ref 70–110)
POTASSIUM SERPL-SCNC: 4.1 MMOL/L (ref 3.5–5.1)
PROT SERPL-MCNC: 7.4 G/DL (ref 6–8.4)
SODIUM SERPL-SCNC: 142 MMOL/L (ref 136–145)

## 2019-10-03 PROCEDURE — G0009 ADMIN PNEUMOCOCCAL VACCINE: HCPCS | Mod: S$GLB,,, | Performed by: INTERNAL MEDICINE

## 2019-10-03 PROCEDURE — 3079F PR MOST RECENT DIASTOLIC BLOOD PRESSURE 80-89 MM HG: ICD-10-PCS | Mod: CPTII,S$GLB,, | Performed by: INTERNAL MEDICINE

## 2019-10-03 PROCEDURE — 3077F PR MOST RECENT SYSTOLIC BLOOD PRESSURE >= 140 MM HG: ICD-10-PCS | Mod: CPTII,S$GLB,, | Performed by: INTERNAL MEDICINE

## 2019-10-03 PROCEDURE — G0008 ADMIN INFLUENZA VIRUS VAC: HCPCS | Mod: S$GLB,,, | Performed by: INTERNAL MEDICINE

## 2019-10-03 PROCEDURE — 3045F PR MOST RECENT HEMOGLOBIN A1C LEVEL 7.0-9.0%: CPT | Mod: CPTII,S$GLB,, | Performed by: INTERNAL MEDICINE

## 2019-10-03 PROCEDURE — 3045F PR MOST RECENT HEMOGLOBIN A1C LEVEL 7.0-9.0%: ICD-10-PCS | Mod: CPTII,S$GLB,, | Performed by: INTERNAL MEDICINE

## 2019-10-03 PROCEDURE — 99215 PR OFFICE/OUTPT VISIT, EST, LEVL V, 40-54 MIN: ICD-10-PCS | Mod: 25,S$GLB,, | Performed by: INTERNAL MEDICINE

## 2019-10-03 PROCEDURE — 80061 LIPID PANEL: CPT

## 2019-10-03 PROCEDURE — 36415 COLL VENOUS BLD VENIPUNCTURE: CPT

## 2019-10-03 PROCEDURE — 90732 PPSV23 VACC 2 YRS+ SUBQ/IM: CPT | Mod: S$GLB,,, | Performed by: INTERNAL MEDICINE

## 2019-10-03 PROCEDURE — 3077F SYST BP >= 140 MM HG: CPT | Mod: CPTII,S$GLB,, | Performed by: INTERNAL MEDICINE

## 2019-10-03 PROCEDURE — 83036 HEMOGLOBIN GLYCOSYLATED A1C: CPT

## 2019-10-03 PROCEDURE — 80053 COMPREHEN METABOLIC PANEL: CPT

## 2019-10-03 PROCEDURE — 90662 FLU VACCINE - HIGH DOSE (65+) PRESERVATIVE FREE IM: ICD-10-PCS | Mod: S$GLB,,, | Performed by: INTERNAL MEDICINE

## 2019-10-03 PROCEDURE — G0009 PNEUMOCOCCAL POLYSACCHARIDE VACCINE 23-VALENT =>2YO SQ IM: ICD-10-PCS | Mod: S$GLB,,, | Performed by: INTERNAL MEDICINE

## 2019-10-03 PROCEDURE — 3079F DIAST BP 80-89 MM HG: CPT | Mod: CPTII,S$GLB,, | Performed by: INTERNAL MEDICINE

## 2019-10-03 PROCEDURE — 90732 PNEUMOCOCCAL POLYSACCHARIDE VACCINE 23-VALENT =>2YO SQ IM: ICD-10-PCS | Mod: S$GLB,,, | Performed by: INTERNAL MEDICINE

## 2019-10-03 PROCEDURE — G0008 FLU VACCINE - HIGH DOSE (65+) PRESERVATIVE FREE IM: ICD-10-PCS | Mod: S$GLB,,, | Performed by: INTERNAL MEDICINE

## 2019-10-03 PROCEDURE — 90662 IIV NO PRSV INCREASED AG IM: CPT | Mod: S$GLB,,, | Performed by: INTERNAL MEDICINE

## 2019-10-03 PROCEDURE — 82607 VITAMIN B-12: CPT

## 2019-10-03 PROCEDURE — 99215 OFFICE O/P EST HI 40 MIN: CPT | Mod: 25,S$GLB,, | Performed by: INTERNAL MEDICINE

## 2019-10-03 RX ORDER — METFORMIN HYDROCHLORIDE 1000 MG/1
1000 TABLET ORAL 2 TIMES DAILY WITH MEALS
Qty: 180 TABLET | Refills: 3 | Status: SHIPPED | OUTPATIENT
Start: 2019-10-03 | End: 2020-01-22 | Stop reason: SDUPTHER

## 2019-10-03 RX ORDER — CLOPIDOGREL BISULFATE 75 MG/1
75 TABLET ORAL DAILY
Qty: 90 TABLET | Refills: 3 | Status: SHIPPED | OUTPATIENT
Start: 2019-10-03 | End: 2020-01-13

## 2019-10-03 RX ORDER — LOSARTAN POTASSIUM 50 MG/1
50 TABLET ORAL DAILY
Qty: 90 TABLET | Refills: 3 | Status: SHIPPED | OUTPATIENT
Start: 2019-10-03 | End: 2020-01-13

## 2019-10-03 RX ORDER — SERTRALINE HYDROCHLORIDE 100 MG/1
100 TABLET, FILM COATED ORAL DAILY
Qty: 90 TABLET | Refills: 3 | Status: SHIPPED | OUTPATIENT
Start: 2019-10-03 | End: 2020-01-13 | Stop reason: SDUPTHER

## 2019-10-03 RX ORDER — INSULIN ASPART 100 [IU]/ML
10 INJECTION, SOLUTION INTRAVENOUS; SUBCUTANEOUS
Qty: 1 BOX | Refills: 5 | Status: SHIPPED | OUTPATIENT
Start: 2019-10-03 | End: 2020-01-13

## 2019-10-03 RX ORDER — METOPROLOL SUCCINATE 25 MG/1
50 TABLET, EXTENDED RELEASE ORAL DAILY
Qty: 30 TABLET | Refills: 5 | Status: SHIPPED | OUTPATIENT
Start: 2019-10-03 | End: 2020-01-13

## 2019-10-03 NOTE — PROGRESS NOTES
Subjective:       Patient ID: Vicente Luciano is a 73 y.o. male.    Chief Complaint: Follow-up    He is here with his wife for follow-up.  His wife is a retired RN and helps manage his medications.  He admits that he has been out of insulin.  He is not aware that he is supposed to be taking blood pressure medications.    Follow-up   This is a chronic problem. The current episode started more than 1 year ago.   Hypertension   This is a chronic problem. The current episode started more than 1 year ago. The problem is uncontrolled.   Diabetes   He presents for his follow-up diabetic visit. He has type 2 diabetes mellitus. His disease course has been stable.     Review of Systems   Constitutional: Negative.    Respiratory: Negative.    Cardiovascular: Negative.        Objective:      Physical Exam   Constitutional: He appears well-developed and well-nourished.   HENT:   Head: Normocephalic and atraumatic.   Eyes: Pupils are equal, round, and reactive to light.   Cardiovascular: Normal rate, regular rhythm and normal heart sounds.   Pulmonary/Chest: Effort normal.   Musculoskeletal: He exhibits no edema.   Neurological: He is alert.       Assessment:       1. Type 2 diabetes mellitus with complication, without long-term current use of insulin    2. Essential hypertension    3. MS (multiple sclerosis)    4. Colon cancer screening    5. Coronary artery disease involving native coronary artery of native heart with other form of angina pectoris    6. High cholesterol        Plan:       Per orders and D/C instructions.     we will check labs today.  I have given him new prescriptions for his maintenance medications for CAD, diabetes, hypertension, and high cholesterol.  [facility]  I think he will benefit from a continuous glucose monitor, which we will try to obtain for him.  I reminded him he needs to be consistent with his medications.

## 2019-10-03 NOTE — PROGRESS NOTES
Subjective:       Patient ID: Vicente Luciano is a 73 y.o. male.    Chief Complaint: Follow-up    HPI  Review of Systems    Objective:      Physical Exam    Assessment:       1. Type 2 diabetes mellitus with complication, without long-term current use of insulin    2. Essential hypertension    3. MS (multiple sclerosis)    4. Colon cancer screening    5. Coronary artery disease involving native coronary artery of native heart with other form of angina pectoris    6. High cholesterol        Plan:       ***

## 2019-10-04 LAB
CHOLEST SERPL-MCNC: 183 MG/DL (ref 120–199)
CHOLEST/HDLC SERPL: 4.2 {RATIO} (ref 2–5)
ESTIMATED AVG GLUCOSE: 194 MG/DL (ref 68–131)
HBA1C MFR BLD HPLC: 8.4 % (ref 4–5.6)
HDLC SERPL-MCNC: 44 MG/DL (ref 40–75)
HDLC SERPL: 24 % (ref 20–50)
LDLC SERPL CALC-MCNC: 111.4 MG/DL (ref 63–159)
NONHDLC SERPL-MCNC: 139 MG/DL
TRIGL SERPL-MCNC: 138 MG/DL (ref 30–150)
VIT B12 SERPL-MCNC: 431 PG/ML (ref 210–950)

## 2019-11-14 ENCOUNTER — HOSPITAL ENCOUNTER (EMERGENCY)
Facility: HOSPITAL | Age: 74
Discharge: HOME | End: 2019-11-14
Attending: EMERGENCY MEDICINE
Payer: MEDICARE

## 2019-11-14 VITALS
WEIGHT: 200 LBS | BODY MASS INDEX: 26.39 KG/M2 | TEMPERATURE: 98 F | HEART RATE: 77 BPM | DIASTOLIC BLOOD PRESSURE: 85 MMHG | SYSTOLIC BLOOD PRESSURE: 155 MMHG | OXYGEN SATURATION: 99 % | RESPIRATION RATE: 18 BRPM

## 2019-11-14 DIAGNOSIS — R07.9 CHEST PAIN, UNSPECIFIED TYPE: Primary | ICD-10-CM

## 2019-11-14 DIAGNOSIS — R07.9 CHEST PAIN: ICD-10-CM

## 2019-11-14 LAB
ALBUMIN SERPL BCP-MCNC: 4 G/DL (ref 3.5–5.2)
ALP SERPL-CCNC: 88 U/L (ref 55–135)
ALT SERPL W/O P-5'-P-CCNC: 11 U/L (ref 10–44)
ANION GAP SERPL CALC-SCNC: 9 MMOL/L (ref 8–16)
AST SERPL-CCNC: 12 U/L (ref 10–40)
BASOPHILS # BLD AUTO: 0.05 K/UL (ref 0–0.2)
BASOPHILS NFR BLD: 0.7 % (ref 0–1.9)
BILIRUB SERPL-MCNC: 0.5 MG/DL (ref 0.1–1)
BNP SERPL-MCNC: <10 PG/ML (ref 0–99)
BUN SERPL-MCNC: 17 MG/DL (ref 8–23)
CALCIUM SERPL-MCNC: 9 MG/DL (ref 8.7–10.5)
CHLORIDE SERPL-SCNC: 105 MMOL/L (ref 95–110)
CO2 SERPL-SCNC: 23 MMOL/L (ref 23–29)
CREAT SERPL-MCNC: 1.3 MG/DL (ref 0.5–1.4)
DIFFERENTIAL METHOD: NORMAL
EOSINOPHIL # BLD AUTO: 0.1 K/UL (ref 0–0.5)
EOSINOPHIL NFR BLD: 1 % (ref 0–8)
ERYTHROCYTE [DISTWIDTH] IN BLOOD BY AUTOMATED COUNT: 12.8 % (ref 11.5–14.5)
EST. GFR  (AFRICAN AMERICAN): >60 ML/MIN/1.73 M^2
EST. GFR  (NON AFRICAN AMERICAN): 54.9 ML/MIN/1.73 M^2
GLUCOSE SERPL-MCNC: 293 MG/DL (ref 70–110)
HCT VFR BLD AUTO: 43.5 % (ref 40–54)
HGB BLD-MCNC: 15 G/DL (ref 14–18)
IMM GRANULOCYTES # BLD AUTO: 0.02 K/UL (ref 0–0.04)
IMM GRANULOCYTES NFR BLD AUTO: 0.3 % (ref 0–0.5)
LYMPHOCYTES # BLD AUTO: 2.4 K/UL (ref 1–4.8)
LYMPHOCYTES NFR BLD: 32.9 % (ref 18–48)
MCH RBC QN AUTO: 30 PG (ref 27–31)
MCHC RBC AUTO-ENTMCNC: 34.5 G/DL (ref 32–36)
MCV RBC AUTO: 87 FL (ref 82–98)
MONOCYTES # BLD AUTO: 0.5 K/UL (ref 0.3–1)
MONOCYTES NFR BLD: 6.8 % (ref 4–15)
NEUTROPHILS # BLD AUTO: 4.2 K/UL (ref 1.8–7.7)
NEUTROPHILS NFR BLD: 58.3 % (ref 38–73)
NRBC BLD-RTO: 0 /100 WBC
PLATELET # BLD AUTO: 192 K/UL (ref 150–350)
PMV BLD AUTO: 10.1 FL (ref 9.2–12.9)
POCT GLUCOSE: 281 MG/DL (ref 70–110)
POTASSIUM SERPL-SCNC: 4.2 MMOL/L (ref 3.5–5.1)
PROT SERPL-MCNC: 6.9 G/DL (ref 6–8.4)
RBC # BLD AUTO: 5 M/UL (ref 4.6–6.2)
SODIUM SERPL-SCNC: 137 MMOL/L (ref 136–145)
TROPONIN I SERPL DL<=0.01 NG/ML-MCNC: 0.02 NG/ML (ref 0–0.03)
TROPONIN I SERPL DL<=0.01 NG/ML-MCNC: <0.006 NG/ML (ref 0–0.03)
WBC # BLD AUTO: 7.18 K/UL (ref 3.9–12.7)

## 2019-11-14 PROCEDURE — 99285 EMERGENCY DEPT VISIT HI MDM: CPT | Mod: 25

## 2019-11-14 PROCEDURE — 93010 ELECTROCARDIOGRAM REPORT: CPT | Mod: ,,, | Performed by: INTERNAL MEDICINE

## 2019-11-14 PROCEDURE — 63600175 PHARM REV CODE 636 W HCPCS: Performed by: EMERGENCY MEDICINE

## 2019-11-14 PROCEDURE — 99285 PR EMERGENCY DEPT VISIT,LEVEL V: ICD-10-PCS | Mod: ,,, | Performed by: EMERGENCY MEDICINE

## 2019-11-14 PROCEDURE — 80053 COMPREHEN METABOLIC PANEL: CPT

## 2019-11-14 PROCEDURE — 96374 THER/PROPH/DIAG INJ IV PUSH: CPT

## 2019-11-14 PROCEDURE — 93010 EKG 12-LEAD: ICD-10-PCS | Mod: ,,, | Performed by: INTERNAL MEDICINE

## 2019-11-14 PROCEDURE — 85025 COMPLETE CBC W/AUTO DIFF WBC: CPT

## 2019-11-14 PROCEDURE — 82962 GLUCOSE BLOOD TEST: CPT

## 2019-11-14 PROCEDURE — 99285 EMERGENCY DEPT VISIT HI MDM: CPT | Mod: ,,, | Performed by: EMERGENCY MEDICINE

## 2019-11-14 PROCEDURE — 84484 ASSAY OF TROPONIN QUANT: CPT

## 2019-11-14 PROCEDURE — 83880 ASSAY OF NATRIURETIC PEPTIDE: CPT

## 2019-11-14 PROCEDURE — 93005 ELECTROCARDIOGRAM TRACING: CPT

## 2019-11-14 RX ORDER — KETOROLAC TROMETHAMINE 30 MG/ML
15 INJECTION, SOLUTION INTRAMUSCULAR; INTRAVENOUS
Status: COMPLETED | OUTPATIENT
Start: 2019-11-14 | End: 2019-11-14

## 2019-11-14 RX ORDER — ASPIRIN 325 MG
325 TABLET ORAL
Status: DISCONTINUED | OUTPATIENT
Start: 2019-11-14 | End: 2019-11-14

## 2019-11-14 RX ADMIN — KETOROLAC TROMETHAMINE 15 MG: 30 INJECTION, SOLUTION INTRAMUSCULAR; INTRAVENOUS at 03:11

## 2020-01-13 ENCOUNTER — OFFICE VISIT (OUTPATIENT)
Dept: INTERNAL MEDICINE | Facility: CLINIC | Age: 75
End: 2020-01-13
Attending: INTERNAL MEDICINE
Payer: MEDICARE

## 2020-01-13 VITALS
WEIGHT: 208 LBS | OXYGEN SATURATION: 98 % | HEIGHT: 73 IN | DIASTOLIC BLOOD PRESSURE: 70 MMHG | HEART RATE: 74 BPM | BODY MASS INDEX: 27.57 KG/M2 | SYSTOLIC BLOOD PRESSURE: 112 MMHG

## 2020-01-13 DIAGNOSIS — G35 MS (MULTIPLE SCLEROSIS): ICD-10-CM

## 2020-01-13 DIAGNOSIS — E03.9 HYPOTHYROIDISM, UNSPECIFIED TYPE: ICD-10-CM

## 2020-01-13 DIAGNOSIS — D50.8 OTHER IRON DEFICIENCY ANEMIA: ICD-10-CM

## 2020-01-13 DIAGNOSIS — R07.9 CHEST PAIN, UNSPECIFIED TYPE: ICD-10-CM

## 2020-01-13 DIAGNOSIS — E11.69 TYPE 2 DIABETES MELLITUS WITH OTHER SPECIFIED COMPLICATION, WITHOUT LONG-TERM CURRENT USE OF INSULIN: Primary | Chronic | ICD-10-CM

## 2020-01-13 DIAGNOSIS — I10 ESSENTIAL HYPERTENSION: ICD-10-CM

## 2020-01-13 DIAGNOSIS — D51.0 PERNICIOUS ANEMIA: ICD-10-CM

## 2020-01-13 DIAGNOSIS — Z79.4 TYPE 2 DIABETES MELLITUS WITH HYPERGLYCEMIA, WITH LONG-TERM CURRENT USE OF INSULIN: Primary | ICD-10-CM

## 2020-01-13 DIAGNOSIS — E11.65 TYPE 2 DIABETES MELLITUS WITH HYPERGLYCEMIA, WITH LONG-TERM CURRENT USE OF INSULIN: Primary | ICD-10-CM

## 2020-01-13 DIAGNOSIS — J44.89 COPD (CHRONIC OBSTRUCTIVE PULMONARY DISEASE) WITH CHRONIC BRONCHITIS: ICD-10-CM

## 2020-01-13 DIAGNOSIS — E55.9 VITAMIN D DEFICIENCY: ICD-10-CM

## 2020-01-13 DIAGNOSIS — R79.89 OTHER SPECIFIED ABNORMAL FINDINGS OF BLOOD CHEMISTRY: ICD-10-CM

## 2020-01-13 DIAGNOSIS — E78.9 DISORDER OF LIPID METABOLISM: ICD-10-CM

## 2020-01-13 DIAGNOSIS — Z13.39 SCREENING FOR ALCOHOLISM: ICD-10-CM

## 2020-01-13 DIAGNOSIS — E78.00 HIGH CHOLESTEROL: ICD-10-CM

## 2020-01-13 DIAGNOSIS — Z00.00 ROUTINE ADULT HEALTH MAINTENANCE: ICD-10-CM

## 2020-01-13 DIAGNOSIS — Z12.5 SCREENING FOR PROSTATE CANCER: ICD-10-CM

## 2020-01-13 DIAGNOSIS — I25.118 CORONARY ARTERY DISEASE INVOLVING NATIVE CORONARY ARTERY OF NATIVE HEART WITH OTHER FORM OF ANGINA PECTORIS: ICD-10-CM

## 2020-01-13 DIAGNOSIS — Z13.31 SCREENING FOR DEPRESSION: ICD-10-CM

## 2020-01-13 DIAGNOSIS — Z91.148 NON COMPLIANCE W MEDICATION REGIMEN: ICD-10-CM

## 2020-01-13 PROCEDURE — 1159F PR MEDICATION LIST DOCUMENTED IN MEDICAL RECORD: ICD-10-PCS | Mod: S$GLB,,, | Performed by: INTERNAL MEDICINE

## 2020-01-13 PROCEDURE — 3078F DIAST BP <80 MM HG: CPT | Mod: CPTII,S$GLB,, | Performed by: INTERNAL MEDICINE

## 2020-01-13 PROCEDURE — G0444 PR DEPRESSION SCREENING: ICD-10-PCS | Mod: 59,S$GLB,, | Performed by: INTERNAL MEDICINE

## 2020-01-13 PROCEDURE — 1159F MED LIST DOCD IN RCRD: CPT | Mod: S$GLB,,, | Performed by: INTERNAL MEDICINE

## 2020-01-13 PROCEDURE — 1160F PR REVIEW ALL MEDS BY PRESCRIBER/CLIN PHARMACIST DOCUMENTED: ICD-10-PCS | Mod: S$GLB,,, | Performed by: INTERNAL MEDICINE

## 2020-01-13 PROCEDURE — 3074F PR MOST RECENT SYSTOLIC BLOOD PRESSURE < 130 MM HG: ICD-10-PCS | Mod: CPTII,S$GLB,, | Performed by: INTERNAL MEDICINE

## 2020-01-13 PROCEDURE — 3078F PR MOST RECENT DIASTOLIC BLOOD PRESSURE < 80 MM HG: ICD-10-PCS | Mod: CPTII,S$GLB,, | Performed by: INTERNAL MEDICINE

## 2020-01-13 PROCEDURE — 99214 PR OFFICE/OUTPT VISIT, EST, LEVL IV, 30-39 MIN: ICD-10-PCS | Mod: 25,S$GLB,, | Performed by: INTERNAL MEDICINE

## 2020-01-13 PROCEDURE — 1160F RVW MEDS BY RX/DR IN RCRD: CPT | Mod: S$GLB,,, | Performed by: INTERNAL MEDICINE

## 2020-01-13 PROCEDURE — 3074F SYST BP LT 130 MM HG: CPT | Mod: CPTII,S$GLB,, | Performed by: INTERNAL MEDICINE

## 2020-01-13 PROCEDURE — G0444 DEPRESSION SCREEN ANNUAL: HCPCS | Mod: 59,S$GLB,, | Performed by: INTERNAL MEDICINE

## 2020-01-13 PROCEDURE — 99214 OFFICE O/P EST MOD 30 MIN: CPT | Mod: 25,S$GLB,, | Performed by: INTERNAL MEDICINE

## 2020-01-13 RX ORDER — SERTRALINE HYDROCHLORIDE 100 MG/1
100 TABLET, FILM COATED ORAL DAILY
Qty: 90 TABLET | Refills: 3 | Status: SHIPPED | OUTPATIENT
Start: 2020-01-13 | End: 2020-04-13

## 2020-01-13 RX ORDER — NAPROXEN SODIUM 220 MG/1
81 TABLET, FILM COATED ORAL DAILY
COMMUNITY

## 2020-01-13 RX ORDER — SIMVASTATIN 80 MG/1
80 TABLET, FILM COATED ORAL NIGHTLY
Qty: 90 TABLET | Refills: 3 | Status: SHIPPED | OUTPATIENT
Start: 2020-01-13 | End: 2020-04-14

## 2020-01-13 RX ORDER — FLUTICASONE PROPIONATE AND SALMETEROL 250; 50 UG/1; UG/1
1 POWDER RESPIRATORY (INHALATION) 2 TIMES DAILY
Qty: 60 EACH | Refills: 5 | Status: SHIPPED | OUTPATIENT
Start: 2020-01-13 | End: 2021-01-12

## 2020-01-13 RX ORDER — FLUTICASONE PROPIONATE 50 MCG
2 SPRAY, SUSPENSION (ML) NASAL DAILY
Qty: 16 G | Refills: 5 | Status: SHIPPED | OUTPATIENT
Start: 2020-01-13 | End: 2020-04-13

## 2020-01-13 NOTE — PROGRESS NOTES
Subjective:       Patient ID: Vicente Luciano is a 74 y.o. male.    Chief Complaint: Follow-up and Chest Pain (left side,1 + month)    He states that he does not take any medications, not even aspirin.  His wife occasionally gives him some metformin.    He occasionally gets a pressure pain in his chest.  In is not associated with activity or meals.    Follow-up   This is a chronic problem. The current episode started more than 1 year ago. Associated symptoms include chest pain.   Chest Pain    This is a new problem. The current episode started more than 1 month ago. The problem occurs every several days. The problem has been unchanged.   Diabetes   He presents for his follow-up diabetic visit. He has type 2 diabetes mellitus. Associated symptoms include chest pain.     Review of Systems   Constitutional: Negative.    Respiratory: Negative.    Cardiovascular: Positive for chest pain.   Psychiatric/Behavioral: Negative for dysphoric mood.       Objective:      Physical Exam   Constitutional: He appears well-developed and well-nourished.   HENT:   Head: Normocephalic and atraumatic.   Eyes: Pupils are equal, round, and reactive to light.   Cardiovascular: Normal rate, regular rhythm and normal heart sounds.   Pulmonary/Chest: Effort normal.   Musculoskeletal: He exhibits no edema.   Neurological: He is alert.       Assessment:       1. Type 2 diabetes mellitus with other specified complication, without long-term current use of insulin    2. COPD (chronic obstructive pulmonary disease) with chronic bronchitis    3. Essential hypertension    4. MS (multiple sclerosis)    5. High cholesterol    6. Chest pain, unspecified type    7. Coronary artery disease involving native coronary artery of native heart with other form of angina pectoris    8. Non compliance w medication regimen    9. Routine adult health maintenance    10. Screening for alcoholism    11. Screening for depression        Plan:       Per orders and D/C  instructions.  His DM, HTN, and high cholesterol are stable.     refer to Cardiology for CAD and to evaluate his chest pain.  [facility]  He and his wife are both chronically noncompliant with their medications.  I have given him a new prescription for simvastatin and told him to start a baby aspirin.  Check labs today.    Screening: The patient was screened for depression with the PHQ2 questionnaire and possible health consequences were discussed with the patient, who understands (15 minutes spent). The patient was screened for the misuse of alcohol, by asking the number of drinks per average week, and if pt has had more than 4 drinks (more than 3 for women and elderly) in 1 day within the past year. The health and legal consequences of misuse were discussed (15 minutes spent). The patient was screened for obesity (BMI>30), If the current BMI > 30, then the possible consequences of obesity, as well as the benefits of diet, exercise, and weight loss were discussed. Any behavioral risks were identified, and methods to achieve appropriate treatment goals were discussed (15 minutes spent).

## 2020-01-22 DIAGNOSIS — E11.69 TYPE 2 DIABETES MELLITUS WITH OTHER SPECIFIED COMPLICATION, WITHOUT LONG-TERM CURRENT USE OF INSULIN: Primary | Chronic | ICD-10-CM

## 2020-01-22 RX ORDER — METFORMIN HYDROCHLORIDE 1000 MG/1
1000 TABLET ORAL 2 TIMES DAILY WITH MEALS
Qty: 180 TABLET | Refills: 3 | Status: SHIPPED | OUTPATIENT
Start: 2020-01-22

## 2020-02-26 ENCOUNTER — APPOINTMENT (RX ONLY)
Dept: URBAN - NONMETROPOLITAN AREA CLINIC 13 | Facility: CLINIC | Age: 75
Setting detail: DERMATOLOGY
End: 2020-02-26

## 2020-02-26 DIAGNOSIS — L57.8 OTHER SKIN CHANGES DUE TO CHRONIC EXPOSURE TO NONIONIZING RADIATION: ICD-10-CM

## 2020-02-26 DIAGNOSIS — D485 NEOPLASM OF UNCERTAIN BEHAVIOR OF SKIN: ICD-10-CM

## 2020-02-26 DIAGNOSIS — L82.1 OTHER SEBORRHEIC KERATOSIS: ICD-10-CM

## 2020-02-26 DIAGNOSIS — Z80.8 FAMILY HISTORY OF MALIGNANT NEOPLASM OF OTHER ORGANS OR SYSTEMS: ICD-10-CM

## 2020-02-26 DIAGNOSIS — D18.0 HEMANGIOMA: ICD-10-CM

## 2020-02-26 DIAGNOSIS — D22 MELANOCYTIC NEVI: ICD-10-CM

## 2020-02-26 DIAGNOSIS — L57.0 ACTINIC KERATOSIS: ICD-10-CM

## 2020-02-26 PROBLEM — D18.01 HEMANGIOMA OF SKIN AND SUBCUTANEOUS TISSUE: Status: ACTIVE | Noted: 2020-02-26

## 2020-02-26 PROBLEM — D48.5 NEOPLASM OF UNCERTAIN BEHAVIOR OF SKIN: Status: ACTIVE | Noted: 2020-02-26

## 2020-02-26 PROBLEM — D22.61 MELANOCYTIC NEVI OF RIGHT UPPER LIMB, INCLUDING SHOULDER: Status: ACTIVE | Noted: 2020-02-26

## 2020-02-26 PROBLEM — D22.62 MELANOCYTIC NEVI OF LEFT UPPER LIMB, INCLUDING SHOULDER: Status: ACTIVE | Noted: 2020-02-26

## 2020-02-26 PROBLEM — D22.5 MELANOCYTIC NEVI OF TRUNK: Status: ACTIVE | Noted: 2020-02-26

## 2020-02-26 PROCEDURE — ? ADDITIONAL NOTES

## 2020-02-26 PROCEDURE — ? COUNSELING

## 2020-02-26 PROCEDURE — 11102 TANGNTL BX SKIN SINGLE LES: CPT | Mod: 59

## 2020-02-26 PROCEDURE — ? LIQUID NITROGEN

## 2020-02-26 PROCEDURE — 99213 OFFICE O/P EST LOW 20 MIN: CPT | Mod: 25

## 2020-02-26 PROCEDURE — ? BIOPSY BY SHAVE METHOD

## 2020-02-26 PROCEDURE — 17004 DESTROY PREMAL LESIONS 15/>: CPT

## 2020-02-26 ASSESSMENT — LOCATION ZONE DERM
LOCATION ZONE: ARM
LOCATION ZONE: FACE
LOCATION ZONE: HAND
LOCATION ZONE: FINGER
LOCATION ZONE: TRUNK
LOCATION ZONE: NOSE

## 2020-02-26 ASSESSMENT — LOCATION SIMPLE DESCRIPTION DERM
LOCATION SIMPLE: LEFT HAND
LOCATION SIMPLE: LEFT FOREHEAD
LOCATION SIMPLE: LEFT FOREARM
LOCATION SIMPLE: RIGHT HAND
LOCATION SIMPLE: RIGHT UPPER BACK
LOCATION SIMPLE: ABDOMEN
LOCATION SIMPLE: LEFT EYEBROW
LOCATION SIMPLE: RIGHT FOREHEAD
LOCATION SIMPLE: NOSE
LOCATION SIMPLE: RIGHT FOREARM
LOCATION SIMPLE: UPPER BACK
LOCATION SIMPLE: RIGHT CHEEK
LOCATION SIMPLE: LEFT UPPER ARM
LOCATION SIMPLE: RIGHT THUMB
LOCATION SIMPLE: RIGHT WRIST
LOCATION SIMPLE: RIGHT UPPER ARM

## 2020-02-26 ASSESSMENT — LOCATION DETAILED DESCRIPTION DERM
LOCATION DETAILED: 1ST WEB SPACE LEFT HAND
LOCATION DETAILED: RIGHT PROXIMAL POSTERIOR UPPER ARM
LOCATION DETAILED: LEFT PROXIMAL POSTERIOR UPPER ARM
LOCATION DETAILED: LEFT LATERAL EYEBROW
LOCATION DETAILED: RIGHT SUPERIOR FOREHEAD
LOCATION DETAILED: RIGHT PROXIMAL RADIAL DORSAL FOREARM
LOCATION DETAILED: LEFT DISTAL LATERAL POSTERIOR UPPER ARM
LOCATION DETAILED: LEFT VENTRAL PROXIMAL FOREARM
LOCATION DETAILED: LEFT PROXIMAL RADIAL DORSAL FOREARM
LOCATION DETAILED: RIGHT RADIAL DORSAL HAND
LOCATION DETAILED: EPIGASTRIC SKIN
LOCATION DETAILED: LEFT RADIAL DORSAL HAND
LOCATION DETAILED: RIGHT DORSAL WRIST
LOCATION DETAILED: RIGHT VENTRAL PROXIMAL FOREARM
LOCATION DETAILED: LEFT INFERIOR FOREHEAD
LOCATION DETAILED: RIGHT ANTERIOR DISTAL UPPER ARM
LOCATION DETAILED: LEFT DISTAL DORSAL FOREARM
LOCATION DETAILED: RIGHT NASAL DORSUM
LOCATION DETAILED: SUPERIOR THORACIC SPINE
LOCATION DETAILED: LEFT DISTAL RADIAL DORSAL FOREARM
LOCATION DETAILED: LEFT DISTAL POSTERIOR UPPER ARM
LOCATION DETAILED: 2ND WEB SPACE LEFT HAND
LOCATION DETAILED: RIGHT ULNAR DORSAL HAND
LOCATION DETAILED: RIGHT FOREHEAD
LOCATION DETAILED: 1ST WEB SPACE RIGHT HAND
LOCATION DETAILED: INFERIOR THORACIC SPINE
LOCATION DETAILED: RIGHT SUPERIOR UPPER BACK
LOCATION DETAILED: RIGHT INFERIOR PREAURICULAR CHEEK
LOCATION DETAILED: RIGHT DISTAL RADIAL DORSAL FOREARM
LOCATION DETAILED: RIGHT DISTAL DORSAL FOREARM
LOCATION DETAILED: LEFT PROXIMAL DORSAL FOREARM
LOCATION DETAILED: LEFT ANTERIOR PROXIMAL UPPER ARM
LOCATION DETAILED: LEFT ULNAR DORSAL HAND
LOCATION DETAILED: LEFT FOREHEAD
LOCATION DETAILED: RIGHT DISTAL POSTERIOR UPPER ARM
LOCATION DETAILED: RIGHT INFERIOR LATERAL FOREHEAD

## 2020-02-26 NOTE — PROCEDURE: LIQUID NITROGEN
Consent: The patient's consent was obtained including but not limited to risks of crusting, scabbing, blistering, scarring, darker or lighter pigmentary change, recurrence, incomplete removal and infection.
Duration Of Freeze Thaw-Cycle (Seconds): 1
Render Note In Bullet Format When Appropriate: No
Aperture Size (Optional): C
Post-Care Instructions: I reviewed with the patient in detail post-care instructions. Patient is to wear sunprotection, and avoid picking at any of the treated lesions. Pt may apply Vaseline to crusted or scabbing areas.  They were told if any of the lesions fail to resolve to f/u and have them evaluated.
Detail Level: Detailed
Number Of Freeze-Thaw Cycles: 1 freeze-thaw cycle

## 2020-02-26 NOTE — PROCEDURE: BIOPSY BY SHAVE METHOD
Detail Level: Detailed
Depth Of Biopsy: dermis
Was A Bandage Applied: Yes
Size Of Lesion In Cm: 0.3
X Size Of Lesion In Cm: 0
Biopsy Type: H and E
Biopsy Method: double edge Personna blade
Anesthesia Type: 1% lidocaine with epinephrine
Anesthesia Volume In Cc: 0.5
Hemostasis: Aluminum Chloride
Wound Care: Petrolatum
Dressing: bandage
Destruction After The Procedure: No
Type Of Destruction Used: Curettage
Curettage Text: The wound bed was treated with curettage after the biopsy was performed.
Cryotherapy Text: The wound bed was treated with cryotherapy after the biopsy was performed.
Electrodesiccation Text: The wound bed was treated with electrodesiccation after the biopsy was performed.
Electrodesiccation And Curettage Text: The wound bed was treated with electrodesiccation and curettage after the biopsy was performed.
Silver Nitrate Text: The wound bed was treated with silver nitrate after the biopsy was performed.
Lab: [id number]
Lab Facility: [id number]
Consent: Verbal  consent was obtained and risks were reviewed including but not limited to scarring, infection, bleeding, scabbing, incomplete removal, nerve damage and allergy to anesthesia.
Post-Care Instructions: I reviewed with the patient in detail post-care instructions. Patient is to keep the biopsy site dry overnight, and then apply Vaseline  twice daily until healed. Patient advised to call the office if becomes tender or concerns of infection.
Notification Instructions: Patient will be notified of biopsy results. However, patient instructed to call the office if not contacted within 2 weeks.
Billing Type: Third-Party Bill

## 2020-02-26 NOTE — HPI: EVALUATION OF SKIN LESION(S)
How Severe Are Your Spot(S)?: mild
Have Your Spot(S) Been Treated In The Past?: has not been treated
Hpi Title: Evaluation of Skin Lesions
Additional History: Full body skin exam
Family Member: Father

## 2020-02-26 NOTE — PROCEDURE: MIPS QUALITY
Detail Level: Detailed
Quality 226: Preventive Care And Screening: Tobacco Use: Screening And Cessation Intervention: Patient screened for tobacco and never smoked
Quality 226: Preventive Care And Screening: Tobacco Use: Screening And Cessation Intervention: Patient screened for tobacco use and is an ex/non-smoker
Quality 110: Preventive Care And Screening: Influenza Immunization: Influenza Immunization Administered during Influenza season
Quality 111:Pneumonia Vaccination Status For Older Adults: Pneumococcal Vaccination Previously Received

## 2020-04-13 ENCOUNTER — LAB VISIT (OUTPATIENT)
Dept: LAB | Facility: OTHER | Age: 75
End: 2020-04-13
Attending: INTERNAL MEDICINE
Payer: MEDICARE

## 2020-04-13 ENCOUNTER — OFFICE VISIT (OUTPATIENT)
Dept: INTERNAL MEDICINE | Facility: CLINIC | Age: 75
End: 2020-04-13
Attending: INTERNAL MEDICINE
Payer: MEDICARE

## 2020-04-13 VITALS
WEIGHT: 202 LBS | SYSTOLIC BLOOD PRESSURE: 138 MMHG | OXYGEN SATURATION: 97 % | DIASTOLIC BLOOD PRESSURE: 78 MMHG | BODY MASS INDEX: 26.77 KG/M2 | HEART RATE: 87 BPM | HEIGHT: 73 IN

## 2020-04-13 DIAGNOSIS — I25.118 CORONARY ARTERY DISEASE INVOLVING NATIVE CORONARY ARTERY OF NATIVE HEART WITH OTHER FORM OF ANGINA PECTORIS: ICD-10-CM

## 2020-04-13 DIAGNOSIS — E11.69 TYPE 2 DIABETES MELLITUS WITH OTHER SPECIFIED COMPLICATION, WITHOUT LONG-TERM CURRENT USE OF INSULIN: Primary | Chronic | ICD-10-CM

## 2020-04-13 DIAGNOSIS — Z91.148 NON COMPLIANCE W MEDICATION REGIMEN: ICD-10-CM

## 2020-04-13 DIAGNOSIS — D50.8 OTHER IRON DEFICIENCY ANEMIA: ICD-10-CM

## 2020-04-13 DIAGNOSIS — E11.65 TYPE 2 DIABETES MELLITUS WITH HYPERGLYCEMIA, WITH LONG-TERM CURRENT USE OF INSULIN: ICD-10-CM

## 2020-04-13 DIAGNOSIS — I10 ESSENTIAL HYPERTENSION: ICD-10-CM

## 2020-04-13 DIAGNOSIS — E78.00 HIGH CHOLESTEROL: ICD-10-CM

## 2020-04-13 DIAGNOSIS — R79.89 OTHER SPECIFIED ABNORMAL FINDINGS OF BLOOD CHEMISTRY: ICD-10-CM

## 2020-04-13 DIAGNOSIS — G35 MS (MULTIPLE SCLEROSIS): ICD-10-CM

## 2020-04-13 DIAGNOSIS — R07.9 CHEST PAIN, UNSPECIFIED TYPE: ICD-10-CM

## 2020-04-13 DIAGNOSIS — Z79.4 TYPE 2 DIABETES MELLITUS WITH HYPERGLYCEMIA, WITH LONG-TERM CURRENT USE OF INSULIN: ICD-10-CM

## 2020-04-13 DIAGNOSIS — E78.9 DISORDER OF LIPID METABOLISM: ICD-10-CM

## 2020-04-13 LAB
ALBUMIN SERPL BCP-MCNC: 4.5 G/DL (ref 3.5–5.2)
ALP SERPL-CCNC: 90 U/L (ref 55–135)
ALT SERPL W/O P-5'-P-CCNC: 21 U/L (ref 10–44)
ANION GAP SERPL CALC-SCNC: 10 MMOL/L (ref 8–16)
AST SERPL-CCNC: 19 U/L (ref 10–40)
BASOPHILS # BLD AUTO: 0.04 K/UL (ref 0–0.2)
BASOPHILS NFR BLD: 0.7 % (ref 0–1.9)
BILIRUB SERPL-MCNC: 0.3 MG/DL (ref 0.1–1)
BUN SERPL-MCNC: 12 MG/DL (ref 8–23)
C PEPTIDE SERPL-MCNC: 2.27 NG/ML (ref 0.78–5.19)
CALCIUM SERPL-MCNC: 9.5 MG/DL (ref 8.7–10.5)
CHLORIDE SERPL-SCNC: 104 MMOL/L (ref 95–110)
CHOLEST SERPL-MCNC: 184 MG/DL (ref 120–199)
CHOLEST/HDLC SERPL: 4.6 {RATIO} (ref 2–5)
CO2 SERPL-SCNC: 26 MMOL/L (ref 23–29)
CREAT SERPL-MCNC: 1.3 MG/DL (ref 0.5–1.4)
DIFFERENTIAL METHOD: NORMAL
EOSINOPHIL # BLD AUTO: 0.1 K/UL (ref 0–0.5)
EOSINOPHIL NFR BLD: 2.1 % (ref 0–8)
ERYTHROCYTE [DISTWIDTH] IN BLOOD BY AUTOMATED COUNT: 12.6 % (ref 11.5–14.5)
EST. GFR  (AFRICAN AMERICAN): >60 ML/MIN/1.73 M^2
EST. GFR  (NON AFRICAN AMERICAN): 55 ML/MIN/1.73 M^2
ESTIMATED AVG GLUCOSE: 169 MG/DL (ref 68–131)
GLUCOSE SERPL-MCNC: 160 MG/DL (ref 70–110)
HBA1C MFR BLD HPLC: 7.5 % (ref 4–5.6)
HCT VFR BLD AUTO: 47 % (ref 40–54)
HDLC SERPL-MCNC: 40 MG/DL (ref 40–75)
HDLC SERPL: 21.7 % (ref 20–50)
HGB BLD-MCNC: 15.7 G/DL (ref 14–18)
IMM GRANULOCYTES # BLD AUTO: 0.02 K/UL (ref 0–0.04)
IMM GRANULOCYTES NFR BLD AUTO: 0.4 % (ref 0–0.5)
LDLC SERPL CALC-MCNC: 93.2 MG/DL (ref 63–159)
LYMPHOCYTES # BLD AUTO: 1.9 K/UL (ref 1–4.8)
LYMPHOCYTES NFR BLD: 34.5 % (ref 18–48)
MCH RBC QN AUTO: 29.2 PG (ref 27–31)
MCHC RBC AUTO-ENTMCNC: 33.4 G/DL (ref 32–36)
MCV RBC AUTO: 87 FL (ref 82–98)
MONOCYTES # BLD AUTO: 0.4 K/UL (ref 0.3–1)
MONOCYTES NFR BLD: 6.8 % (ref 4–15)
NEUTROPHILS # BLD AUTO: 3.1 K/UL (ref 1.8–7.7)
NEUTROPHILS NFR BLD: 55.5 % (ref 38–73)
NONHDLC SERPL-MCNC: 144 MG/DL
NRBC BLD-RTO: 0 /100 WBC
PLATELET # BLD AUTO: 185 K/UL (ref 150–350)
PMV BLD AUTO: 10.6 FL (ref 9.2–12.9)
POTASSIUM SERPL-SCNC: 4.5 MMOL/L (ref 3.5–5.1)
PROT SERPL-MCNC: 7.6 G/DL (ref 6–8.4)
RBC # BLD AUTO: 5.38 M/UL (ref 4.6–6.2)
SODIUM SERPL-SCNC: 140 MMOL/L (ref 136–145)
TRIGL SERPL-MCNC: 254 MG/DL (ref 30–150)
WBC # BLD AUTO: 5.6 K/UL (ref 3.9–12.7)

## 2020-04-13 PROCEDURE — 3075F SYST BP GE 130 - 139MM HG: CPT | Mod: CPTII,S$GLB,, | Performed by: INTERNAL MEDICINE

## 2020-04-13 PROCEDURE — 99214 OFFICE O/P EST MOD 30 MIN: CPT | Mod: S$GLB,,, | Performed by: INTERNAL MEDICINE

## 2020-04-13 PROCEDURE — 36415 COLL VENOUS BLD VENIPUNCTURE: CPT

## 2020-04-13 PROCEDURE — 3078F DIAST BP <80 MM HG: CPT | Mod: CPTII,S$GLB,, | Performed by: INTERNAL MEDICINE

## 2020-04-13 PROCEDURE — 80061 LIPID PANEL: CPT

## 2020-04-13 PROCEDURE — 3075F PR MOST RECENT SYSTOLIC BLOOD PRESS GE 130-139MM HG: ICD-10-PCS | Mod: CPTII,S$GLB,, | Performed by: INTERNAL MEDICINE

## 2020-04-13 PROCEDURE — 99214 PR OFFICE/OUTPT VISIT, EST, LEVL IV, 30-39 MIN: ICD-10-PCS | Mod: S$GLB,,, | Performed by: INTERNAL MEDICINE

## 2020-04-13 PROCEDURE — 3052F HG A1C>EQUAL 8.0%<EQUAL 9.0%: CPT | Mod: CPTII,S$GLB,, | Performed by: INTERNAL MEDICINE

## 2020-04-13 PROCEDURE — 83036 HEMOGLOBIN GLYCOSYLATED A1C: CPT

## 2020-04-13 PROCEDURE — 85025 COMPLETE CBC W/AUTO DIFF WBC: CPT

## 2020-04-13 PROCEDURE — 3052F PR MOST RECENT HEMOGLOBIN A1C LEVEL 8.0 - < 9.0%: ICD-10-PCS | Mod: CPTII,S$GLB,, | Performed by: INTERNAL MEDICINE

## 2020-04-13 PROCEDURE — 84681 ASSAY OF C-PEPTIDE: CPT

## 2020-04-13 PROCEDURE — 1159F PR MEDICATION LIST DOCUMENTED IN MEDICAL RECORD: ICD-10-PCS | Mod: S$GLB,,, | Performed by: INTERNAL MEDICINE

## 2020-04-13 PROCEDURE — 80053 COMPREHEN METABOLIC PANEL: CPT

## 2020-04-13 PROCEDURE — 1159F MED LIST DOCD IN RCRD: CPT | Mod: S$GLB,,, | Performed by: INTERNAL MEDICINE

## 2020-04-13 PROCEDURE — 3078F PR MOST RECENT DIASTOLIC BLOOD PRESSURE < 80 MM HG: ICD-10-PCS | Mod: CPTII,S$GLB,, | Performed by: INTERNAL MEDICINE

## 2020-04-13 NOTE — PROGRESS NOTES
Subjective:       Patient ID: Vicente Luciano is a 74 y.o. male.    Chief Complaint: Follow-up; Cough (2+ days); and Chest Pain (left )    He has some pain in his left lower chest and back.  It is worse when he takes a deep breath.  He does not have shortness of breath.  It is no worse after meals or with activity.  His last hemoglobin A1c was in October 2019 and was 8.4.  He states that he is not taking any medications.    Follow-up   This is a chronic problem. The current episode started more than 1 year ago. Associated symptoms include chest pain.   Chest Pain    The current episode started more than 1 month ago.   Diabetes   He presents for his follow-up diabetic visit. He has type 2 diabetes mellitus. His disease course has been stable. Associated symptoms include chest pain.     Review of Systems   Constitutional: Negative.    Respiratory: Negative.    Cardiovascular: Positive for chest pain.       Objective:      Physical Exam   Constitutional: He appears well-developed and well-nourished.   HENT:   Head: Normocephalic and atraumatic.   Eyes: Pupils are equal, round, and reactive to light.   Cardiovascular: Normal rate, regular rhythm and normal heart sounds.   Pulmonary/Chest: Effort normal.   Musculoskeletal: He exhibits no edema.   Neurological: He is alert.       Assessment:       1. Type 2 diabetes mellitus with other specified complication, without long-term current use of insulin    2. Essential hypertension    3. MS (multiple sclerosis)    4. Coronary artery disease involving native coronary artery of native heart with other form of angina pectoris    5. High cholesterol    6. Chest pain, unspecified type        Plan:       Per orders and D/C instructions.     his chest pain seems pleuritic in nature.  He will monitor the the pain and let me know if it does not get better.  I have suggested in the past he see a cardiologist.  He currently is not taking any of his medications.  I have strongly  encouraged him to go to the lab today and get the blood work which was order the last visit.  I will likely Restart aspirin, metformin, and simvastatin.

## 2020-04-14 DIAGNOSIS — E78.00 HIGH CHOLESTEROL: Primary | ICD-10-CM

## 2020-04-14 RX ORDER — SIMVASTATIN 40 MG/1
40 TABLET, FILM COATED ORAL NIGHTLY
Qty: 90 TABLET | Refills: 3 | Status: SHIPPED | OUTPATIENT
Start: 2020-04-14 | End: 2020-04-14 | Stop reason: SDUPTHER

## 2020-04-14 RX ORDER — SIMVASTATIN 40 MG/1
40 TABLET, FILM COATED ORAL NIGHTLY
Qty: 90 TABLET | Refills: 3 | Status: SHIPPED | OUTPATIENT
Start: 2020-04-14 | End: 2021-04-14

## 2020-08-26 ENCOUNTER — APPOINTMENT (RX ONLY)
Dept: URBAN - NONMETROPOLITAN AREA CLINIC 13 | Facility: CLINIC | Age: 75
Setting detail: DERMATOLOGY
End: 2020-08-26

## 2020-08-26 DIAGNOSIS — D18.0 HEMANGIOMA: ICD-10-CM

## 2020-08-26 DIAGNOSIS — L82.1 OTHER SEBORRHEIC KERATOSIS: ICD-10-CM

## 2020-08-26 DIAGNOSIS — D22 MELANOCYTIC NEVI: ICD-10-CM

## 2020-08-26 DIAGNOSIS — L57.8 OTHER SKIN CHANGES DUE TO CHRONIC EXPOSURE TO NONIONIZING RADIATION: ICD-10-CM

## 2020-08-26 DIAGNOSIS — Z80.8 FAMILY HISTORY OF MALIGNANT NEOPLASM OF OTHER ORGANS OR SYSTEMS: ICD-10-CM

## 2020-08-26 DIAGNOSIS — L57.0 ACTINIC KERATOSIS: ICD-10-CM

## 2020-08-26 PROBLEM — D18.01 HEMANGIOMA OF SKIN AND SUBCUTANEOUS TISSUE: Status: ACTIVE | Noted: 2020-08-26

## 2020-08-26 PROBLEM — D22.61 MELANOCYTIC NEVI OF RIGHT UPPER LIMB, INCLUDING SHOULDER: Status: ACTIVE | Noted: 2020-08-26

## 2020-08-26 PROBLEM — D22.5 MELANOCYTIC NEVI OF TRUNK: Status: ACTIVE | Noted: 2020-08-26

## 2020-08-26 PROBLEM — D22.62 MELANOCYTIC NEVI OF LEFT UPPER LIMB, INCLUDING SHOULDER: Status: ACTIVE | Noted: 2020-08-26

## 2020-08-26 PROCEDURE — ? LIQUID NITROGEN

## 2020-08-26 PROCEDURE — ? ADDITIONAL NOTES

## 2020-08-26 PROCEDURE — 99213 OFFICE O/P EST LOW 20 MIN: CPT | Mod: 25

## 2020-08-26 PROCEDURE — ? COUNSELING

## 2020-08-26 PROCEDURE — 17004 DESTROY PREMAL LESIONS 15/>: CPT

## 2020-08-26 ASSESSMENT — LOCATION DETAILED DESCRIPTION DERM
LOCATION DETAILED: LEFT INFERIOR LATERAL MALAR CHEEK
LOCATION DETAILED: RIGHT PROXIMAL DORSAL FOREARM
LOCATION DETAILED: LEFT VENTRAL PROXIMAL FOREARM
LOCATION DETAILED: INFERIOR THORACIC SPINE
LOCATION DETAILED: RIGHT SUPERIOR CENTRAL BUCCAL CHEEK
LOCATION DETAILED: LEFT ULNAR DORSAL HAND
LOCATION DETAILED: LEFT RADIAL DORSAL HAND
LOCATION DETAILED: RIGHT LATERAL MANDIBULAR CHEEK
LOCATION DETAILED: LEFT CENTRAL TEMPLE
LOCATION DETAILED: LEFT LATERAL MALAR CHEEK
LOCATION DETAILED: RIGHT INFERIOR LATERAL MALAR CHEEK
LOCATION DETAILED: RIGHT LATERAL MALAR CHEEK
LOCATION DETAILED: RIGHT CENTRAL ZYGOMA
LOCATION DETAILED: RIGHT DISTAL POSTERIOR UPPER ARM
LOCATION DETAILED: RIGHT RADIAL DORSAL HAND
LOCATION DETAILED: RIGHT DORSAL WRIST
LOCATION DETAILED: RIGHT SUPERIOR UPPER BACK
LOCATION DETAILED: RIGHT ULNAR DORSAL HAND
LOCATION DETAILED: LEFT DORSAL INDEX METACARPOPHALANGEAL JOINT
LOCATION DETAILED: RIGHT SUPERIOR CENTRAL MALAR CHEEK
LOCATION DETAILED: LEFT DISTAL DORSAL FOREARM
LOCATION DETAILED: 3RD WEB SPACE LEFT HAND
LOCATION DETAILED: EPIGASTRIC SKIN
LOCATION DETAILED: RIGHT VENTRAL PROXIMAL FOREARM
LOCATION DETAILED: LEFT DISTAL POSTERIOR UPPER ARM
LOCATION DETAILED: LEFT ANTERIOR PROXIMAL UPPER ARM
LOCATION DETAILED: RIGHT PROXIMAL POSTERIOR UPPER ARM
LOCATION DETAILED: LEFT CENTRAL MALAR CHEEK
LOCATION DETAILED: LEFT PROXIMAL POSTERIOR UPPER ARM
LOCATION DETAILED: SUPERIOR THORACIC SPINE
LOCATION DETAILED: LEFT INFERIOR CENTRAL MALAR CHEEK
LOCATION DETAILED: LEFT CENTRAL ZYGOMA
LOCATION DETAILED: RIGHT DISTAL RADIAL DORSAL FOREARM
LOCATION DETAILED: RIGHT ANTERIOR DISTAL UPPER ARM
LOCATION DETAILED: RIGHT MID PREAURICULAR CHEEK

## 2020-08-26 ASSESSMENT — LOCATION SIMPLE DESCRIPTION DERM
LOCATION SIMPLE: LEFT ZYGOMA
LOCATION SIMPLE: LEFT TEMPLE
LOCATION SIMPLE: UPPER BACK
LOCATION SIMPLE: LEFT CHEEK
LOCATION SIMPLE: RIGHT CHEEK
LOCATION SIMPLE: RIGHT FOREARM
LOCATION SIMPLE: RIGHT HAND
LOCATION SIMPLE: RIGHT WRIST
LOCATION SIMPLE: RIGHT UPPER ARM
LOCATION SIMPLE: LEFT HAND
LOCATION SIMPLE: LEFT UPPER ARM
LOCATION SIMPLE: RIGHT ZYGOMA
LOCATION SIMPLE: ABDOMEN
LOCATION SIMPLE: LEFT FOREARM
LOCATION SIMPLE: RIGHT UPPER BACK

## 2020-08-26 ASSESSMENT — LOCATION ZONE DERM
LOCATION ZONE: FACE
LOCATION ZONE: HAND
LOCATION ZONE: TRUNK
LOCATION ZONE: ARM

## 2020-08-26 NOTE — PROCEDURE: LIQUID NITROGEN
Detail Level: Detailed
Number Of Freeze-Thaw Cycles: 1 freeze-thaw cycle
Post-Care Instructions: I reviewed with the patient in detail post-care instructions. Patient is to wear sunprotection, and avoid picking at any of the treated lesions. Pt may apply Vaseline to crusted or scabbing areas.  They were told if any of the lesions fail to resolve to f/u and have them evaluated.
Aperture Size (Optional): C
Consent: The patient's consent was obtained including but not limited to risks of crusting, scabbing, blistering, scarring, darker or lighter pigmentary change, recurrence, incomplete removal and infection.
Duration Of Freeze Thaw-Cycle (Seconds): 1
Render Note In Bullet Format When Appropriate: No

## 2020-08-31 RX ORDER — MELOXICAM 7.5 MG/1
7.5 TABLET ORAL DAILY PRN
Qty: 30 TABLET | Refills: 0 | Status: SHIPPED | OUTPATIENT
Start: 2020-08-31

## 2020-11-06 DIAGNOSIS — E11.9 TYPE 2 DIABETES MELLITUS WITHOUT COMPLICATION: ICD-10-CM

## 2021-02-24 ENCOUNTER — APPOINTMENT (RX ONLY)
Dept: URBAN - NONMETROPOLITAN AREA CLINIC 4 | Facility: CLINIC | Age: 76
Setting detail: DERMATOLOGY
End: 2021-02-24

## 2021-02-24 DIAGNOSIS — L57.0 ACTINIC KERATOSIS: ICD-10-CM

## 2021-02-24 DIAGNOSIS — L82.1 OTHER SEBORRHEIC KERATOSIS: ICD-10-CM

## 2021-02-24 DIAGNOSIS — D18.0 HEMANGIOMA: ICD-10-CM

## 2021-02-24 DIAGNOSIS — Z85.828 PERSONAL HISTORY OF OTHER MALIGNANT NEOPLASM OF SKIN: ICD-10-CM

## 2021-02-24 DIAGNOSIS — L57.8 OTHER SKIN CHANGES DUE TO CHRONIC EXPOSURE TO NONIONIZING RADIATION: ICD-10-CM

## 2021-02-24 DIAGNOSIS — D22 MELANOCYTIC NEVI: ICD-10-CM

## 2021-02-24 PROBLEM — D22.5 MELANOCYTIC NEVI OF TRUNK: Status: ACTIVE | Noted: 2021-02-24

## 2021-02-24 PROBLEM — D22.62 MELANOCYTIC NEVI OF LEFT UPPER LIMB, INCLUDING SHOULDER: Status: ACTIVE | Noted: 2021-02-24

## 2021-02-24 PROBLEM — D18.01 HEMANGIOMA OF SKIN AND SUBCUTANEOUS TISSUE: Status: ACTIVE | Noted: 2021-02-24

## 2021-02-24 PROBLEM — D22.61 MELANOCYTIC NEVI OF RIGHT UPPER LIMB, INCLUDING SHOULDER: Status: ACTIVE | Noted: 2021-02-24

## 2021-02-24 PROCEDURE — ? COUNSELING

## 2021-02-24 PROCEDURE — ? SUNSCREEN RECOMMENDATIONS

## 2021-02-24 PROCEDURE — 17004 DESTROY PREMAL LESIONS 15/>: CPT

## 2021-02-24 PROCEDURE — 99213 OFFICE O/P EST LOW 20 MIN: CPT | Mod: 25

## 2021-02-24 PROCEDURE — ? LIQUID NITROGEN

## 2021-02-24 ASSESSMENT — LOCATION SIMPLE DESCRIPTION DERM
LOCATION SIMPLE: LEFT HAND
LOCATION SIMPLE: RIGHT HAND
LOCATION SIMPLE: RIGHT UPPER BACK
LOCATION SIMPLE: UPPER BACK
LOCATION SIMPLE: LEFT FOREARM
LOCATION SIMPLE: RIGHT FOREARM
LOCATION SIMPLE: RIGHT UPPER ARM
LOCATION SIMPLE: RIGHT CHEEK
LOCATION SIMPLE: LEFT CHEEK
LOCATION SIMPLE: RIGHT EAR
LOCATION SIMPLE: LEFT UPPER ARM
LOCATION SIMPLE: SUBMENTAL CHIN
LOCATION SIMPLE: RIGHT WRIST
LOCATION SIMPLE: RIGHT ZYGOMA
LOCATION SIMPLE: ABDOMEN

## 2021-02-24 ASSESSMENT — LOCATION DETAILED DESCRIPTION DERM
LOCATION DETAILED: 2ND WEB SPACE RIGHT HAND
LOCATION DETAILED: LEFT DISTAL POSTERIOR UPPER ARM
LOCATION DETAILED: LEFT VENTRAL PROXIMAL FOREARM
LOCATION DETAILED: LEFT RADIAL DORSAL HAND
LOCATION DETAILED: RIGHT CENTRAL ZYGOMA
LOCATION DETAILED: RIGHT PROXIMAL DORSAL FOREARM
LOCATION DETAILED: RIGHT RADIAL DORSAL HAND
LOCATION DETAILED: LEFT DISTAL DORSAL FOREARM
LOCATION DETAILED: RIGHT ULNAR DORSAL HAND
LOCATION DETAILED: LEFT SUPERIOR LATERAL MALAR CHEEK
LOCATION DETAILED: LEFT ULNAR DORSAL HAND
LOCATION DETAILED: SUBMENTAL CHIN
LOCATION DETAILED: RIGHT PROXIMAL POSTERIOR UPPER ARM
LOCATION DETAILED: LEFT SUPERIOR CENTRAL MALAR CHEEK
LOCATION DETAILED: LEFT PROXIMAL DORSAL FOREARM
LOCATION DETAILED: RIGHT DISTAL DORSAL FOREARM
LOCATION DETAILED: RIGHT ANTERIOR DISTAL UPPER ARM
LOCATION DETAILED: INFERIOR THORACIC SPINE
LOCATION DETAILED: RIGHT VENTRAL PROXIMAL FOREARM
LOCATION DETAILED: LEFT DORSAL RING METACARPOPHALANGEAL JOINT
LOCATION DETAILED: LEFT MID PREAURICULAR CHEEK
LOCATION DETAILED: SUPERIOR THORACIC SPINE
LOCATION DETAILED: LEFT ANTERIOR PROXIMAL UPPER ARM
LOCATION DETAILED: RIGHT DORSAL WRIST
LOCATION DETAILED: RIGHT SUPERIOR CENTRAL MALAR CHEEK
LOCATION DETAILED: LEFT PROXIMAL POSTERIOR UPPER ARM
LOCATION DETAILED: RIGHT ANTIHELIX
LOCATION DETAILED: RIGHT DISTAL POSTERIOR UPPER ARM
LOCATION DETAILED: EPIGASTRIC SKIN
LOCATION DETAILED: RIGHT SUPERIOR UPPER BACK

## 2021-02-24 ASSESSMENT — LOCATION ZONE DERM
LOCATION ZONE: TRUNK
LOCATION ZONE: FACE
LOCATION ZONE: ARM
LOCATION ZONE: HAND
LOCATION ZONE: EAR

## 2021-02-24 NOTE — PROCEDURE: LIQUID NITROGEN
Render Note In Bullet Format When Appropriate: No
Aperture Size (Optional): C
Duration Of Freeze Thaw-Cycle (Seconds): 1
Consent: The patient's consent was obtained including but not limited to risks of crusting, scabbing, blistering, scarring, darker or lighter pigmentary change, recurrence, incomplete removal and infection.
Detail Level: Detailed
Number Of Freeze-Thaw Cycles: 1 freeze-thaw cycle
Post-Care Instructions: I reviewed with the patient in detail post-care instructions. Patient is to wear sunprotection, and avoid picking at any of the treated lesions. Pt may apply Vaseline to crusted or scabbing areas.  They were told if any of the lesions fail to resolve to f/u and have them evaluated.

## 2021-08-18 ENCOUNTER — APPOINTMENT (RX ONLY)
Dept: URBAN - NONMETROPOLITAN AREA CLINIC 4 | Facility: CLINIC | Age: 76
Setting detail: DERMATOLOGY
End: 2021-08-18

## 2021-08-18 DIAGNOSIS — D22 MELANOCYTIC NEVI: ICD-10-CM

## 2021-08-18 DIAGNOSIS — D18.0 HEMANGIOMA: ICD-10-CM

## 2021-08-18 DIAGNOSIS — L57.8 OTHER SKIN CHANGES DUE TO CHRONIC EXPOSURE TO NONIONIZING RADIATION: ICD-10-CM

## 2021-08-18 DIAGNOSIS — L57.0 ACTINIC KERATOSIS: ICD-10-CM

## 2021-08-18 DIAGNOSIS — L82.1 OTHER SEBORRHEIC KERATOSIS: ICD-10-CM

## 2021-08-18 PROBLEM — D22.62 MELANOCYTIC NEVI OF LEFT UPPER LIMB, INCLUDING SHOULDER: Status: ACTIVE | Noted: 2021-08-18

## 2021-08-18 PROBLEM — D22.61 MELANOCYTIC NEVI OF RIGHT UPPER LIMB, INCLUDING SHOULDER: Status: ACTIVE | Noted: 2021-08-18

## 2021-08-18 PROBLEM — D18.01 HEMANGIOMA OF SKIN AND SUBCUTANEOUS TISSUE: Status: ACTIVE | Noted: 2021-08-18

## 2021-08-18 PROBLEM — D22.5 MELANOCYTIC NEVI OF TRUNK: Status: ACTIVE | Noted: 2021-08-18

## 2021-08-18 PROCEDURE — 99213 OFFICE O/P EST LOW 20 MIN: CPT | Mod: 25

## 2021-08-18 PROCEDURE — ? COUNSELING

## 2021-08-18 PROCEDURE — ? SUNSCREEN RECOMMENDATIONS

## 2021-08-18 PROCEDURE — 17004 DESTROY PREMAL LESIONS 15/>: CPT

## 2021-08-18 PROCEDURE — ? LIQUID NITROGEN

## 2021-08-18 ASSESSMENT — LOCATION SIMPLE DESCRIPTION DERM
LOCATION SIMPLE: LEFT FOREHEAD
LOCATION SIMPLE: RIGHT HAND
LOCATION SIMPLE: LEFT HAND
LOCATION SIMPLE: ABDOMEN
LOCATION SIMPLE: RIGHT UPPER ARM
LOCATION SIMPLE: RIGHT FOREARM
LOCATION SIMPLE: RIGHT CHEEK
LOCATION SIMPLE: RIGHT UPPER BACK
LOCATION SIMPLE: LEFT CHEEK
LOCATION SIMPLE: RIGHT ZYGOMA
LOCATION SIMPLE: LEFT UPPER ARM
LOCATION SIMPLE: UPPER BACK
LOCATION SIMPLE: RIGHT ELBOW
LOCATION SIMPLE: RIGHT FOREHEAD
LOCATION SIMPLE: LEFT FOREARM

## 2021-08-18 ASSESSMENT — LOCATION DETAILED DESCRIPTION DERM
LOCATION DETAILED: RIGHT SUPERIOR UPPER BACK
LOCATION DETAILED: RIGHT VENTRAL PROXIMAL FOREARM
LOCATION DETAILED: LEFT VENTRAL PROXIMAL FOREARM
LOCATION DETAILED: RIGHT CENTRAL MALAR CHEEK
LOCATION DETAILED: RIGHT ELBOW
LOCATION DETAILED: LEFT PROXIMAL RADIAL DORSAL FOREARM
LOCATION DETAILED: RIGHT CENTRAL ZYGOMA
LOCATION DETAILED: LEFT DISTAL DORSAL FOREARM
LOCATION DETAILED: RIGHT MEDIAL FOREHEAD
LOCATION DETAILED: LEFT DISTAL POSTERIOR UPPER ARM
LOCATION DETAILED: LEFT ANTERIOR PROXIMAL UPPER ARM
LOCATION DETAILED: LEFT PROXIMAL POSTERIOR UPPER ARM
LOCATION DETAILED: RIGHT DISTAL DORSAL FOREARM
LOCATION DETAILED: RIGHT ANTERIOR DISTAL UPPER ARM
LOCATION DETAILED: RIGHT PROXIMAL DORSAL FOREARM
LOCATION DETAILED: RIGHT DISTAL POSTERIOR UPPER ARM
LOCATION DETAILED: LEFT PROXIMAL DORSAL FOREARM
LOCATION DETAILED: LEFT FOREHEAD
LOCATION DETAILED: LEFT INFERIOR CENTRAL MALAR CHEEK
LOCATION DETAILED: EPIGASTRIC SKIN
LOCATION DETAILED: LEFT RADIAL DORSAL HAND
LOCATION DETAILED: RIGHT RADIAL DORSAL HAND
LOCATION DETAILED: SUPERIOR THORACIC SPINE
LOCATION DETAILED: RIGHT PROXIMAL POSTERIOR UPPER ARM
LOCATION DETAILED: RIGHT INFERIOR CENTRAL MALAR CHEEK
LOCATION DETAILED: LEFT SUPERIOR CENTRAL BUCCAL CHEEK
LOCATION DETAILED: RIGHT ULNAR DORSAL HAND
LOCATION DETAILED: INFERIOR THORACIC SPINE

## 2021-08-18 ASSESSMENT — LOCATION ZONE DERM
LOCATION ZONE: TRUNK
LOCATION ZONE: FACE
LOCATION ZONE: ARM
LOCATION ZONE: HAND

## 2021-08-18 NOTE — PROCEDURE: LIQUID NITROGEN
Number Of Freeze-Thaw Cycles: 1 freeze-thaw cycle
Show Applicator Variable?: Yes
Render Note In Bullet Format When Appropriate: No
No significant past surgical history
Post-Care Instructions: I reviewed with the patient in detail post-care instructions. Patient is to wear sunprotection, and avoid picking at any of the treated lesions. Pt may apply Vaseline to crusted or scabbing areas.  They were told if any of the lesions fail to resolve to f/u and have them evaluated.
Duration Of Freeze Thaw-Cycle (Seconds): 1
Aperture Size (Optional): C
Detail Level: Detailed
Consent: The patient's consent was obtained including but not limited to risks of crusting, scabbing, blistering, scarring, darker or lighter pigmentary change, recurrence, incomplete removal and infection.

## 2021-11-05 ENCOUNTER — PES CALL (OUTPATIENT)
Dept: ADMINISTRATIVE | Facility: CLINIC | Age: 76
End: 2021-11-05
Payer: MEDICARE

## 2021-12-01 NOTE — NURSING
Two CBGs taken; blood sugar >500. Team notified and awaiting call back. Patient disoriented, agitated, and slurring speech.    What Type Of Note Output Would You Prefer (Optional)?: Standard Output Hpi Title: Evaluation of Skin Lesions How Severe Are Your Spot(S)?: mild Have Your Spot(S) Been Treated In The Past?: has not been treated

## 2021-12-02 ENCOUNTER — PATIENT OUTREACH (OUTPATIENT)
Dept: ADMINISTRATIVE | Facility: HOSPITAL | Age: 76
End: 2021-12-02
Payer: MEDICARE

## 2021-12-02 DIAGNOSIS — E11.9 DIABETES MELLITUS WITHOUT COMPLICATION: Primary | ICD-10-CM

## 2021-12-06 ENCOUNTER — PATIENT OUTREACH (OUTPATIENT)
Dept: ADMINISTRATIVE | Facility: HOSPITAL | Age: 76
End: 2021-12-06
Payer: MEDICARE

## 2022-01-13 ENCOUNTER — PES CALL (OUTPATIENT)
Dept: ADMINISTRATIVE | Facility: CLINIC | Age: 77
End: 2022-01-13
Payer: MEDICARE

## 2022-01-13 ENCOUNTER — PATIENT OUTREACH (OUTPATIENT)
Dept: ADMINISTRATIVE | Facility: HOSPITAL | Age: 77
End: 2022-01-13
Payer: MEDICARE

## 2022-02-01 ENCOUNTER — PATIENT OUTREACH (OUTPATIENT)
Dept: ADMINISTRATIVE | Facility: HOSPITAL | Age: 77
End: 2022-02-01
Payer: MEDICARE

## 2022-02-04 ENCOUNTER — PATIENT OUTREACH (OUTPATIENT)
Dept: ADMINISTRATIVE | Facility: HOSPITAL | Age: 77
End: 2022-02-04
Payer: MEDICARE

## 2022-02-08 ENCOUNTER — PATIENT OUTREACH (OUTPATIENT)
Dept: ADMINISTRATIVE | Facility: HOSPITAL | Age: 77
End: 2022-02-08
Payer: MEDICARE

## 2022-02-10 ENCOUNTER — PATIENT OUTREACH (OUTPATIENT)
Dept: ADMINISTRATIVE | Facility: HOSPITAL | Age: 77
End: 2022-02-10
Payer: MEDICARE

## 2022-03-09 ENCOUNTER — APPOINTMENT (RX ONLY)
Dept: URBAN - NONMETROPOLITAN AREA CLINIC 4 | Facility: CLINIC | Age: 77
Setting detail: DERMATOLOGY
End: 2022-03-09

## 2022-03-09 DIAGNOSIS — D18.0 HEMANGIOMA: ICD-10-CM

## 2022-03-09 DIAGNOSIS — L57.0 ACTINIC KERATOSIS: ICD-10-CM

## 2022-03-09 DIAGNOSIS — L57.8 OTHER SKIN CHANGES DUE TO CHRONIC EXPOSURE TO NONIONIZING RADIATION: ICD-10-CM

## 2022-03-09 DIAGNOSIS — Z80.8 FAMILY HISTORY OF MALIGNANT NEOPLASM OF OTHER ORGANS OR SYSTEMS: ICD-10-CM

## 2022-03-09 DIAGNOSIS — D22 MELANOCYTIC NEVI: ICD-10-CM

## 2022-03-09 DIAGNOSIS — L82.1 OTHER SEBORRHEIC KERATOSIS: ICD-10-CM

## 2022-03-09 PROBLEM — D22.62 MELANOCYTIC NEVI OF LEFT UPPER LIMB, INCLUDING SHOULDER: Status: ACTIVE | Noted: 2022-03-09

## 2022-03-09 PROBLEM — D18.01 HEMANGIOMA OF SKIN AND SUBCUTANEOUS TISSUE: Status: ACTIVE | Noted: 2022-03-09

## 2022-03-09 PROBLEM — D22.5 MELANOCYTIC NEVI OF TRUNK: Status: ACTIVE | Noted: 2022-03-09

## 2022-03-09 PROBLEM — D22.61 MELANOCYTIC NEVI OF RIGHT UPPER LIMB, INCLUDING SHOULDER: Status: ACTIVE | Noted: 2022-03-09

## 2022-03-09 PROCEDURE — ? SUNSCREEN RECOMMENDATIONS

## 2022-03-09 PROCEDURE — 99213 OFFICE O/P EST LOW 20 MIN: CPT | Mod: 25

## 2022-03-09 PROCEDURE — ? COUNSELING

## 2022-03-09 PROCEDURE — 17004 DESTROY PREMAL LESIONS 15/>: CPT

## 2022-03-09 PROCEDURE — ? ADDITIONAL NOTES

## 2022-03-09 PROCEDURE — ? LIQUID NITROGEN

## 2022-03-09 ASSESSMENT — LOCATION SIMPLE DESCRIPTION DERM
LOCATION SIMPLE: LEFT CHEEK
LOCATION SIMPLE: RIGHT FOREHEAD
LOCATION SIMPLE: UPPER BACK
LOCATION SIMPLE: RIGHT FOREARM
LOCATION SIMPLE: LEFT ZYGOMA
LOCATION SIMPLE: RIGHT UPPER BACK
LOCATION SIMPLE: LEFT UPPER ARM
LOCATION SIMPLE: RIGHT HAND
LOCATION SIMPLE: LEFT HAND
LOCATION SIMPLE: LEFT FOREARM
LOCATION SIMPLE: RIGHT UPPER ARM
LOCATION SIMPLE: ABDOMEN

## 2022-03-09 ASSESSMENT — LOCATION DETAILED DESCRIPTION DERM
LOCATION DETAILED: LEFT PROXIMAL POSTERIOR UPPER ARM
LOCATION DETAILED: RIGHT ANTERIOR DISTAL UPPER ARM
LOCATION DETAILED: LEFT ULNAR DORSAL HAND
LOCATION DETAILED: LEFT LATERAL ZYGOMA
LOCATION DETAILED: LEFT INFERIOR CENTRAL MALAR CHEEK
LOCATION DETAILED: LEFT CENTRAL MALAR CHEEK
LOCATION DETAILED: RIGHT SUPERIOR UPPER BACK
LOCATION DETAILED: SUPERIOR THORACIC SPINE
LOCATION DETAILED: LEFT VENTRAL PROXIMAL FOREARM
LOCATION DETAILED: LEFT ANTERIOR PROXIMAL UPPER ARM
LOCATION DETAILED: 2ND WEB SPACE RIGHT HAND
LOCATION DETAILED: 3RD WEB SPACE RIGHT HAND
LOCATION DETAILED: EPIGASTRIC SKIN
LOCATION DETAILED: RIGHT VENTRAL PROXIMAL FOREARM
LOCATION DETAILED: RIGHT ULNAR DORSAL HAND
LOCATION DETAILED: RIGHT DISTAL DORSAL FOREARM
LOCATION DETAILED: RIGHT INFERIOR LATERAL FOREHEAD
LOCATION DETAILED: INFERIOR THORACIC SPINE
LOCATION DETAILED: LEFT DISTAL POSTERIOR UPPER ARM
LOCATION DETAILED: RIGHT RADIAL DORSAL HAND
LOCATION DETAILED: RIGHT PROXIMAL POSTERIOR UPPER ARM
LOCATION DETAILED: RIGHT DISTAL POSTERIOR UPPER ARM

## 2022-03-09 ASSESSMENT — LOCATION ZONE DERM
LOCATION ZONE: TRUNK
LOCATION ZONE: ARM
LOCATION ZONE: HAND
LOCATION ZONE: FACE

## 2022-03-09 NOTE — HPI: SKIN LESIONS
Is This A New Presentation, Or A Follow-Up?: Growths
Additional History: Patient presents for upper body exam.
Which Family Member (Optional)?: Father

## 2022-03-09 NOTE — PROCEDURE: LIQUID NITROGEN
Post-Care Instructions: I reviewed with the patient in detail post-care instructions. Patient is to wear sunprotection, and avoid picking at any of the treated lesions. Pt may apply Vaseline to crusted or scabbing areas.
Render Post-Care Instructions In Note?: no
Consent: The patient's consent was obtained including but not limited to risks of crusting, scabbing, blistering, scarring, darker or lighter pigmentary change, recurrence, incomplete removal and infection.
Duration Of Freeze Thaw-Cycle (Seconds): 0
Total Number Of Aks Treated: 5
Detail Level: Zone
Number Of Freeze-Thaw Cycles: 1 freeze-thaw cycle
Detail Level: Detailed
Show Aperture Variable?: Yes
Aperture Size (Optional): C
Post-Care Instructions: I reviewed with the patient in detail post-care instructions. Patient is to wear sunprotection, and avoid picking at any of the treated lesions. Pt may apply Vaseline to crusted or scabbing areas.  They were told if any of the lesions fail to resolve to f/u and have them evaluated.
Duration Of Freeze Thaw-Cycle (Seconds): 1

## 2022-03-30 ENCOUNTER — PATIENT OUTREACH (OUTPATIENT)
Dept: ADMINISTRATIVE | Facility: HOSPITAL | Age: 77
End: 2022-03-30
Payer: MEDICARE

## 2022-04-11 NOTE — ED PROVIDER NOTES
Source of History:  patient    Chief complaint:  Chest Pain (Patient reports crushing chest pain for the past 2 hours. Radiates to shoulders. Reports nausea and vomiting. EMS gave 3 nitros, 1inch of paste and 324mg of ASA en route. )      HPI:  Vicente Luciano is a 73 y.o. male presenting with 4 hr of chest pain radiating to both shoulders.  States the pain is stinging, going around the ribs bilaterally and up into the anterior superior shoulders.  He was at rest when it started, no exacerbating or alleviating factors.  He did not have any nitroglycerin to take home, however EMS gave him several doses without improvement.  He notes he has had a recent cough for about a week, but currently denies any shortness of breath.  Notes 1 episode of vomiting. Currently no nausea or vomiting. Rates current pain as 9/10.    ROS: As per HPI and below:    General: No fever.  No chills.  Eyes: No visual changes.  Head: No headache.    Integument: No rashes or lesions.  Chest: No shortness of breath.  Cardiovascular: chest pain.  Abdomen: No abdominal pain.  No nausea or vomiting.  Urinary: No abnormal urination.  Neurologic: No focal weakness.  No numbness.  Hematologic: No easy bruising.  Endocrine: No excessive thirst or urination.      Review of patient's allergies indicates:   Allergen Reactions    Pcn [penicillins] Itching       No current facility-administered medications on file prior to encounter.      Current Outpatient Medications on File Prior to Encounter   Medication Sig Dispense Refill    cholecalciferol, vitamin D3, 5,000 unit Tab Take 5,000 Units by mouth once daily.      clopidogrel (PLAVIX) 75 mg tablet Take 1 tablet (75 mg total) by mouth once daily. 90 tablet 3    insulin aspart U-100 (NOVOLOG) 100 unit/mL (3 mL) InPn pen Inject 10 Units into the skin three times daily with food. 1 Box 5    insulin detemir U-100 (LEVEMIR FLEXTOUCH) 100 unit/mL (3 mL) SubQ InPn pen Inject 35 Units into the skin once daily.  3 Box 3    losartan (COZAAR) 50 MG tablet Take 1 tablet (50 mg total) by mouth once daily. 90 tablet 3    metFORMIN (GLUCOPHAGE) 1000 MG tablet Take 1 tablet (1,000 mg total) by mouth 2 (two) times daily with meals. 180 tablet 3    metoprolol succinate (TOPROL-XL) 25 MG 24 hr tablet Take 2 tablets (50 mg total) by mouth once daily. 30 tablet 5    sertraline (ZOLOFT) 100 MG tablet Take 1 tablet (100 mg total) by mouth once daily. 90 tablet 3    simvastatin (ZOCOR) 80 MG tablet Take 80 mg by mouth every evening.      tiZANidine (ZANAFLEX) 4 MG tablet 1/2-1 tab at night as needed for muscle spasm 30 tablet 2       PMH:  As per HPI and below:  Past Medical History:   Diagnosis Date    CAD (coronary artery disease) 2014    PTCA/stent     CHRONIC BRONCHITIS     Diabetes mellitus, type 2     DJD (degenerative joint disease) of lumbar spine 2014    HTN (hypertension), benign     MS (multiple sclerosis)      Past Surgical History:   Procedure Laterality Date    CARDIAC SURGERY      CORONARY STENT PLACEMENT      LUNG SURGERY         Social History     Socioeconomic History    Marital status:      Spouse name: Not on file    Number of children: 3    Years of education: Not on file    Highest education level: Not on file   Occupational History    Not on file   Social Needs    Financial resource strain: Not on file    Food insecurity:     Worry: Not on file     Inability: Not on file    Transportation needs:     Medical: Not on file     Non-medical: Not on file   Tobacco Use    Smoking status: Former Smoker     Packs/day: 2.00     Years: 22.00     Pack years: 44.00     Types: Cigarettes     Last attempt to quit: 1989     Years since quittin.2    Smokeless tobacco: Never Used    Tobacco comment: Quit smoking (2 pk/day)     Substance and Sexual Activity    Alcohol use: Yes     Alcohol/week: 1.0 standard drinks     Types: 1 Cans of beer per week     Comment:  rare    Drug use: No    Sexual activity: Yes     Partners: Female   Lifestyle    Physical activity:     Days per week: Not on file     Minutes per session: Not on file    Stress: Not on file   Relationships    Social connections:     Talks on phone: Not on file     Gets together: Not on file     Attends Congregation service: Not on file     Active member of club or organization: Not on file     Attends meetings of clubs or organizations: Not on file     Relationship status: Not on file   Other Topics Concern    Not on file   Social History Narrative    Disabled.    Uses a Rolling Walker.       No family history on file.    Physical Exam:    Vitals:    11/14/19 0303   BP: (!) 148/109   Pulse: 74   Resp: 12   Temp: 98.3 °F (36.8 °C)     Appearance: No acute distress.  Skin: No rashes seen.  Good turgor.  No abrasions.  No ecchymoses.  Eyes: No conjunctival injection. EOMI, PERRL.  ENT: Oropharynx clear.    Chest: Clear to auscultation bilaterally.  Good air movement.  No wheezes.  No rhonchi.  Cardiovascular: Regular rate and rhythm.  No murmurs. No gallops. No rubs.  Abdomen: Soft.  Not distended.  Nontender.  No guarding.  No rebound. No Masses  Musculoskeletal: Good range of motion all joints.  No deformities.  Neck supple.  No meningismus.  Neurologic: Moves all extremities.  Normal sensation.  No facial droop.  Normal speech.    Mental Status:  Alert and oriented x 3.  Appropriate, conversant.          Laboratory Studies:  Labs Reviewed   CBC W/ AUTO DIFFERENTIAL   COMPREHENSIVE METABOLIC PANEL   TROPONIN I   TROPONIN I   B-TYPE NATRIURETIC PEPTIDE       I decided to obtain the old medical records.  Reviewed and summarized the old medical record and it showed last catheterization 2008, 2D echo 2018 with normal EF and no wall motion abnormalities.  I independently interpreted the CXR:  No obviou sfx or PTX  I independently interpreted the EKG:  Normal sinus rhythm, no ST changes, normal intervals, no T-wave  inversions.    Imaging Results    None         Medications Given:  Medications   ketorolac injection 15 mg (has no administration in time range)       Discussed with:  Patient    MDM:    73 y.o. male with chest pain for several hours prior to arrival.  Chest x-ray reveals no obvious pneumonia or fluid overload is the cause, 2 troponin several hours apart are negative, making acute coronary syndrome less likely.  Patient noted been coughing for several days, and has a history of COPD, I think the most likely cause musculoskeletal strain for this chest pain.  Other labs noncontributory, vitals remained stable while in the ED.  Patient will be discharged home for outpatient follow-up.  Advised pt to follow up with PCP or return if concerning symptoms arise. Pt understands and agrees with plan. Will d/c home.        Diagnostic Impression:    1. Chest pain               Arnie Palafox MD  11/14/19 0699     5

## 2022-08-10 ENCOUNTER — PATIENT OUTREACH (OUTPATIENT)
Dept: ADMINISTRATIVE | Facility: HOSPITAL | Age: 77
End: 2022-08-10
Payer: MEDICARE

## 2022-09-14 ENCOUNTER — PATIENT OUTREACH (OUTPATIENT)
Dept: ADMINISTRATIVE | Facility: HOSPITAL | Age: 77
End: 2022-09-14
Payer: MEDICARE

## 2022-10-12 ENCOUNTER — APPOINTMENT (RX ONLY)
Dept: URBAN - NONMETROPOLITAN AREA CLINIC 13 | Facility: CLINIC | Age: 77
Setting detail: DERMATOLOGY
End: 2022-10-12

## 2022-10-12 DIAGNOSIS — D485 NEOPLASM OF UNCERTAIN BEHAVIOR OF SKIN: ICD-10-CM

## 2022-10-12 DIAGNOSIS — D22 MELANOCYTIC NEVI: ICD-10-CM

## 2022-10-12 DIAGNOSIS — D18.0 HEMANGIOMA: ICD-10-CM

## 2022-10-12 DIAGNOSIS — L82.1 OTHER SEBORRHEIC KERATOSIS: ICD-10-CM

## 2022-10-12 DIAGNOSIS — L57.8 OTHER SKIN CHANGES DUE TO CHRONIC EXPOSURE TO NONIONIZING RADIATION: ICD-10-CM

## 2022-10-12 DIAGNOSIS — L57.0 ACTINIC KERATOSIS: ICD-10-CM

## 2022-10-12 PROBLEM — D22.9 MELANOCYTIC NEVI, UNSPECIFIED: Status: ACTIVE | Noted: 2022-10-12

## 2022-10-12 PROBLEM — D48.5 NEOPLASM OF UNCERTAIN BEHAVIOR OF SKIN: Status: ACTIVE | Noted: 2022-10-12

## 2022-10-12 PROBLEM — D18.01 HEMANGIOMA OF SKIN AND SUBCUTANEOUS TISSUE: Status: ACTIVE | Noted: 2022-10-12

## 2022-10-12 PROCEDURE — ? LIQUID NITROGEN

## 2022-10-12 PROCEDURE — 99213 OFFICE O/P EST LOW 20 MIN: CPT | Mod: 25

## 2022-10-12 PROCEDURE — ? BIOPSY BY SHAVE METHOD

## 2022-10-12 PROCEDURE — 69100 BIOPSY OF EXTERNAL EAR: CPT

## 2022-10-12 PROCEDURE — ? COUNSELING

## 2022-10-12 PROCEDURE — ? SUNSCREEN RECOMMENDATIONS

## 2022-10-12 PROCEDURE — 17004 DESTROY PREMAL LESIONS 15/>: CPT

## 2022-10-12 ASSESSMENT — LOCATION DETAILED DESCRIPTION DERM
LOCATION DETAILED: SUPERIOR LUMBAR SPINE
LOCATION DETAILED: RIGHT INFERIOR HELIX
LOCATION DETAILED: SUPERIOR THORACIC SPINE
LOCATION DETAILED: LEFT CENTRAL TEMPLE
LOCATION DETAILED: LEFT ULNAR DORSAL HAND
LOCATION DETAILED: RIGHT ULNAR DORSAL HAND
LOCATION DETAILED: LEFT PROXIMAL DORSAL FOREARM
LOCATION DETAILED: RIGHT PROXIMAL RADIAL DORSAL FOREARM
LOCATION DETAILED: 3RD WEB SPACE LEFT HAND
LOCATION DETAILED: LEFT DORSAL WRIST
LOCATION DETAILED: LEFT DORSAL RING METACARPOPHALANGEAL JOINT
LOCATION DETAILED: RIGHT INFERIOR CENTRAL MALAR CHEEK
LOCATION DETAILED: RIGHT DISTAL DORSAL FOREARM
LOCATION DETAILED: RIGHT SUPERIOR LATERAL MALAR CHEEK
LOCATION DETAILED: LEFT LATERAL MALAR CHEEK
LOCATION DETAILED: RIGHT DISTAL POSTERIOR UPPER ARM
LOCATION DETAILED: LEFT RADIAL DORSAL HAND
LOCATION DETAILED: RIGHT LATERAL MANDIBULAR CHEEK
LOCATION DETAILED: LEFT INFERIOR POSTAURICULAR SKIN
LOCATION DETAILED: RIGHT DORSAL WRIST
LOCATION DETAILED: LEFT MEDIAL TEMPLE
LOCATION DETAILED: RIGHT DISTAL RADIAL DORSAL FOREARM
LOCATION DETAILED: LEFT SUPERIOR HELIX
LOCATION DETAILED: LEFT DISTAL DORSAL FOREARM
LOCATION DETAILED: RIGHT RADIAL DORSAL HAND
LOCATION DETAILED: RIGHT SUPERIOR CRUS OF ANTIHELIX
LOCATION DETAILED: LEFT DORSAL MIDDLE METACARPOPHALANGEAL JOINT
LOCATION DETAILED: INFERIOR THORACIC SPINE

## 2022-10-12 ASSESSMENT — LOCATION ZONE DERM
LOCATION ZONE: EAR
LOCATION ZONE: ARM
LOCATION ZONE: TRUNK
LOCATION ZONE: SCALP
LOCATION ZONE: FACE
LOCATION ZONE: HAND

## 2022-10-12 ASSESSMENT — LOCATION SIMPLE DESCRIPTION DERM
LOCATION SIMPLE: LEFT TEMPLE
LOCATION SIMPLE: LEFT EAR
LOCATION SIMPLE: SCALP
LOCATION SIMPLE: RIGHT FOREARM
LOCATION SIMPLE: RIGHT UPPER ARM
LOCATION SIMPLE: RIGHT HAND
LOCATION SIMPLE: LEFT CHEEK
LOCATION SIMPLE: RIGHT WRIST
LOCATION SIMPLE: LEFT WRIST
LOCATION SIMPLE: LEFT FOREARM
LOCATION SIMPLE: LOWER BACK
LOCATION SIMPLE: RIGHT CHEEK
LOCATION SIMPLE: LEFT HAND
LOCATION SIMPLE: UPPER BACK
LOCATION SIMPLE: RIGHT EAR

## 2022-10-12 NOTE — PROCEDURE: LIQUID NITROGEN
Consent: The patient's consent was obtained including but not limited to risks of crusting, scabbing, blistering, scarring, darker or lighter pigmentary change, recurrence, incomplete removal and infection.
Aperture Size (Optional): C
Show Applicator Variable?: Yes
Number Of Freeze-Thaw Cycles: 1 freeze-thaw cycle
Duration Of Freeze Thaw-Cycle (Seconds): 1
Render Post-Care Instructions In Note?: no
Post-Care Instructions: I reviewed with the patient in detail post-care instructions. Patient is to wear sunprotection, and avoid picking at any of the treated lesions. Pt may apply Vaseline to crusted or scabbing areas.  They were told if any of the lesions fail to resolve to f/u and have them evaluated.
Detail Level: Detailed

## 2022-10-12 NOTE — PROCEDURE: MIPS QUALITY
Quality 110: Preventive Care And Screening: Influenza Immunization: Influenza Immunization Administered during Influenza season
Detail Level: Detailed
Quality 226: Preventive Care And Screening: Tobacco Use: Screening And Cessation Intervention: Patient screened for tobacco use and is an ex/non-smoker
Quality 111:Pneumonia Vaccination Status For Older Adults: Pneumococcal vaccine (PPSV23) administered on or after patient’s 60th birthday and before the end of the measurement period

## 2022-10-12 NOTE — PROCEDURE: BIOPSY BY SHAVE METHOD
Detail Level: Detailed
Depth Of Biopsy: dermis
Was A Bandage Applied: Yes
Size Of Lesion In Cm: 0.6
X Size Of Lesion In Cm: 0
Biopsy Type: H and E
Biopsy Method: double edge Personna blade
Anesthesia Type: 1% lidocaine with epinephrine
Anesthesia Volume In Cc: 0.5
Hemostasis: Aluminum Chloride
Wound Care: Petrolatum
Dressing: bandage
Destruction After The Procedure: No
Type Of Destruction Used: Curettage
Curettage Text: The wound bed was treated with curettage after the biopsy was performed.
Cryotherapy Text: The wound bed was treated with cryotherapy after the biopsy was performed.
Electrodesiccation Text: The wound bed was treated with electrodesiccation after the biopsy was performed.
Electrodesiccation And Curettage Text: The wound bed was treated with electrodesiccation and curettage after the biopsy was performed.
Silver Nitrate Text: The wound bed was treated with silver nitrate after the biopsy was performed.
Lab: [id number]
Lab Facility: [id number]
Consent: Verbal  consent was obtained and risks were reviewed including but not limited to scarring, infection, bleeding, scabbing, incomplete removal, nerve damage and allergy to anesthesia.
Post-Care Instructions: I reviewed with the patient in detail post-care instructions. Patient is to keep the biopsy site dry overnight, and then apply Vaseline  twice daily until healed. Patient advised to call the office if becomes tender or concerns of infection.
Notification Instructions: Patient will be notified of biopsy results. However, patient instructed to call the office if not contacted within 2 weeks.
Billing Type: Third-Party Bill
Information: Selecting Yes will display possible errors in your note based on the variables you have selected. This validation is only offered as a suggestion for you. PLEASE NOTE THAT THE VALIDATION TEXT WILL BE REMOVED WHEN YOU FINALIZE YOUR NOTE. IF YOU WANT TO FAX A PRELIMINARY NOTE YOU WILL NEED TO TOGGLE THIS TO 'NO' IF YOU DO NOT WANT IT IN YOUR FAXED NOTE.

## 2022-11-08 ENCOUNTER — PATIENT OUTREACH (OUTPATIENT)
Dept: ADMINISTRATIVE | Facility: HOSPITAL | Age: 77
End: 2022-11-08
Payer: MEDICARE

## 2023-01-11 ENCOUNTER — APPOINTMENT (RX ONLY)
Dept: URBAN - NONMETROPOLITAN AREA CLINIC 14 | Facility: CLINIC | Age: 78
Setting detail: DERMATOLOGY
End: 2023-01-11

## 2023-01-11 DIAGNOSIS — L57.0 ACTINIC KERATOSIS: ICD-10-CM

## 2023-01-11 DIAGNOSIS — L57.8 OTHER SKIN CHANGES DUE TO CHRONIC EXPOSURE TO NONIONIZING RADIATION: ICD-10-CM

## 2023-01-11 PROCEDURE — 99212 OFFICE O/P EST SF 10 MIN: CPT | Mod: 25

## 2023-01-11 PROCEDURE — ? SUNSCREEN RECOMMENDATIONS

## 2023-01-11 PROCEDURE — 17004 DESTROY PREMAL LESIONS 15/>: CPT

## 2023-01-11 PROCEDURE — ? COUNSELING

## 2023-01-11 PROCEDURE — ? LIQUID NITROGEN

## 2023-01-11 ASSESSMENT — LOCATION SIMPLE DESCRIPTION DERM
LOCATION SIMPLE: RIGHT FOREHEAD
LOCATION SIMPLE: SCALP
LOCATION SIMPLE: LEFT ZYGOMA
LOCATION SIMPLE: RIGHT CHEEK
LOCATION SIMPLE: LEFT ELBOW
LOCATION SIMPLE: LEFT HAND
LOCATION SIMPLE: LEFT WRIST
LOCATION SIMPLE: LOWER BACK
LOCATION SIMPLE: LEFT FOREHEAD
LOCATION SIMPLE: LEFT CHEEK
LOCATION SIMPLE: RIGHT FOREARM
LOCATION SIMPLE: LEFT EYEBROW
LOCATION SIMPLE: RIGHT WRIST
LOCATION SIMPLE: LEFT FOREARM
LOCATION SIMPLE: RIGHT HAND

## 2023-01-11 ASSESSMENT — LOCATION DETAILED DESCRIPTION DERM
LOCATION DETAILED: LEFT DORSAL WRIST
LOCATION DETAILED: LEFT VENTRAL PROXIMAL FOREARM
LOCATION DETAILED: RIGHT PROXIMAL RADIAL DORSAL FOREARM
LOCATION DETAILED: RIGHT DORSAL WRIST
LOCATION DETAILED: RIGHT DISTAL DORSAL FOREARM
LOCATION DETAILED: LEFT DISTAL DORSAL FOREARM
LOCATION DETAILED: LEFT CENTRAL ZYGOMA
LOCATION DETAILED: RIGHT PROXIMAL DORSAL FOREARM
LOCATION DETAILED: LEFT PROXIMAL RADIAL DORSAL FOREARM
LOCATION DETAILED: RIGHT DISTAL RADIAL DORSAL FOREARM
LOCATION DETAILED: LEFT INFERIOR FRONTAL SCALP
LOCATION DETAILED: LEFT LATERAL ZYGOMA
LOCATION DETAILED: LEFT PROXIMAL DORSAL FOREARM
LOCATION DETAILED: LEFT SUPERIOR FOREHEAD
LOCATION DETAILED: LEFT ELBOW
LOCATION DETAILED: LEFT RADIAL DORSAL HAND
LOCATION DETAILED: SUPERIOR LUMBAR SPINE
LOCATION DETAILED: 1ST WEB SPACE LEFT HAND
LOCATION DETAILED: LEFT ULNAR DORSAL HAND
LOCATION DETAILED: LEFT DISTAL RADIAL DORSAL FOREARM
LOCATION DETAILED: RIGHT VENTRAL PROXIMAL FOREARM
LOCATION DETAILED: LEFT DORSAL INDEX METACARPOPHALANGEAL JOINT
LOCATION DETAILED: LEFT INFERIOR CENTRAL MALAR CHEEK
LOCATION DETAILED: RIGHT ULNAR DORSAL HAND
LOCATION DETAILED: RIGHT CENTRAL MALAR CHEEK
LOCATION DETAILED: LEFT LATERAL EYEBROW
LOCATION DETAILED: LEFT DORSAL RING METACARPOPHALANGEAL JOINT
LOCATION DETAILED: LEFT LATERAL MALAR CHEEK
LOCATION DETAILED: RIGHT FOREHEAD
LOCATION DETAILED: RIGHT RADIAL DORSAL HAND

## 2023-01-11 ASSESSMENT — LOCATION ZONE DERM
LOCATION ZONE: SCALP
LOCATION ZONE: TRUNK
LOCATION ZONE: FACE
LOCATION ZONE: ARM
LOCATION ZONE: HAND

## 2023-01-11 NOTE — PROCEDURE: LIQUID NITROGEN
Aperture Size (Optional): C
Show Applicator Variable?: Yes
Consent: The patient's consent was obtained including but not limited to risks of crusting, scabbing, blistering, scarring, darker or lighter pigmentary change, recurrence, incomplete removal and infection.
Detail Level: Detailed
Render Post-Care Instructions In Note?: no
Number Of Freeze-Thaw Cycles: 1 freeze-thaw cycle
Post-Care Instructions: I reviewed with the patient in detail post-care instructions. Patient is to wear sunprotection, and avoid picking at any of the treated lesions. Pt may apply Vaseline to crusted or scabbing areas.  They were told if any of the lesions fail to resolve to f/u and have them evaluated.
Duration Of Freeze Thaw-Cycle (Seconds): 1

## 2023-08-02 ENCOUNTER — APPOINTMENT (RX ONLY)
Dept: URBAN - NONMETROPOLITAN AREA CLINIC 13 | Facility: CLINIC | Age: 78
Setting detail: DERMATOLOGY
End: 2023-08-02

## 2023-08-02 DIAGNOSIS — D18.0 HEMANGIOMA: ICD-10-CM

## 2023-08-02 DIAGNOSIS — L57.0 ACTINIC KERATOSIS: ICD-10-CM

## 2023-08-02 DIAGNOSIS — L57.8 OTHER SKIN CHANGES DUE TO CHRONIC EXPOSURE TO NONIONIZING RADIATION: ICD-10-CM

## 2023-08-02 DIAGNOSIS — D22 MELANOCYTIC NEVI: ICD-10-CM

## 2023-08-02 PROBLEM — D22.5 MELANOCYTIC NEVI OF TRUNK: Status: ACTIVE | Noted: 2023-08-02

## 2023-08-02 PROBLEM — D18.01 HEMANGIOMA OF SKIN AND SUBCUTANEOUS TISSUE: Status: ACTIVE | Noted: 2023-08-02

## 2023-08-02 PROCEDURE — 17003 DESTRUCT PREMALG LES 2-14: CPT

## 2023-08-02 PROCEDURE — ? COUNSELING

## 2023-08-02 PROCEDURE — ? SUNSCREEN RECOMMENDATIONS

## 2023-08-02 PROCEDURE — 17000 DESTRUCT PREMALG LESION: CPT

## 2023-08-02 PROCEDURE — ? LIQUID NITROGEN

## 2023-08-02 PROCEDURE — 99213 OFFICE O/P EST LOW 20 MIN: CPT | Mod: 25

## 2023-08-02 ASSESSMENT — LOCATION SIMPLE DESCRIPTION DERM
LOCATION SIMPLE: RIGHT UPPER ARM
LOCATION SIMPLE: LEFT FOREHEAD
LOCATION SIMPLE: LEFT EAR
LOCATION SIMPLE: LEFT UPPER ARM
LOCATION SIMPLE: RIGHT EAR
LOCATION SIMPLE: LEFT UPPER BACK
LOCATION SIMPLE: RIGHT BACK
LOCATION SIMPLE: SCALP

## 2023-08-02 ASSESSMENT — LOCATION ZONE DERM
LOCATION ZONE: TRUNK
LOCATION ZONE: ARM
LOCATION ZONE: SCALP
LOCATION ZONE: EAR
LOCATION ZONE: FACE

## 2023-08-02 ASSESSMENT — LOCATION DETAILED DESCRIPTION DERM
LOCATION DETAILED: RIGHT CRUS OF HELIX
LOCATION DETAILED: LEFT CRUS OF HELIX
LOCATION DETAILED: LEFT SUPERIOR UPPER BACK
LOCATION DETAILED: LEFT ANTERIOR DISTAL UPPER ARM
LOCATION DETAILED: RIGHT ANTERIOR DISTAL UPPER ARM
LOCATION DETAILED: LEFT SUPERIOR PARIETAL SCALP
LOCATION DETAILED: RIGHT SUPERIOR LATERAL UPPER BACK
LOCATION DETAILED: LEFT INFERIOR FOREHEAD

## 2023-08-02 NOTE — PROCEDURE: MIPS QUALITY
Quality 110: Preventive Care And Screening: Influenza Immunization: Influenza Immunization Administered during Influenza season
Detail Level: Detailed
Quality 111:Pneumonia Vaccination Status For Older Adults: Patient received any pneumococcal conjugate or polysaccharide vaccine on or after their 60th birthday and before the end of the measurement period
Quality 226: Preventive Care And Screening: Tobacco Use: Screening And Cessation Intervention: Patient screened for tobacco use and is an ex/non-smoker
Quality 431: Preventive Care And Screening: Unhealthy Alcohol Use - Screening: Patient not identified as an unhealthy alcohol user when screened for unhealthy alcohol use using a systematic screening method

## 2023-08-02 NOTE — PROCEDURE: LIQUID NITROGEN
Total Number Of Aks Treated: 9
Render Post-Care Instructions In Note?: no
Detail Level: Zone
Consent: The patient's consent was obtained including but not limited to risks of crusting, scabbing, blistering, scarring, darker or lighter pigmentary change, recurrence, incomplete removal and infection.
Duration Of Freeze Thaw-Cycle (Seconds): 0
Post-Care Instructions: I reviewed with the patient in detail post-care instructions. Patient is to wear sunprotection, and avoid picking at any of the treated lesions. Pt may apply Vaseline to crusted or scabbing areas.

## 2024-02-21 ENCOUNTER — APPOINTMENT (RX ONLY)
Dept: URBAN - NONMETROPOLITAN AREA CLINIC 13 | Facility: CLINIC | Age: 79
Setting detail: DERMATOLOGY
End: 2024-02-21

## 2024-02-21 DIAGNOSIS — D18.0 HEMANGIOMA: ICD-10-CM

## 2024-02-21 DIAGNOSIS — D22 MELANOCYTIC NEVI: ICD-10-CM

## 2024-02-21 DIAGNOSIS — L57.8 OTHER SKIN CHANGES DUE TO CHRONIC EXPOSURE TO NONIONIZING RADIATION: ICD-10-CM

## 2024-02-21 DIAGNOSIS — L57.0 ACTINIC KERATOSIS: ICD-10-CM

## 2024-02-21 PROBLEM — D18.01 HEMANGIOMA OF SKIN AND SUBCUTANEOUS TISSUE: Status: ACTIVE | Noted: 2024-02-21

## 2024-02-21 PROBLEM — D22.5 MELANOCYTIC NEVI OF TRUNK: Status: ACTIVE | Noted: 2024-02-21

## 2024-02-21 PROCEDURE — ? COUNSELING

## 2024-02-21 PROCEDURE — 99213 OFFICE O/P EST LOW 20 MIN: CPT | Mod: 25

## 2024-02-21 PROCEDURE — 17004 DESTROY PREMAL LESIONS 15/>: CPT

## 2024-02-21 PROCEDURE — ? LIQUID NITROGEN

## 2024-02-21 PROCEDURE — ? SUNSCREEN RECOMMENDATIONS

## 2024-02-21 ASSESSMENT — LOCATION DETAILED DESCRIPTION DERM
LOCATION DETAILED: LEFT PROXIMAL POSTERIOR UPPER ARM
LOCATION DETAILED: RIGHT CAVUM CONCHA
LOCATION DETAILED: RIGHT DISTAL POSTERIOR UPPER ARM
LOCATION DETAILED: RIGHT SUPERIOR LATERAL UPPER BACK
LOCATION DETAILED: LEFT SUPERIOR UPPER BACK
LOCATION DETAILED: LEFT CYMBA CONCHA
LOCATION DETAILED: LEFT INFERIOR FOREHEAD
LOCATION DETAILED: LEFT MEDIAL FOREHEAD
LOCATION DETAILED: MID-OCCIPITAL SCALP

## 2024-02-21 ASSESSMENT — LOCATION SIMPLE DESCRIPTION DERM
LOCATION SIMPLE: LEFT UPPER ARM
LOCATION SIMPLE: POSTERIOR SCALP
LOCATION SIMPLE: LEFT UPPER BACK
LOCATION SIMPLE: RIGHT BACK
LOCATION SIMPLE: LEFT FOREHEAD
LOCATION SIMPLE: RIGHT EAR
LOCATION SIMPLE: LEFT EAR
LOCATION SIMPLE: RIGHT UPPER ARM

## 2024-02-21 ASSESSMENT — LOCATION ZONE DERM
LOCATION ZONE: TRUNK
LOCATION ZONE: SCALP
LOCATION ZONE: EAR
LOCATION ZONE: ARM
LOCATION ZONE: FACE

## 2024-02-21 NOTE — PROCEDURE: LIQUID NITROGEN
Render In Bullet Format When Appropriate: No
Duration Of Freeze Thaw-Cycle (Seconds): 0
Post-Care Instructions: I reviewed with the patient in detail post-care instructions. Patient is to wear sunprotection, and avoid picking at any of the treated lesions. Pt may apply Vaseline to crusted or scabbing areas.
Total Number Of Aks Treated: 18
Detail Level: Zone
Consent: The patient's consent was obtained including but not limited to risks of crusting, scabbing, blistering, scarring, darker or lighter pigmentary change, recurrence, incomplete removal and infection.

## 2024-05-28 ENCOUNTER — APPOINTMENT (RX ONLY)
Dept: URBAN - NONMETROPOLITAN AREA CLINIC 14 | Facility: CLINIC | Age: 79
Setting detail: DERMATOLOGY
End: 2024-05-28

## 2024-05-28 DIAGNOSIS — Z80.8 FAMILY HISTORY OF MALIGNANT NEOPLASM OF OTHER ORGANS OR SYSTEMS: ICD-10-CM

## 2024-05-28 DIAGNOSIS — D18.0 HEMANGIOMA: ICD-10-CM

## 2024-05-28 DIAGNOSIS — L57.8 OTHER SKIN CHANGES DUE TO CHRONIC EXPOSURE TO NONIONIZING RADIATION: ICD-10-CM

## 2024-05-28 DIAGNOSIS — Z85.828 PERSONAL HISTORY OF OTHER MALIGNANT NEOPLASM OF SKIN: ICD-10-CM

## 2024-05-28 DIAGNOSIS — L82.1 OTHER SEBORRHEIC KERATOSIS: ICD-10-CM

## 2024-05-28 DIAGNOSIS — L57.0 ACTINIC KERATOSIS: ICD-10-CM

## 2024-05-28 DIAGNOSIS — Z71.89 OTHER SPECIFIED COUNSELING: ICD-10-CM

## 2024-05-28 DIAGNOSIS — D22 MELANOCYTIC NEVI: ICD-10-CM

## 2024-05-28 PROBLEM — D22.5 MELANOCYTIC NEVI OF TRUNK: Status: ACTIVE | Noted: 2024-05-28

## 2024-05-28 PROBLEM — D18.01 HEMANGIOMA OF SKIN AND SUBCUTANEOUS TISSUE: Status: ACTIVE | Noted: 2024-05-28

## 2024-05-28 PROCEDURE — ? TREATMENT REGIMEN

## 2024-05-28 PROCEDURE — ? COUNSELING

## 2024-05-28 PROCEDURE — 99213 OFFICE O/P EST LOW 20 MIN: CPT | Mod: 25

## 2024-05-28 PROCEDURE — 17000 DESTRUCT PREMALG LESION: CPT

## 2024-05-28 PROCEDURE — 17003 DESTRUCT PREMALG LES 2-14: CPT

## 2024-05-28 PROCEDURE — ? LIQUID NITROGEN

## 2024-05-28 ASSESSMENT — LOCATION DETAILED DESCRIPTION DERM
LOCATION DETAILED: RIGHT DISTAL DORSAL FOREARM
LOCATION DETAILED: LEFT CENTRAL TEMPLE
LOCATION DETAILED: RIGHT SUPERIOR OCCIPITAL SCALP
LOCATION DETAILED: RIGHT SUPERIOR UPPER BACK
LOCATION DETAILED: LEFT MID-UPPER BACK
LOCATION DETAILED: RIGHT MID-UPPER BACK
LOCATION DETAILED: LEFT RADIAL DORSAL HAND
LOCATION DETAILED: RIGHT INFERIOR CENTRAL MALAR CHEEK
LOCATION DETAILED: LEFT SUPERIOR UPPER BACK
LOCATION DETAILED: LEFT SUPERIOR HELIX
LOCATION DETAILED: LEFT VENTRAL LATERAL DISTAL FOREARM
LOCATION DETAILED: RIGHT INFERIOR UPPER BACK
LOCATION DETAILED: RIGHT DORSAL RING METACARPOPHALANGEAL JOINT
LOCATION DETAILED: LEFT SUPERIOR OCCIPITAL SCALP

## 2024-05-28 ASSESSMENT — LOCATION ZONE DERM
LOCATION ZONE: TRUNK
LOCATION ZONE: EAR
LOCATION ZONE: FACE
LOCATION ZONE: ARM
LOCATION ZONE: SCALP
LOCATION ZONE: HAND

## 2024-05-28 ASSESSMENT — LOCATION SIMPLE DESCRIPTION DERM
LOCATION SIMPLE: LEFT FOREARM
LOCATION SIMPLE: LEFT TEMPLE
LOCATION SIMPLE: RIGHT OCCIPITAL SCALP
LOCATION SIMPLE: LEFT EAR
LOCATION SIMPLE: RIGHT CHEEK
LOCATION SIMPLE: RIGHT HAND
LOCATION SIMPLE: RIGHT UPPER BACK
LOCATION SIMPLE: LEFT HAND
LOCATION SIMPLE: LEFT OCCIPITAL SCALP
LOCATION SIMPLE: RIGHT FOREARM
LOCATION SIMPLE: LEFT UPPER BACK

## 2024-05-28 NOTE — PROCEDURE: TREATMENT REGIMEN
Continue Regimen: Sun protective clothing daily.
Detail Level: Zone
Initiate Treatment: SPF 30+ daily

## 2024-11-12 ENCOUNTER — APPOINTMENT (RX ONLY)
Dept: URBAN - NONMETROPOLITAN AREA CLINIC 13 | Facility: CLINIC | Age: 79
Setting detail: DERMATOLOGY
End: 2024-11-12

## 2024-11-12 DIAGNOSIS — L82.1 OTHER SEBORRHEIC KERATOSIS: ICD-10-CM

## 2024-11-12 DIAGNOSIS — D18.0 HEMANGIOMA: ICD-10-CM

## 2024-11-12 DIAGNOSIS — Z85.828 PERSONAL HISTORY OF OTHER MALIGNANT NEOPLASM OF SKIN: ICD-10-CM

## 2024-11-12 DIAGNOSIS — L82.0 INFLAMED SEBORRHEIC KERATOSIS: ICD-10-CM

## 2024-11-12 DIAGNOSIS — L57.0 ACTINIC KERATOSIS: ICD-10-CM

## 2024-11-12 DIAGNOSIS — L57.8 OTHER SKIN CHANGES DUE TO CHRONIC EXPOSURE TO NONIONIZING RADIATION: ICD-10-CM

## 2024-11-12 DIAGNOSIS — Z80.8 FAMILY HISTORY OF MALIGNANT NEOPLASM OF OTHER ORGANS OR SYSTEMS: ICD-10-CM

## 2024-11-12 DIAGNOSIS — D22 MELANOCYTIC NEVI: ICD-10-CM

## 2024-11-12 PROBLEM — D18.01 HEMANGIOMA OF SKIN AND SUBCUTANEOUS TISSUE: Status: ACTIVE | Noted: 2024-11-12

## 2024-11-12 PROBLEM — D22.5 MELANOCYTIC NEVI OF TRUNK: Status: ACTIVE | Noted: 2024-11-12

## 2024-11-12 PROCEDURE — ? ADDITIONAL NOTES

## 2024-11-12 PROCEDURE — 17004 DESTROY PREMAL LESIONS 15/>: CPT

## 2024-11-12 PROCEDURE — ? RECOMMENDATIONS

## 2024-11-12 PROCEDURE — 17110 DESTRUCTION B9 LES UP TO 14: CPT | Mod: 59

## 2024-11-12 PROCEDURE — 99213 OFFICE O/P EST LOW 20 MIN: CPT | Mod: 25

## 2024-11-12 PROCEDURE — ? SUNSCREEN RECOMMENDATIONS

## 2024-11-12 PROCEDURE — ? LIQUID NITROGEN

## 2024-11-12 PROCEDURE — ? COUNSELING

## 2024-11-12 ASSESSMENT — LOCATION ZONE DERM
LOCATION ZONE: ARM
LOCATION ZONE: FACE
LOCATION ZONE: NECK
LOCATION ZONE: SCALP
LOCATION ZONE: EAR
LOCATION ZONE: TRUNK
LOCATION ZONE: HAND
LOCATION ZONE: NOSE

## 2024-11-12 ASSESSMENT — LOCATION SIMPLE DESCRIPTION DERM
LOCATION SIMPLE: LEFT UPPER BACK
LOCATION SIMPLE: RIGHT HAND
LOCATION SIMPLE: LEFT WRIST
LOCATION SIMPLE: SCALP
LOCATION SIMPLE: POSTERIOR NECK
LOCATION SIMPLE: RIGHT OCCIPITAL SCALP
LOCATION SIMPLE: LEFT CHEEK
LOCATION SIMPLE: LEFT SHOULDER
LOCATION SIMPLE: LEFT OCCIPITAL SCALP
LOCATION SIMPLE: NOSE
LOCATION SIMPLE: LEFT TEMPLE
LOCATION SIMPLE: LEFT EAR
LOCATION SIMPLE: RIGHT ZYGOMA
LOCATION SIMPLE: RIGHT FOREARM
LOCATION SIMPLE: RIGHT SHOULDER
LOCATION SIMPLE: RIGHT CHEEK
LOCATION SIMPLE: RIGHT TEMPLE
LOCATION SIMPLE: LEFT HAND

## 2024-11-12 ASSESSMENT — LOCATION DETAILED DESCRIPTION DERM
LOCATION DETAILED: RIGHT LATERAL MALAR CHEEK
LOCATION DETAILED: LEFT RADIAL DORSAL HAND
LOCATION DETAILED: RIGHT POSTERIOR SHOULDER
LOCATION DETAILED: LEFT MID PREAURICULAR CHEEK
LOCATION DETAILED: LEFT SUPERIOR POSTERIOR HELIX
LOCATION DETAILED: RIGHT MEDIAL TRAPEZIAL NECK
LOCATION DETAILED: LEFT SUPERIOR OCCIPITAL SCALP
LOCATION DETAILED: LEFT CENTRAL TEMPLE
LOCATION DETAILED: LEFT POSTERIOR SHOULDER
LOCATION DETAILED: NASAL DORSUM
LOCATION DETAILED: LEFT DORSAL MIDDLE METACARPOPHALANGEAL JOINT
LOCATION DETAILED: LEFT DORSAL WRIST
LOCATION DETAILED: RIGHT CENTRAL ZYGOMA
LOCATION DETAILED: RIGHT RADIAL DORSAL HAND
LOCATION DETAILED: LEFT INFERIOR HELIX
LOCATION DETAILED: RIGHT SUPERIOR OCCIPITAL SCALP
LOCATION DETAILED: RIGHT DISTAL DORSAL FOREARM
LOCATION DETAILED: LEFT ULNAR DORSAL HAND
LOCATION DETAILED: RIGHT CENTRAL TEMPLE
LOCATION DETAILED: RIGHT NASAL DORSUM
LOCATION DETAILED: RIGHT ULNAR DORSAL HAND
LOCATION DETAILED: LEFT SUPERIOR PREAURICULAR CHEEK
LOCATION DETAILED: RIGHT SUPERIOR PARIETAL SCALP
LOCATION DETAILED: LEFT SUPERIOR CENTRAL MALAR CHEEK
LOCATION DETAILED: RIGHT CENTRAL MALAR CHEEK
LOCATION DETAILED: LEFT MID-UPPER BACK

## 2024-11-12 NOTE — PROCEDURE: LIQUID NITROGEN
Show Applicator Variable?: Yes
Detail Level: Detailed
Render Post-Care Instructions In Note?: no
Consent: The patient's consent was obtained including but not limited to risks of crusting, scabbing, blistering, scarring, darker or lighter pigmentary change, recurrence, incomplete removal and infection.
Post-Care Instructions: I reviewed with the patient in detail post-care instructions. Patient is to wear sunprotection, and avoid picking at any of the treated lesions. Pt may apply Vaseline to crusted or scabbing areas.
Duration Of Freeze Thaw-Cycle (Seconds): 0
Spray Paint Text: The liquid nitrogen was applied to the skin utilizing a spray paint frosting technique.
Medical Necessity Information: It is in your best interest to select a reason for this procedure from the list below. All of these items fulfill various CMS LCD requirements except the new and changing color options.
Medical Necessity Clause: This procedure was medically necessary because the lesions that were treated were:

## 2024-11-12 NOTE — PROCEDURE: ADDITIONAL NOTES
Additional Notes: Pt reports: Father  from [facility]
Detail Level: Simple
Render Risk Assessment In Note?: no

## 2025-05-14 ENCOUNTER — APPOINTMENT (OUTPATIENT)
Dept: URBAN - NONMETROPOLITAN AREA CLINIC 13 | Facility: CLINIC | Age: 80
Setting detail: DERMATOLOGY
End: 2025-05-14

## 2025-05-14 DIAGNOSIS — L57.0 ACTINIC KERATOSIS: ICD-10-CM

## 2025-05-14 DIAGNOSIS — D18.0 HEMANGIOMA: ICD-10-CM

## 2025-05-14 DIAGNOSIS — L57.8 OTHER SKIN CHANGES DUE TO CHRONIC EXPOSURE TO NONIONIZING RADIATION: ICD-10-CM

## 2025-05-14 DIAGNOSIS — L82.1 OTHER SEBORRHEIC KERATOSIS: ICD-10-CM

## 2025-05-14 DIAGNOSIS — Z85.828 PERSONAL HISTORY OF OTHER MALIGNANT NEOPLASM OF SKIN: ICD-10-CM

## 2025-05-14 DIAGNOSIS — D22 MELANOCYTIC NEVI: ICD-10-CM

## 2025-05-14 PROBLEM — D22.5 MELANOCYTIC NEVI OF TRUNK: Status: ACTIVE | Noted: 2025-05-14

## 2025-05-14 PROBLEM — D18.01 HEMANGIOMA OF SKIN AND SUBCUTANEOUS TISSUE: Status: ACTIVE | Noted: 2025-05-14

## 2025-05-14 PROCEDURE — ? LIQUID NITROGEN

## 2025-05-14 PROCEDURE — 99213 OFFICE O/P EST LOW 20 MIN: CPT | Mod: 25

## 2025-05-14 PROCEDURE — ? COUNSELING

## 2025-05-14 PROCEDURE — 17004 DESTROY PREMAL LESIONS 15/>: CPT

## 2025-05-14 PROCEDURE — ? SUNSCREEN RECOMMENDATIONS

## 2025-05-14 ASSESSMENT — LOCATION DETAILED DESCRIPTION DERM
LOCATION DETAILED: INFERIOR MID FOREHEAD
LOCATION DETAILED: LEFT MID-UPPER BACK
LOCATION DETAILED: RIGHT INFERIOR UPPER BACK
LOCATION DETAILED: LEFT INFERIOR CENTRAL MALAR CHEEK

## 2025-05-14 ASSESSMENT — LOCATION SIMPLE DESCRIPTION DERM
LOCATION SIMPLE: LEFT UPPER BACK
LOCATION SIMPLE: RIGHT UPPER BACK
LOCATION SIMPLE: INFERIOR FOREHEAD
LOCATION SIMPLE: LEFT CHEEK

## 2025-05-14 ASSESSMENT — LOCATION ZONE DERM
LOCATION ZONE: FACE
LOCATION ZONE: TRUNK

## 2025-08-07 DIAGNOSIS — Z00.8 ENCOUNTER FOR OTHER GENERAL EXAMINATION: Primary | ICD-10-CM

## 2025-08-13 ENCOUNTER — OFFICE VISIT (OUTPATIENT)
Dept: PODIATRY | Facility: CLINIC | Age: 80
End: 2025-08-13
Payer: OTHER GOVERNMENT

## 2025-08-13 VITALS — HEIGHT: 73 IN | BODY MASS INDEX: 26.76 KG/M2 | WEIGHT: 201.94 LBS

## 2025-08-13 DIAGNOSIS — B35.1 ONYCHOMYCOSIS DUE TO DERMATOPHYTE: Primary | ICD-10-CM

## 2025-08-13 DIAGNOSIS — Z00.8 ENCOUNTER FOR OTHER GENERAL EXAMINATION: ICD-10-CM

## 2025-08-13 DIAGNOSIS — E11.42 TYPE 2 DIABETES MELLITUS WITH DIABETIC POLYNEUROPATHY, UNSPECIFIED WHETHER LONG TERM INSULIN USE: ICD-10-CM

## 2025-08-13 PROCEDURE — 99213 OFFICE O/P EST LOW 20 MIN: CPT | Mod: PBBFAC,PN | Performed by: PODIATRIST

## 2025-08-13 PROCEDURE — 99999 PR PBB SHADOW E&M-EST. PATIENT-LVL III: CPT | Mod: PBBFAC,,, | Performed by: PODIATRIST

## 2025-08-13 RX ORDER — CICLOPIROX 80 MG/ML
SOLUTION TOPICAL NIGHTLY
Qty: 6.6 ML | Refills: 11 | Status: SHIPPED | OUTPATIENT
Start: 2025-08-13